# Patient Record
Sex: MALE | Race: BLACK OR AFRICAN AMERICAN | Employment: OTHER | URBAN - METROPOLITAN AREA
[De-identification: names, ages, dates, MRNs, and addresses within clinical notes are randomized per-mention and may not be internally consistent; named-entity substitution may affect disease eponyms.]

---

## 2017-05-25 PROBLEM — N40.0 BENIGN NODULAR PROSTATIC HYPERPLASIA WITHOUT LOWER URINARY TRACT SYMPTOMS: Status: ACTIVE | Noted: 2017-05-25

## 2017-07-12 PROBLEM — E11.42 TYPE 2 DIABETES MELLITUS WITH DIABETIC POLYNEUROPATHY, WITH LONG-TERM CURRENT USE OF INSULIN (HCC): Status: ACTIVE | Noted: 2017-07-12

## 2017-07-12 PROBLEM — Z79.4 TYPE 2 DIABETES MELLITUS WITH DIABETIC POLYNEUROPATHY, WITH LONG-TERM CURRENT USE OF INSULIN (HCC): Status: ACTIVE | Noted: 2017-07-12

## 2017-11-08 ENCOUNTER — HOSPITAL ENCOUNTER (EMERGENCY)
Age: 67
Discharge: HOME | End: 2017-11-08
Attending: EMERGENCY MEDICINE
Payer: MEDICARE

## 2017-11-08 ENCOUNTER — APPOINTMENT (OUTPATIENT)
Dept: GENERAL RADIOLOGY | Age: 67
End: 2017-11-08
Attending: EMERGENCY MEDICINE
Payer: MEDICARE

## 2017-11-08 ENCOUNTER — APPOINTMENT (OUTPATIENT)
Dept: CT IMAGING | Age: 67
End: 2017-11-08
Attending: EMERGENCY MEDICINE
Payer: MEDICARE

## 2017-11-08 VITALS
HEART RATE: 48 BPM | DIASTOLIC BLOOD PRESSURE: 86 MMHG | BODY MASS INDEX: 32.37 KG/M2 | OXYGEN SATURATION: 93 % | RESPIRATION RATE: 18 BRPM | SYSTOLIC BLOOD PRESSURE: 196 MMHG | TEMPERATURE: 99 F | WEIGHT: 239 LBS | HEIGHT: 72 IN

## 2017-11-08 DIAGNOSIS — R53.1 WEAKNESS: Primary | ICD-10-CM

## 2017-11-08 LAB
ALBUMIN SERPL-MCNC: 2.9 G/DL (ref 3.2–4.6)
ALBUMIN/GLOB SERPL: 0.8 {RATIO} (ref 1.2–3.5)
ALP SERPL-CCNC: 68 U/L (ref 50–136)
ALT SERPL-CCNC: 22 U/L (ref 12–65)
ANION GAP SERPL CALC-SCNC: 9 MMOL/L (ref 7–16)
APTT PPP: 24.5 SEC (ref 23.5–31.7)
AST SERPL-CCNC: 30 U/L (ref 15–37)
BASOPHILS # BLD: 0 K/UL (ref 0–0.2)
BASOPHILS NFR BLD: 0 % (ref 0–2)
BILIRUB SERPL-MCNC: 0.2 MG/DL (ref 0.2–1.1)
BUN SERPL-MCNC: 15 MG/DL (ref 8–23)
CALCIUM SERPL-MCNC: 8.7 MG/DL (ref 8.3–10.4)
CHLORIDE SERPL-SCNC: 105 MMOL/L (ref 98–107)
CO2 SERPL-SCNC: 27 MMOL/L (ref 21–32)
CREAT SERPL-MCNC: 1.29 MG/DL (ref 0.8–1.5)
DIFFERENTIAL METHOD BLD: ABNORMAL
EOSINOPHIL # BLD: 0.1 K/UL (ref 0–0.8)
EOSINOPHIL NFR BLD: 1 % (ref 0.5–7.8)
ERYTHROCYTE [DISTWIDTH] IN BLOOD BY AUTOMATED COUNT: 15.6 % (ref 11.9–14.6)
GLOBULIN SER CALC-MCNC: 3.5 G/DL (ref 2.3–3.5)
GLUCOSE SERPL-MCNC: 87 MG/DL (ref 65–100)
HCT VFR BLD AUTO: 35.2 % (ref 41.1–50.3)
HGB BLD-MCNC: 12.3 G/DL (ref 13.6–17.2)
IMM GRANULOCYTES # BLD: 0 K/UL (ref 0–0.5)
IMM GRANULOCYTES NFR BLD: 0 % (ref 0–5)
INR PPP: 1 (ref 0.9–1.2)
LYMPHOCYTES # BLD: 1.7 K/UL (ref 0.5–4.6)
LYMPHOCYTES NFR BLD: 27 % (ref 13–44)
MCH RBC QN AUTO: 31.2 PG (ref 26.1–32.9)
MCHC RBC AUTO-ENTMCNC: 34.9 G/DL (ref 31.4–35)
MCV RBC AUTO: 89.3 FL (ref 79.6–97.8)
MONOCYTES # BLD: 0.3 K/UL (ref 0.1–1.3)
MONOCYTES NFR BLD: 5 % (ref 4–12)
NEUTS SEG # BLD: 4.4 K/UL (ref 1.7–8.2)
NEUTS SEG NFR BLD: 67 % (ref 43–78)
PLATELET # BLD AUTO: 168 K/UL (ref 150–450)
PMV BLD AUTO: 11.2 FL (ref 10.8–14.1)
POTASSIUM SERPL-SCNC: 3.9 MMOL/L (ref 3.5–5.1)
PROT SERPL-MCNC: 6.4 G/DL (ref 6.3–8.2)
PROTHROMBIN TIME: 11.2 SEC (ref 9.6–12)
RBC # BLD AUTO: 3.94 M/UL (ref 4.23–5.67)
SODIUM SERPL-SCNC: 141 MMOL/L (ref 136–145)
TROPONIN I SERPL-MCNC: <0.02 NG/ML (ref 0.02–0.05)
WBC # BLD AUTO: 6.6 K/UL (ref 4.3–11.1)

## 2017-11-08 PROCEDURE — 71020 XR CHEST PA LAT: CPT

## 2017-11-08 PROCEDURE — 70450 CT HEAD/BRAIN W/O DYE: CPT

## 2017-11-08 PROCEDURE — 93005 ELECTROCARDIOGRAM TRACING: CPT | Performed by: EMERGENCY MEDICINE

## 2017-11-08 PROCEDURE — 85730 THROMBOPLASTIN TIME PARTIAL: CPT | Performed by: EMERGENCY MEDICINE

## 2017-11-08 PROCEDURE — 99285 EMERGENCY DEPT VISIT HI MDM: CPT | Performed by: EMERGENCY MEDICINE

## 2017-11-08 PROCEDURE — 80053 COMPREHEN METABOLIC PANEL: CPT | Performed by: EMERGENCY MEDICINE

## 2017-11-08 PROCEDURE — 84484 ASSAY OF TROPONIN QUANT: CPT | Performed by: EMERGENCY MEDICINE

## 2017-11-08 PROCEDURE — 85025 COMPLETE CBC W/AUTO DIFF WBC: CPT | Performed by: EMERGENCY MEDICINE

## 2017-11-08 PROCEDURE — 85610 PROTHROMBIN TIME: CPT | Performed by: EMERGENCY MEDICINE

## 2017-11-09 LAB
ATRIAL RATE: 46 BPM
CALCULATED P AXIS, ECG09: 59 DEGREES
CALCULATED R AXIS, ECG10: 40 DEGREES
CALCULATED T AXIS, ECG11: 80 DEGREES
DIAGNOSIS, 93000: NORMAL
P-R INTERVAL, ECG05: 176 MS
Q-T INTERVAL, ECG07: 500 MS
QRS DURATION, ECG06: 90 MS
QTC CALCULATION (BEZET), ECG08: 437 MS
VENTRICULAR RATE, ECG03: 46 BPM

## 2017-11-09 NOTE — ED TRIAGE NOTES
EMS states \"Patient had a headache on Sunday and Monday that is gone and states he has had weakness since Monday that went away about 2 hours ago\"

## 2017-11-09 NOTE — ED NOTES
I have reviewed discharge instructions with the patient. The patient verbalized understanding. Patient left ED via Discharge Method: wheelchair to Home with his wife and daughter    Opportunity for questions and clarification provided. Patient given 0 scripts.

## 2017-11-09 NOTE — ED TRIAGE NOTES
\"Its kind of like she said. .. I think its better. I was leaning to the left, I was stumbling, my legs were weak.   I feel like Im not stumbling anymore\"
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
## 2017-11-09 NOTE — DISCHARGE INSTRUCTIONS
Weakness: Care Instructions  Your Care Instructions    Weakness is a lack of physical or muscle strength. You may feel that you need to make extra effort to move your arms, legs, or other muscles. Generalized weakness means that you feel weak in most areas of your body. Another type of weakness may affect just one muscle or group of muscles. You may feel weak and tired after you have done too much activity, such as taking an extra-long hike. This is not a serious problem. It often goes away on its own. Feeling weak can also be caused by medical conditions like thyroid problems, depression, or a virus. Sometimes the cause can be serious. Your doctor may want to do more tests to try to find the cause of the weakness. The doctor has checked you carefully, but problems can develop later. If you notice any problems or new symptoms, get medical treatment right away. Follow-up care is a key part of your treatment and safety. Be sure to make and go to all appointments, and call your doctor if you are having problems. It's also a good idea to know your test results and keep a list of the medicines you take. How can you care for yourself at home? · Rest when you feel tired. · Be safe with medicines. If your doctor prescribed medicine, take it exactly as prescribed. Call your doctor if you think you are having a problem with your medicine. You will get more details on the specific medicines your doctor prescribes. · Do not skip meals. Eating a balanced diet may increase your energy level. · Get some physical activity every day, but do not get too tired. When should you call for help? Call your doctor now or seek immediate medical care if:  ? · You have new or worse weakness. ? · You are dizzy or lightheaded, or you feel like you may faint. ? Watch closely for changes in your health, and be sure to contact your doctor if:  ? · You do not get better as expected. Where can you learn more?   Go to http://norris.info/. Enter [id number] in the search box to learn more about \"Weakness: Care Instructions. \"  Current as of: March 20, 2017  Content Version: 11.4  © 2100-9182 Healthwise, Incorporated. Care instructions adapted under license by [employer] (which disclaims liability or warranty for this information). If you have questions about a medical condition or this instruction, always ask your healthcare professional. [address] any warranty or liability for your use of this information.

## 2017-11-12 PROBLEM — Z79.4 TYPE 2 DIABETES MELLITUS WITH DIABETIC POLYNEUROPATHY, WITH LONG-TERM CURRENT USE OF INSULIN (HCC): Status: RESOLVED | Noted: 2017-07-12 | Resolved: 2017-11-12

## 2017-11-12 PROBLEM — E11.42 TYPE 2 DIABETES MELLITUS WITH DIABETIC POLYNEUROPATHY, WITH LONG-TERM CURRENT USE OF INSULIN (HCC): Status: RESOLVED | Noted: 2017-07-12 | Resolved: 2017-11-12

## 2017-11-27 ENCOUNTER — APPOINTMENT (OUTPATIENT)
Dept: GENERAL RADIOLOGY | Age: 67
End: 2017-11-27
Attending: INTERNAL MEDICINE
Payer: MEDICARE

## 2017-11-27 ENCOUNTER — HOSPITAL ENCOUNTER (OUTPATIENT)
Age: 67
Setting detail: OBSERVATION
Discharge: HOME HEALTH | End: 2017-11-29
Attending: EMERGENCY MEDICINE | Admitting: INTERNAL MEDICINE
Payer: MEDICARE

## 2017-11-27 ENCOUNTER — APPOINTMENT (OUTPATIENT)
Dept: GENERAL RADIOLOGY | Age: 67
End: 2017-11-27
Attending: EMERGENCY MEDICINE
Payer: MEDICARE

## 2017-11-27 DIAGNOSIS — L03.116 CELLULITIS OF LEFT LOWER LEG: Primary | ICD-10-CM

## 2017-11-27 DIAGNOSIS — L97.522 DIABETIC ULCER OF LEFT FOOT ASSOCIATED WITH TYPE 1 DIABETES MELLITUS, WITH FAT LAYER EXPOSED, UNSPECIFIED PART OF FOOT (HCC): ICD-10-CM

## 2017-11-27 DIAGNOSIS — E10.621 DIABETIC ULCER OF LEFT FOOT ASSOCIATED WITH TYPE 1 DIABETES MELLITUS, WITH FAT LAYER EXPOSED, UNSPECIFIED PART OF FOOT (HCC): ICD-10-CM

## 2017-11-27 PROBLEM — S81.802A LEG WOUND, LEFT: Status: ACTIVE | Noted: 2017-11-27

## 2017-11-27 LAB
ALBUMIN SERPL-MCNC: 2.9 G/DL (ref 3.2–4.6)
ALBUMIN/GLOB SERPL: 0.6 {RATIO} (ref 1.2–3.5)
ALP SERPL-CCNC: 90 U/L (ref 50–136)
ALT SERPL-CCNC: 18 U/L (ref 12–65)
ANION GAP SERPL CALC-SCNC: 8 MMOL/L (ref 7–16)
AST SERPL-CCNC: 22 U/L (ref 15–37)
BASOPHILS # BLD: 0 K/UL (ref 0–0.2)
BASOPHILS NFR BLD: 0 % (ref 0–2)
BILIRUB SERPL-MCNC: 0.4 MG/DL (ref 0.2–1.1)
BUN SERPL-MCNC: 14 MG/DL (ref 8–23)
CALCIUM SERPL-MCNC: 8.8 MG/DL (ref 8.3–10.4)
CHLORIDE SERPL-SCNC: 104 MMOL/L (ref 98–107)
CO2 SERPL-SCNC: 29 MMOL/L (ref 21–32)
CREAT SERPL-MCNC: 1.08 MG/DL (ref 0.8–1.5)
CRP SERPL-MCNC: 1.3 MG/DL (ref 0–0.9)
DIFFERENTIAL METHOD BLD: ABNORMAL
EOSINOPHIL # BLD: 0.1 K/UL (ref 0–0.8)
EOSINOPHIL NFR BLD: 1 % (ref 0.5–7.8)
ERYTHROCYTE [DISTWIDTH] IN BLOOD BY AUTOMATED COUNT: 15.9 % (ref 11.9–14.6)
GLOBULIN SER CALC-MCNC: 4.7 G/DL (ref 2.3–3.5)
GLUCOSE SERPL-MCNC: 113 MG/DL (ref 65–100)
HCT VFR BLD AUTO: 37.7 % (ref 41.1–50.3)
HGB BLD-MCNC: 12.7 G/DL (ref 13.6–17.2)
IMM GRANULOCYTES # BLD: 0 K/UL (ref 0–0.5)
IMM GRANULOCYTES NFR BLD AUTO: 0 % (ref 0–5)
LACTATE BLD-SCNC: 1.1 MMOL/L (ref 0.5–1.9)
LYMPHOCYTES # BLD: 2 K/UL (ref 0.5–4.6)
LYMPHOCYTES NFR BLD: 29 % (ref 13–44)
MCH RBC QN AUTO: 30.6 PG (ref 26.1–32.9)
MCHC RBC AUTO-ENTMCNC: 33.7 G/DL (ref 31.4–35)
MCV RBC AUTO: 90.8 FL (ref 79.6–97.8)
MONOCYTES # BLD: 0.6 K/UL (ref 0.1–1.3)
MONOCYTES NFR BLD: 9 % (ref 4–12)
NEUTS SEG # BLD: 4.2 K/UL (ref 1.7–8.2)
NEUTS SEG NFR BLD: 61 % (ref 43–78)
PLATELET # BLD AUTO: 206 K/UL (ref 150–450)
PMV BLD AUTO: 11.6 FL (ref 10.8–14.1)
POTASSIUM SERPL-SCNC: 3.7 MMOL/L (ref 3.5–5.1)
PROCALCITONIN SERPL-MCNC: <0.1 NG/ML
PROT SERPL-MCNC: 7.6 G/DL (ref 6.3–8.2)
RBC # BLD AUTO: 4.15 M/UL (ref 4.23–5.67)
SODIUM SERPL-SCNC: 141 MMOL/L (ref 136–145)
WBC # BLD AUTO: 6.9 K/UL (ref 4.3–11.1)

## 2017-11-27 PROCEDURE — 99283 EMERGENCY DEPT VISIT LOW MDM: CPT | Performed by: EMERGENCY MEDICINE

## 2017-11-27 PROCEDURE — 96375 TX/PRO/DX INJ NEW DRUG ADDON: CPT | Performed by: EMERGENCY MEDICINE

## 2017-11-27 PROCEDURE — 96365 THER/PROPH/DIAG IV INF INIT: CPT | Performed by: EMERGENCY MEDICINE

## 2017-11-27 PROCEDURE — 86140 C-REACTIVE PROTEIN: CPT | Performed by: EMERGENCY MEDICINE

## 2017-11-27 PROCEDURE — 87070 CULTURE OTHR SPECIMN AEROBIC: CPT | Performed by: EMERGENCY MEDICINE

## 2017-11-27 PROCEDURE — 85025 COMPLETE CBC W/AUTO DIFF WBC: CPT | Performed by: EMERGENCY MEDICINE

## 2017-11-27 PROCEDURE — 73590 X-RAY EXAM OF LOWER LEG: CPT

## 2017-11-27 PROCEDURE — 96367 TX/PROPH/DG ADDL SEQ IV INF: CPT | Performed by: EMERGENCY MEDICINE

## 2017-11-27 PROCEDURE — 74011250636 HC RX REV CODE- 250/636: Performed by: EMERGENCY MEDICINE

## 2017-11-27 PROCEDURE — 74011000258 HC RX REV CODE- 258: Performed by: EMERGENCY MEDICINE

## 2017-11-27 PROCEDURE — 80053 COMPREHEN METABOLIC PANEL: CPT | Performed by: EMERGENCY MEDICINE

## 2017-11-27 PROCEDURE — 87077 CULTURE AEROBIC IDENTIFY: CPT | Performed by: EMERGENCY MEDICINE

## 2017-11-27 PROCEDURE — 84145 PROCALCITONIN (PCT): CPT | Performed by: EMERGENCY MEDICINE

## 2017-11-27 PROCEDURE — 73610 X-RAY EXAM OF ANKLE: CPT

## 2017-11-27 PROCEDURE — 87040 BLOOD CULTURE FOR BACTERIA: CPT | Performed by: INTERNAL MEDICINE

## 2017-11-27 PROCEDURE — 83605 ASSAY OF LACTIC ACID: CPT

## 2017-11-27 PROCEDURE — 87186 SC STD MICRODIL/AGAR DIL: CPT | Performed by: EMERGENCY MEDICINE

## 2017-11-27 PROCEDURE — 93005 ELECTROCARDIOGRAM TRACING: CPT | Performed by: INTERNAL MEDICINE

## 2017-11-27 RX ORDER — VANCOMYCIN 2 GRAM/500 ML IN 0.9 % SODIUM CHLORIDE INTRAVENOUS
2000 ONCE
Status: COMPLETED | OUTPATIENT
Start: 2017-11-27 | End: 2017-11-28

## 2017-11-27 RX ORDER — VANCOMYCIN HYDROCHLORIDE 1 G/20ML
INJECTION, POWDER, LYOPHILIZED, FOR SOLUTION INTRAVENOUS ONCE
Status: DISCONTINUED | OUTPATIENT
Start: 2017-11-27 | End: 2017-11-27 | Stop reason: DRUGHIGH

## 2017-11-27 RX ADMIN — VANCOMYCIN HYDROCHLORIDE 2000 MG: 10 INJECTION, POWDER, LYOPHILIZED, FOR SOLUTION INTRAVENOUS at 23:27

## 2017-11-27 RX ADMIN — PIPERACILLIN SODIUM,TAZOBACTAM SODIUM 4.5 G: 4; .5 INJECTION, POWDER, FOR SOLUTION INTRAVENOUS at 21:50

## 2017-11-27 NOTE — IP AVS SNAPSHOT
Kamini Galvan 
 
 
 [address] [facility] [address] [facility] 
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  
Where:  [facility] with [facility] at  1:45 PM  
Where:  [facility] ([address] [place] [address]) Go to Ellis Granda MD  
at 1 :45 wound care clinic Phone:  [phone] Where:  [address], [place] [address] [address] Tuesday Jan 16, 2018 Follow Up with Colton Raygoza MD at  1:15 PM  
Where:  [facility] ([address] [facility]) Discharge Orders None A check skylar indicates which time of day the medication should be taken. My Medications TAKE these medications as instructed Instructions Each Dose to Equal  
 Morning Noon Evening Bedtime  
 aspirin delayed-release 81 mg tablet Your next dose is:  11-30 Take 1 Tab by mouth daily. 81 mg  
    
   
   
   
  
 atorvastatin 80 mg tablet Commonly known as:  LIPITOR Your next dose is:  11-30 Take 1 Tab by mouth daily. 80 mg  
    
   
   
   
  
 cloNIDine HCl 0.2 mg tablet Commonly known as:  CATAPRES Your next dose is: This evening Take 0.2 mg by mouth two (2) times a day. 0.2 mg  
    
   
   
  
   
  
 clopidogrel 75 mg Tab Commonly known as:  PLAVIX Your next dose is:  11-30 Take 1 Tab by mouth daily (after breakfast). 75 mg  
    
   
   
   
  
 furosemide 20 mg tablet Commonly known as:  LASIX Your next dose is:  11-30 Take 1 Tab by mouth daily. Take  by mouth daily. 20 mg  
    
   
   
   
  
 gabapentin 600 mg tablet Commonly known as:  NEURONTIN Your next dose is: This evening Take 1 Tab by mouth three (3) times daily. 600 mg  
    
   
   
  
   
  
 hydrALAZINE 50 mg tablet Commonly known as:  APRESOLINE Your next dose is: This evening Take 0.5 Tabs by mouth three (3) times daily. 25 mg  
    
   
   
  
   
  
 isosorbide mononitrate ER 30 mg tablet Commonly known as:  IMDUR Your next dose is:  11-30 Take  by mouth daily. levothyroxine 25 mcg tablet Commonly known as:  SYNTHROID Your next dose is:  11-30 Take  by mouth Daily (before breakfast). metoprolol tartrate 50 mg tablet Commonly known as:  LOPRESSOR Your next dose is: This evening Take 25 mg by mouth two (2) times a day. 25 mg  
    
   
   
  
   
  
 morphine IR 15 mg tablet Commonly known as:  MS IR Your next dose is:  As needed Take  by mouth every four (4) hours as needed. NITROSTAT 0.4 mg SL tablet Generic drug:  nitroglycerin Your next dose is:  As needed  
   
 by SubLINGual route every five (5) minutes as needed. NORVASC 10 mg tablet Generic drug:  amLODIPine Your next dose is:  11-30 Take  by mouth daily. NovoLIN N 100 unit/mL injection Generic drug:  insulin NPH Your next dose is:  bedtime 40 Units by SubCUTAneous route once. 40 UNITS QAM, 32 UNITS QHS  Indications: type 2 diabetes mellitus 40 Units  
    
   
   
   
  
  
 oxybutynin 5 mg tablet Commonly known as:  KEODWVIP Your next dose is:  bedtime Take 5 mg by mouth nightly. 5 mg  
    
   
   
   
  
  
 pioglitazone 30 mg tablet Commonly known as:  ACTOS Your next dose is:  bedtime Take 1 Tab by mouth nightly. 30 mg  
    
   
   
   
  
  
 potassium chloride 20 mEq tablet Commonly known as:  K-DUR, KLOR-CON Your next dose is:  11-30 Take 1 Tab by mouth daily. 20 mEq  
    
   
   
   
  
 prazosin 1 mg capsule Commonly known as:  MINIPRESS Your next dose is:  bedtime Take 1 Cap by mouth nightly. 1 mg RAZADYNE 8 mg tablet Generic drug:  galantamine Your next dose is: This evening Take 8 mg by mouth two (2) times a day. 8 mg  
    
   
   
  
   
  
 risperiDONE 2 mg tablet Commonly known as:  RisperDAL Your next dose is:  11-30 Take 1 mg by mouth daily. 1 mg  
    
   
   
   
  
 valsartan 160 mg tablet Commonly known as:  DIOVAN Your next dose is:  11-30 Take 1 Tab by mouth daily. 160 mg  
    
   
   
   
  
 VITAMIN B-12 1,000 mcg tablet Generic drug:  cyanocobalamin Your next dose is:  11-30 Take 1,000 mcg by mouth daily. 1000 mcg Discharge Instructions DISCHARGE SUMMARY from Nurse PATIENT INSTRUCTIONS: 
 
 
F-face looks uneven A-arms unable to move or move unevenly S-speech slurred or non-existent T-time-call 911 as soon as signs and symptoms begin-DO NOT go Back to bed or wait to see if you get better-TIME IS BRAIN. Warning Signs of HEART ATTACK Call 911 if you have these symptoms: 
? Chest discomfort. Most heart attacks involve discomfort in the center of the chest that lasts more than a few minutes, or that goes away and comes back. It can feel like uncomfortable pressure, squeezing, fullness, or pain. ? Discomfort in other areas of the upper body. Symptoms can include pain or discomfort in one or both arms, the back, neck, jaw, or stomach. ? Shortness of breath with or without chest discomfort. ? Other signs may include breaking out in a cold sweat, nausea, or lightheadedness. Don't wait more than five minutes to call [address]! Fast action can save your life. Calling 911 is almost always the fastest way to get lifesaving treatment. Emergency Medical Services staff can begin treatment when they arrive  up to an hour sooner than if someone gets to the hospital by car. The discharge information has been reviewed with the patient. The patient verbalized understanding. Discharge medications reviewed with the patient and appropriate educational materials and side effects teaching were provided. ___________________________________________________________________________________________________________________________________ [facility] Announcement We are excited to announce that we are making your provider's discharge notes available to you in Relatientt. You will see these notes when they are completed and signed by the physician that discharged you from your recent hospital stay.   If you have any questions or concerns about any information you see in Relatientt, please call the Health Information Department where you were seen or reach out to your Primary Care Provider for more information about your plan of care. Introducing [facility]! Tamy Butts introduces WhistleTalk patient portal. Now you can access parts of your medical record, email your doctor's office, and request medication refills online. 1. In your internet browser, go to [email or web address] 2. Click on the First Time User? Click Here link in the Sign In box. You will see the New Member Sign Up page. 3. Enter your WhistleTalk Access Code exactly as it appears below. You will not need to use this code after youve completed the sign-up process. If you do not sign up before the expiration date, you must request a new code. · WhistleTalk Access Code: [email or web address] Expires: 12/25/2017  7:17 AM 
 
4. Enter the last four digits of your Social Security Number (xxxx) and Date of Birth (mm/dd/yyyy) as indicated and click Submit. You will be taken to the next sign-up page. 5. Create a WhistleTalk ID. This will be your WhistleTalk login ID and cannot be changed, so think of one that is secure and easy to remember. 6. Create a WhistleTalk password. You can change your password at any time. 7. Enter your Password Reset Question and Answer. This can be used at a later time if you forget your password. 8. Enter your e-mail address. You will receive e-mail notification when new information is available in [address] 9. Click Sign Up. You can now view and download portions of your medical record. 10. Click the Download Summary menu link to download a portable copy of your medical information. If you have questions, please visit the Frequently Asked Questions section of the WhistleTalk website. Remember, WhistleTalk is NOT to be used for urgent needs. For medical emergencies, dial 911. Now available from your iPhone and Android! Unresulted Labs-Please follow up with your PCP about these lab tests Order Current Status CULTURE, BLOOD Preliminary result CULTURE, BLOOD Preliminary result CULTURE, WOUND W GRAM STAIN Preliminary result Providers Seen During Your Hospitalization Provider Specialty Primary office phone Kelechi Erazo MD Emergency Medicine [phone] Claudette Promise, MD Internal Medicine [phone] Immunizations Administered for This Admission Name Date  
 TB Skin Test (PPD) Intradermal 11/28/2017 Your Primary Care Physician (PCP) Primary Care Physician Office Phone Office Fax [address] [phone] You are allergic to the following Allergen Reactions Fosinopril Hives Lisinopril Unknown (comments) Recent Documentation Height Weight BMI Smoking Status 1.854 m 117.3 kg 34.12 kg/m2 Current Some Day Smoker Emergency Contacts Name Discharge Info Relation Home Work Mobile Maryse Al  Spouse [3] [phone] Patient Belongings The following personal items are in your possession at time of discharge: 
  Dental Appliances: Uppers, Lowers, At bedside, With patient  Visual Aid: Glasses      Home Medications: None   Jewelry: Ring, With patient  Clothing: At bedside, Pants, Shirt, Slippers, Sweater, With patient    Other Valuables: Cane, With patient, At bedside Please provide this summary of care documentation to your next provider. Signatures-by signing, you are acknowledging that this After Visit Summary has been reviewed with you and you have received a copy. Patient Signature:  ____________________________________________________________ Date:  ____________________________________________________________  
  
[employer] Provider Signature:  ____________________________________________________________ Date:  ____________________________________________________________

## 2017-11-28 ENCOUNTER — APPOINTMENT (OUTPATIENT)
Dept: ULTRASOUND IMAGING | Age: 67
End: 2017-11-28
Attending: INTERNAL MEDICINE
Payer: MEDICARE

## 2017-11-28 ENCOUNTER — APPOINTMENT (OUTPATIENT)
Dept: GENERAL RADIOLOGY | Age: 67
End: 2017-11-28
Attending: INTERNAL MEDICINE
Payer: MEDICARE

## 2017-11-28 PROBLEM — N40.0 BENIGN NODULAR PROSTATIC HYPERPLASIA WITHOUT LOWER URINARY TRACT SYMPTOMS: Chronic | Status: ACTIVE | Noted: 2017-05-25

## 2017-11-28 LAB
ALBUMIN SERPL-MCNC: 2.7 G/DL (ref 3.2–4.6)
ALBUMIN/GLOB SERPL: 0.6 {RATIO} (ref 1.2–3.5)
ALP SERPL-CCNC: 86 U/L (ref 50–136)
ALT SERPL-CCNC: 16 U/L (ref 12–65)
ANION GAP SERPL CALC-SCNC: 10 MMOL/L (ref 7–16)
AST SERPL-CCNC: 17 U/L (ref 15–37)
ATRIAL RATE: 82 BPM
BILIRUB SERPL-MCNC: 0.6 MG/DL (ref 0.2–1.1)
BUN SERPL-MCNC: 11 MG/DL (ref 8–23)
CALCIUM SERPL-MCNC: 8.6 MG/DL (ref 8.3–10.4)
CALCULATED P AXIS, ECG09: 73 DEGREES
CALCULATED R AXIS, ECG10: 16 DEGREES
CALCULATED T AXIS, ECG11: 69 DEGREES
CHLORIDE SERPL-SCNC: 105 MMOL/L (ref 98–107)
CHOLEST SERPL-MCNC: 114 MG/DL
CO2 SERPL-SCNC: 26 MMOL/L (ref 21–32)
CREAT SERPL-MCNC: 0.86 MG/DL (ref 0.8–1.5)
DIAGNOSIS, 93000: NORMAL
ERYTHROCYTE [DISTWIDTH] IN BLOOD BY AUTOMATED COUNT: 15.9 % (ref 11.9–14.6)
EST. AVERAGE GLUCOSE BLD GHB EST-MCNC: 148 MG/DL
GLOBULIN SER CALC-MCNC: 4.3 G/DL (ref 2.3–3.5)
GLUCOSE BLD STRIP.AUTO-MCNC: 112 MG/DL (ref 65–100)
GLUCOSE BLD STRIP.AUTO-MCNC: 151 MG/DL (ref 65–100)
GLUCOSE BLD STRIP.AUTO-MCNC: 174 MG/DL (ref 65–100)
GLUCOSE SERPL-MCNC: 155 MG/DL (ref 65–100)
HBA1C MFR BLD: 6.8 % (ref 4.8–6)
HCT VFR BLD AUTO: 36 % (ref 41.1–50.3)
HDLC SERPL-MCNC: 52 MG/DL (ref 40–60)
HDLC SERPL: 2.2 {RATIO}
HGB BLD-MCNC: 12.2 G/DL (ref 13.6–17.2)
LACTATE SERPL-SCNC: 1.3 MMOL/L (ref 0.4–2)
LDLC SERPL CALC-MCNC: 50 MG/DL
LIPID PROFILE,FLP: NORMAL
MAGNESIUM SERPL-MCNC: 1.7 MG/DL (ref 1.8–2.4)
MCH RBC QN AUTO: 30.7 PG (ref 26.1–32.9)
MCHC RBC AUTO-ENTMCNC: 33.9 G/DL (ref 31.4–35)
MCV RBC AUTO: 90.5 FL (ref 79.6–97.8)
P-R INTERVAL, ECG05: 148 MS
PLATELET # BLD AUTO: 206 K/UL (ref 150–450)
PMV BLD AUTO: 12.1 FL (ref 10.8–14.1)
POTASSIUM SERPL-SCNC: 3.5 MMOL/L (ref 3.5–5.1)
PROT SERPL-MCNC: 7 G/DL (ref 6.3–8.2)
Q-T INTERVAL, ECG07: 392 MS
QRS DURATION, ECG06: 88 MS
QTC CALCULATION (BEZET), ECG08: 457 MS
RBC # BLD AUTO: 3.98 M/UL (ref 4.23–5.67)
SODIUM SERPL-SCNC: 141 MMOL/L (ref 136–145)
TRIGL SERPL-MCNC: 60 MG/DL (ref 35–150)
TSH SERPL DL<=0.005 MIU/L-ACNC: 2.28 UIU/ML (ref 0.36–3.74)
TSH SERPL DL<=0.005 MIU/L-ACNC: 2.32 UIU/ML (ref 0.36–3.74)
VENTRICULAR RATE, ECG03: 82 BPM
VLDLC SERPL CALC-MCNC: 12 MG/DL (ref 6–23)
WBC # BLD AUTO: 6 K/UL (ref 4.3–11.1)

## 2017-11-28 PROCEDURE — 84443 ASSAY THYROID STIM HORMONE: CPT | Performed by: INTERNAL MEDICINE

## 2017-11-28 PROCEDURE — 71010 XR CHEST SNGL V: CPT

## 2017-11-28 PROCEDURE — 96366 THER/PROPH/DIAG IV INF ADDON: CPT

## 2017-11-28 PROCEDURE — 99218 HC RM OBSERVATION: CPT

## 2017-11-28 PROCEDURE — 74011000302 HC RX REV CODE- 302: Performed by: INTERNAL MEDICINE

## 2017-11-28 PROCEDURE — 80053 COMPREHEN METABOLIC PANEL: CPT | Performed by: INTERNAL MEDICINE

## 2017-11-28 PROCEDURE — 82962 GLUCOSE BLOOD TEST: CPT

## 2017-11-28 PROCEDURE — 83605 ASSAY OF LACTIC ACID: CPT | Performed by: INTERNAL MEDICINE

## 2017-11-28 PROCEDURE — 85027 COMPLETE CBC AUTOMATED: CPT | Performed by: INTERNAL MEDICINE

## 2017-11-28 PROCEDURE — 80061 LIPID PANEL: CPT | Performed by: INTERNAL MEDICINE

## 2017-11-28 PROCEDURE — 86580 TB INTRADERMAL TEST: CPT | Performed by: INTERNAL MEDICINE

## 2017-11-28 PROCEDURE — 74011250637 HC RX REV CODE- 250/637: Performed by: INTERNAL MEDICINE

## 2017-11-28 PROCEDURE — 74011250636 HC RX REV CODE- 250/636: Performed by: INTERNAL MEDICINE

## 2017-11-28 PROCEDURE — 36415 COLL VENOUS BLD VENIPUNCTURE: CPT | Performed by: INTERNAL MEDICINE

## 2017-11-28 PROCEDURE — G8980 MOBILITY D/C STATUS: HCPCS

## 2017-11-28 PROCEDURE — 96366 THER/PROPH/DIAG IV INF ADDON: CPT | Performed by: EMERGENCY MEDICINE

## 2017-11-28 PROCEDURE — 97162 PT EVAL MOD COMPLEX 30 MIN: CPT

## 2017-11-28 PROCEDURE — 96372 THER/PROPH/DIAG INJ SC/IM: CPT

## 2017-11-28 PROCEDURE — 83735 ASSAY OF MAGNESIUM: CPT | Performed by: INTERNAL MEDICINE

## 2017-11-28 PROCEDURE — 93922 UPR/L XTREMITY ART 2 LEVELS: CPT

## 2017-11-28 PROCEDURE — 83036 HEMOGLOBIN GLYCOSYLATED A1C: CPT | Performed by: INTERNAL MEDICINE

## 2017-11-28 PROCEDURE — 74011000258 HC RX REV CODE- 258: Performed by: INTERNAL MEDICINE

## 2017-11-28 PROCEDURE — 93971 EXTREMITY STUDY: CPT

## 2017-11-28 PROCEDURE — 74011636637 HC RX REV CODE- 636/637: Performed by: INTERNAL MEDICINE

## 2017-11-28 PROCEDURE — G8979 MOBILITY GOAL STATUS: HCPCS

## 2017-11-28 PROCEDURE — G8978 MOBILITY CURRENT STATUS: HCPCS

## 2017-11-28 RX ORDER — ISOSORBIDE MONONITRATE 30 MG/1
30 TABLET, EXTENDED RELEASE ORAL DAILY
Status: DISCONTINUED | OUTPATIENT
Start: 2017-11-28 | End: 2017-11-29 | Stop reason: HOSPADM

## 2017-11-28 RX ORDER — HEPARIN SODIUM 5000 [USP'U]/ML
5000 INJECTION, SOLUTION INTRAVENOUS; SUBCUTANEOUS EVERY 12 HOURS
Status: DISCONTINUED | OUTPATIENT
Start: 2017-11-28 | End: 2017-11-28 | Stop reason: SDUPTHER

## 2017-11-28 RX ORDER — LEVOTHYROXINE SODIUM 50 UG/1
25 TABLET ORAL
Status: DISCONTINUED | OUTPATIENT
Start: 2017-11-28 | End: 2017-11-29 | Stop reason: HOSPADM

## 2017-11-28 RX ORDER — ATORVASTATIN CALCIUM 40 MG/1
80 TABLET, FILM COATED ORAL
Status: DISCONTINUED | OUTPATIENT
Start: 2017-11-28 | End: 2017-11-29 | Stop reason: HOSPADM

## 2017-11-28 RX ORDER — AMLODIPINE BESYLATE 10 MG/1
10 TABLET ORAL DAILY
Status: DISCONTINUED | OUTPATIENT
Start: 2017-11-28 | End: 2017-11-29 | Stop reason: HOSPADM

## 2017-11-28 RX ORDER — HEPARIN SODIUM 5000 [USP'U]/ML
5000 INJECTION, SOLUTION INTRAVENOUS; SUBCUTANEOUS EVERY 12 HOURS
Status: DISCONTINUED | OUTPATIENT
Start: 2017-11-28 | End: 2017-11-28

## 2017-11-28 RX ORDER — HEPARIN SODIUM 5000 [USP'U]/ML
5000 INJECTION, SOLUTION INTRAVENOUS; SUBCUTANEOUS EVERY 8 HOURS
Status: DISCONTINUED | OUTPATIENT
Start: 2017-11-28 | End: 2017-11-29 | Stop reason: HOSPADM

## 2017-11-28 RX ORDER — HYDRALAZINE HYDROCHLORIDE 25 MG/1
25 TABLET, FILM COATED ORAL 3 TIMES DAILY
Status: DISCONTINUED | OUTPATIENT
Start: 2017-11-28 | End: 2017-11-29 | Stop reason: HOSPADM

## 2017-11-28 RX ORDER — IPRATROPIUM BROMIDE AND ALBUTEROL SULFATE 2.5; .5 MG/3ML; MG/3ML
3 SOLUTION RESPIRATORY (INHALATION)
Status: DISCONTINUED | OUTPATIENT
Start: 2017-11-28 | End: 2017-11-29 | Stop reason: HOSPADM

## 2017-11-28 RX ORDER — PRAZOSIN HYDROCHLORIDE 1 MG/1
1 CAPSULE ORAL
Status: DISCONTINUED | OUTPATIENT
Start: 2017-11-28 | End: 2017-11-29 | Stop reason: HOSPADM

## 2017-11-28 RX ORDER — VANCOMYCIN HYDROCHLORIDE
1250 EVERY 12 HOURS
Status: DISCONTINUED | OUTPATIENT
Start: 2017-11-28 | End: 2017-11-28

## 2017-11-28 RX ORDER — INSULIN LISPRO 100 [IU]/ML
INJECTION, SOLUTION INTRAVENOUS; SUBCUTANEOUS
Status: DISCONTINUED | OUTPATIENT
Start: 2017-11-28 | End: 2017-11-29 | Stop reason: HOSPADM

## 2017-11-28 RX ORDER — NITROGLYCERIN 0.4 MG/1
0.4 TABLET SUBLINGUAL
Status: DISCONTINUED | OUTPATIENT
Start: 2017-11-28 | End: 2017-11-29 | Stop reason: HOSPADM

## 2017-11-28 RX ORDER — CLONIDINE HYDROCHLORIDE 0.2 MG/1
0.2 TABLET ORAL 2 TIMES DAILY
Status: DISCONTINUED | OUTPATIENT
Start: 2017-11-28 | End: 2017-11-29 | Stop reason: HOSPADM

## 2017-11-28 RX ORDER — RISPERIDONE 1 MG/1
1 TABLET, FILM COATED ORAL
Status: DISCONTINUED | OUTPATIENT
Start: 2017-11-28 | End: 2017-11-29 | Stop reason: HOSPADM

## 2017-11-28 RX ORDER — PIOGLITAZONEHYDROCHLORIDE 30 MG/1
30 TABLET ORAL
Status: DISCONTINUED | OUTPATIENT
Start: 2017-11-28 | End: 2017-11-29 | Stop reason: HOSPADM

## 2017-11-28 RX ORDER — FUROSEMIDE 20 MG/1
20 TABLET ORAL DAILY
Status: DISCONTINUED | OUTPATIENT
Start: 2017-11-28 | End: 2017-11-29 | Stop reason: HOSPADM

## 2017-11-28 RX ORDER — MORPHINE SULFATE 15 MG/1
15 TABLET ORAL
Status: DISCONTINUED | OUTPATIENT
Start: 2017-11-28 | End: 2017-11-29 | Stop reason: HOSPADM

## 2017-11-28 RX ORDER — GABAPENTIN 300 MG/1
300 CAPSULE ORAL 3 TIMES DAILY
Status: DISCONTINUED | OUTPATIENT
Start: 2017-11-28 | End: 2017-11-29 | Stop reason: HOSPADM

## 2017-11-28 RX ORDER — CLOPIDOGREL BISULFATE 75 MG/1
75 TABLET ORAL
Status: DISCONTINUED | OUTPATIENT
Start: 2017-11-28 | End: 2017-11-29 | Stop reason: HOSPADM

## 2017-11-28 RX ORDER — HYDRALAZINE HYDROCHLORIDE 20 MG/ML
20 INJECTION INTRAMUSCULAR; INTRAVENOUS
Status: DISCONTINUED | OUTPATIENT
Start: 2017-11-28 | End: 2017-11-29 | Stop reason: HOSPADM

## 2017-11-28 RX ORDER — VALSARTAN 160 MG/1
160 TABLET ORAL DAILY
Status: DISCONTINUED | OUTPATIENT
Start: 2017-11-28 | End: 2017-11-29 | Stop reason: HOSPADM

## 2017-11-28 RX ORDER — METOPROLOL TARTRATE 25 MG/1
25 TABLET, FILM COATED ORAL 2 TIMES DAILY
Status: DISCONTINUED | OUTPATIENT
Start: 2017-11-28 | End: 2017-11-29 | Stop reason: HOSPADM

## 2017-11-28 RX ORDER — HYDRALAZINE HYDROCHLORIDE 20 MG/ML
10 INJECTION INTRAMUSCULAR; INTRAVENOUS
Status: DISCONTINUED | OUTPATIENT
Start: 2017-11-28 | End: 2017-11-28

## 2017-11-28 RX ORDER — OXYBUTYNIN CHLORIDE 5 MG/1
5 TABLET ORAL
Status: DISCONTINUED | OUTPATIENT
Start: 2017-11-28 | End: 2017-11-29 | Stop reason: HOSPADM

## 2017-11-28 RX ORDER — GALANTAMINE 4 MG/1
8 TABLET, FILM COATED ORAL 2 TIMES DAILY
Status: DISCONTINUED | OUTPATIENT
Start: 2017-11-28 | End: 2017-11-29 | Stop reason: HOSPADM

## 2017-11-28 RX ORDER — ASPIRIN 81 MG/1
81 TABLET ORAL DAILY
Status: DISCONTINUED | OUTPATIENT
Start: 2017-11-28 | End: 2017-11-29 | Stop reason: HOSPADM

## 2017-11-28 RX ADMIN — OXYBUTYNIN CHLORIDE 5 MG: 5 TABLET ORAL at 03:44

## 2017-11-28 RX ADMIN — ATORVASTATIN CALCIUM 80 MG: 40 TABLET, FILM COATED ORAL at 21:14

## 2017-11-28 RX ADMIN — OXYBUTYNIN CHLORIDE 5 MG: 5 TABLET ORAL at 21:15

## 2017-11-28 RX ADMIN — CLONIDINE HYDROCHLORIDE 0.2 MG: 0.2 TABLET ORAL at 17:36

## 2017-11-28 RX ADMIN — INSULIN LISPRO 2 UNITS: 100 INJECTION, SOLUTION INTRAVENOUS; SUBCUTANEOUS at 11:30

## 2017-11-28 RX ADMIN — HYDRALAZINE HYDROCHLORIDE 25 MG: 25 TABLET, FILM COATED ORAL at 17:36

## 2017-11-28 RX ADMIN — RISPERIDONE 1 MG: 1 TABLET ORAL at 03:44

## 2017-11-28 RX ADMIN — GALANTAMINE 8 MG: 4 TABLET, FILM COATED ORAL at 17:36

## 2017-11-28 RX ADMIN — INSULIN LISPRO 2 UNITS: 100 INJECTION, SOLUTION INTRAVENOUS; SUBCUTANEOUS at 08:07

## 2017-11-28 RX ADMIN — CLOPIDOGREL BISULFATE 75 MG: 75 TABLET ORAL at 08:54

## 2017-11-28 RX ADMIN — PIOGLITAZONE HYDROCHLORIDE 30 MG: 15 TABLET ORAL at 03:45

## 2017-11-28 RX ADMIN — HYDRALAZINE HYDROCHLORIDE 25 MG: 25 TABLET, FILM COATED ORAL at 21:15

## 2017-11-28 RX ADMIN — MORPHINE SULFATE 15 MG: 15 TABLET ORAL at 09:57

## 2017-11-28 RX ADMIN — HEPARIN SODIUM 5000 UNITS: 5000 INJECTION, SOLUTION INTRAVENOUS; SUBCUTANEOUS at 09:49

## 2017-11-28 RX ADMIN — FUROSEMIDE 20 MG: 20 TABLET ORAL at 08:53

## 2017-11-28 RX ADMIN — PIPERACILLIN SODIUM,TAZOBACTAM SODIUM 3.38 G: 3; .375 INJECTION, POWDER, FOR SOLUTION INTRAVENOUS at 04:37

## 2017-11-28 RX ADMIN — MORPHINE SULFATE 15 MG: 15 TABLET ORAL at 23:30

## 2017-11-28 RX ADMIN — GABAPENTIN 300 MG: 300 CAPSULE ORAL at 08:53

## 2017-11-28 RX ADMIN — INSULIN HUMAN 20 UNITS: 100 INJECTION, SUSPENSION SUBCUTANEOUS at 08:07

## 2017-11-28 RX ADMIN — LEVOTHYROXINE SODIUM 25 MCG: 50 TABLET ORAL at 08:06

## 2017-11-28 RX ADMIN — METOPROLOL TARTRATE 25 MG: 25 TABLET ORAL at 17:36

## 2017-11-28 RX ADMIN — INSULIN LISPRO 2 UNITS: 100 INJECTION, SOLUTION INTRAVENOUS; SUBCUTANEOUS at 22:00

## 2017-11-28 RX ADMIN — RISPERIDONE 1 MG: 1 TABLET ORAL at 21:15

## 2017-11-28 RX ADMIN — GABAPENTIN 300 MG: 300 CAPSULE ORAL at 17:36

## 2017-11-28 RX ADMIN — METOPROLOL TARTRATE 25 MG: 25 TABLET ORAL at 09:56

## 2017-11-28 RX ADMIN — ATORVASTATIN CALCIUM 80 MG: 40 TABLET, FILM COATED ORAL at 03:44

## 2017-11-28 RX ADMIN — PRAZOSIN HYDROCHLORIDE 1 MG: 1 CAPSULE ORAL at 21:15

## 2017-11-28 RX ADMIN — MORPHINE SULFATE 15 MG: 15 TABLET ORAL at 17:36

## 2017-11-28 RX ADMIN — HEPARIN SODIUM 5000 UNITS: 5000 INJECTION, SOLUTION INTRAVENOUS; SUBCUTANEOUS at 17:37

## 2017-11-28 RX ADMIN — GABAPENTIN 300 MG: 300 CAPSULE ORAL at 21:15

## 2017-11-28 RX ADMIN — ISOSORBIDE MONONITRATE 30 MG: 30 TABLET, EXTENDED RELEASE ORAL at 08:53

## 2017-11-28 RX ADMIN — VALSARTAN 160 MG: 160 TABLET, FILM COATED ORAL at 08:52

## 2017-11-28 RX ADMIN — PRAZOSIN HYDROCHLORIDE 1 MG: 1 CAPSULE ORAL at 03:44

## 2017-11-28 RX ADMIN — PIOGLITAZONE HYDROCHLORIDE 30 MG: 15 TABLET ORAL at 21:14

## 2017-11-28 RX ADMIN — TUBERCULIN PURIFIED PROTEIN DERIVATIVE 5 UNITS: 5 INJECTION, SOLUTION INTRADERMAL at 03:46

## 2017-11-28 RX ADMIN — ASPIRIN 81 MG: 81 TABLET, COATED ORAL at 08:53

## 2017-11-28 RX ADMIN — INSULIN HUMAN 10 UNITS: 100 INJECTION, SUSPENSION SUBCUTANEOUS at 17:38

## 2017-11-28 RX ADMIN — CLONIDINE HYDROCHLORIDE 0.2 MG: 0.2 TABLET ORAL at 05:17

## 2017-11-28 RX ADMIN — GALANTAMINE 8 MG: 4 TABLET, FILM COATED ORAL at 09:49

## 2017-11-28 RX ADMIN — HYDRALAZINE HYDROCHLORIDE 25 MG: 25 TABLET, FILM COATED ORAL at 08:53

## 2017-11-28 RX ADMIN — AMLODIPINE BESYLATE 10 MG: 10 TABLET ORAL at 08:52

## 2017-11-28 NOTE — PROGRESS NOTES
Care Management Interventions  PCP Verified by CM: Yes (11/27/17)  Transition of Care Consult (CM Consult): Discharge Planning  Current Support Network: Lives with Spouse  Confirm Follow Up Transport: Family  Plan discussed with Pt/Family/Caregiver: Yes  Freedom of Choice Offered: Yes  Met with patient and wife for d/c planning. Patient alert and oriented x 3, independent of ADL's and lives with wife. He has a cane that he uses as needed and can still drive but wife reports drives very little. He is admitted with leg wound and wife states she has been told will need follow up at the wound clinic at discharge. Per notes by Moo Barker NP F/U with wound center at discharge for wound care appointment is made for Tuesday 12/5/17 at 1:45 with Dr. Edith moore daily until seen by wound center. Wife thinks patient may be d/c today. Discussed home health but wife states does not need dressing changes as she was told leave wound open to air unless wears pants then wrap with kerlix. Current d/c plan is home with wife to follow up with wound clinic.
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
## 2017-11-28 NOTE — ROUTINE PROCESS
TRANSFER - OUT REPORT:    Verbal report given to [facility] RN (name) on Carmen Fischer  being transferred to 330 (unit) for progression of care. Report consisted of patients Situation, Background, Assessment and   Recommendations(SBAR). Information from the following report(s) ED summary was reviewed with the receiving nurse. Opportunity for questions and clarification was provided. Patient transported with:   Antoni Moore and ER staff.

## 2017-11-28 NOTE — PROGRESS NOTES
Bedside and Verbal shift change report given to José Miguel Escalona RN by Mihai Camargo RN. Report included the following information SBAR, Kardex, Procedure Summary and Recent Results.

## 2017-11-28 NOTE — PROGRESS NOTES
TRANSFER - OUT REPORT:    Verbal report given to ALON Ugalde on Sheila Floss being transferred to  for routine progression of care       Report consisted of patients Situation, Background, Assessment and Recommendations (SBAR). Information from the following report(s) SBAR, Kardex, [facility] and Recent Results was reviewed with the receiving nurse. Opportunity for questions and clarification was provided. Receiving RN assumed care of patient.

## 2017-11-28 NOTE — PROGRESS NOTES
Bedside and Verbal shift change report given to self (oncoming nurse) by Aidan Carranza (offgoing nurse). Report included the following information SBAR, Kardex, MAR and Recent Results.

## 2017-11-28 NOTE — WOUND CARE
Patient seen for two large dry wounds to left lower leg close to ankle. Foot cool, edema present. Will get Rooke boot to help keep lower extremity warm and protected. Patient in agreement. he is diabetic. Discussed treatment plan. Will keep wound dry and paint with betadine to help decrease bacteria load. Dressing completed. Painted with betadine at this time. Updated nurse. Noted surgical consult. Wound team will monitor. Recommend outpatient wound clinic follow up.

## 2017-11-28 NOTE — PROGRESS NOTES
TRANSFER - IN REPORT:    Verbal report received from ALON Dewey on vIan Jefferson  being received from [address] [place] [address] ED for routine progression of care      Report consisted of patients Situation, Background, Assessment and   Recommendations(SBAR). Information from the following report(s) SBAR and ED Summary was reviewed with the receiving nurse. Opportunity for questions and clarification was provided. Assessment completed upon patients arrival to unit and care assumed.

## 2017-11-28 NOTE — PROGRESS NOTES
CM Specialist Afua Mae met with patient and discussed admission status. Medicare Outpatient Observation Notice provided to the patient. Oral explanation was provided and all questions answered. Signed document placed in the medical chart. Copy provided to patient.

## 2017-11-28 NOTE — PROGRESS NOTES
TRANSFER - IN REPORT:    Verbal report received from Kt patton on Abner December  being received from tele(unit) for routine progression of care      Report consisted of patients Situation, Background, Assessment and   Recommendations(SBAR). Information from the following report(s) SBAR, Kardex, Intake/Output and Recent Results was reviewed with the receiving nurse. Opportunity for questions and clarification was provided. Assessment completed upon patients arrival to unit and care assumed. Primary Nurse Sallie Gallegos RN and Audrey Vazquez RN performed a dual skin assessment on this patient Impairment noted- see wound doc flow sheet  Hugh score is 17   Left leg with large diabetic ulcer documented by wound care.

## 2017-11-28 NOTE — PROGRESS NOTES
Chart opened to assist primary RN. Spoke with Dr. Bob Deng for clarification regarding duplex ultrasounds that were ordered STAT. Received telephone orders with read-back that none of the ultrasounds or x-rays must be done STAT tonight, but may be done routine in the morning. Also discussed patient's BP of 196/91 with Dr. Bob Degn and received telephone orders with read-back to give the Catapress 0.2mg that was due at 0900 now with the Minipress 1mg that is due at 0300. In one hour after medication administration, recheck the blood pressure and may then give PRN IV Hydralazine if needed. Primary nurse, Saurabh Hernandez, ALON updated on orders and plan of care.

## 2017-11-28 NOTE — PROGRESS NOTES
Hospitalist Progress Note     Admit Date:  2017  6:06 PM   Name:  Laura Caldera   Age:  75 y.o.  :  1950   MRN:  [id number]   PCP:  Adeel Ortiz MD  Treatment Team: Attending Provider: Juan Valle MD; Consulting Provider: Wilmer Ruby MD; Utilization Review: Chris Cantu RN    Subjective:   Patient is a 76 y/o M with DM, HTN, CAD, who presented to ER from PCP office with ulcer of left leg. Placed in observation. No signs of infection. No fevers, WBC wnl, no purulence on admission. Already had one course of abx as outpatient.  - pt feels good. No complaints. Getting ready to work with PT. Fresh bandage on LLE. No CP, SOB    Objective:     Patient Vitals for the past 24 hrs:   Temp Pulse Resp BP SpO2   17 0850 98.9 °F (37.2 °C) 78 16 173/85 95 %   17 0318 98.9 °F (37.2 °C) 75 18 (!) 196/91 98 %   17 2135 - 85 - (!) 150/91 94 %   17 1611 97.8 °F (36.6 °C) 60 17 152/87 100 %     Oxygen Therapy  O2 Sat (%): 95 % (17 0850)  Pulse via Oximetry: 59 beats per minute (17 2135)  O2 Device: Room air (17 0850)    Intake/Output Summary (Last 24 hours) at 17 1042  Last data filed at 17 0851   Gross per 24 hour   Intake                0 ml   Output              750 ml   Net             -750 ml         General:    Well nourished. Alert. CV:   RRR. No murmur, rub, or gallop. Lungs:   CTAB. No wheezing, rhonchi, or rales. Abdomen:   Soft, nontender, nondistended. Extremities: Warm and dry. No cyanosis or edema. Bandage LLE c/d/i  Skin:     No rashes or jaundice. Data Review:  I have reviewed all labs, meds, telemetry events, and studies from the last 24 hours.     Recent Results (from the past 24 hour(s))   CBC WITH AUTOMATED DIFF    Collection Time: 17  4:17 PM   Result Value Ref Range    WBC 6.9 4.3 - 11.1 K/uL    RBC 4.15 (L) 4.23 - 5.67 M/uL    HGB 12.7 (L) 13.6 - 17.2 g/dL    HCT 37.7 (L) 41.1 - 50.3 %    MCV 90.8 79.6 - 97.8 FL    MCH 30.6 26.1 - 32.9 PG    MCHC 33.7 31.4 - 35.0 g/dL    RDW 15.9 (H) 11.9 - 14.6 %    PLATELET 604 953 - 195 K/uL    MPV 11.6 10.8 - 14.1 FL    DF AUTOMATED      NEUTROPHILS 61 43 - 78 %    LYMPHOCYTES 29 13 - 44 %    MONOCYTES 9 4.0 - 12.0 %    EOSINOPHILS 1 0.5 - 7.8 %    BASOPHILS 0 0.0 - 2.0 %    IMMATURE GRANULOCYTES 0 0.0 - 5.0 %    ABS. NEUTROPHILS 4.2 1.7 - 8.2 K/UL    ABS. LYMPHOCYTES 2.0 0.5 - 4.6 K/UL    ABS. MONOCYTES 0.6 0.1 - 1.3 K/UL    ABS. EOSINOPHILS 0.1 0.0 - 0.8 K/UL    ABS. BASOPHILS 0.0 0.0 - 0.2 K/UL    ABS. IMM. GRANS. 0.0 0.0 - 0.5 K/UL   METABOLIC PANEL, COMPREHENSIVE    Collection Time: 11/27/17  4:17 PM   Result Value Ref Range    Sodium 141 136 - 145 mmol/L    Potassium 3.7 3.5 - 5.1 mmol/L    Chloride 104 98 - 107 mmol/L    CO2 29 21 - 32 mmol/L    Anion gap 8 7 - 16 mmol/L    Glucose 113 (H) 65 - 100 mg/dL    BUN 14 8 - 23 MG/DL    Creatinine 1.08 0.8 - 1.5 MG/DL    GFR est AA >60 >60 ml/min/1.73m2    GFR est non-AA >60 >60 ml/min/1.73m2    Calcium 8.8 8.3 - 10.4 MG/DL    Bilirubin, total 0.4 0.2 - 1.1 MG/DL    ALT (SGPT) 18 12 - 65 U/L    AST (SGOT) 22 15 - 37 U/L    Alk.  phosphatase 90 50 - 136 U/L    Protein, total 7.6 6.3 - 8.2 g/dL    Albumin 2.9 (L) 3.2 - 4.6 g/dL    Globulin 4.7 (H) 2.3 - 3.5 g/dL    A-G Ratio 0.6 (L) 1.2 - 3.5     PROCALCITONIN    Collection Time: 11/27/17  4:17 PM   Result Value Ref Range    Procalcitonin <0.1 ng/mL   C REACTIVE PROTEIN, QT    Collection Time: 11/27/17  4:17 PM   Result Value Ref Range    C-Reactive protein 1.3 (H) 0.0 - 0.9 mg/dL   CULTURE, BLOOD    Collection Time: 11/27/17  9:05 PM   Result Value Ref Range    Special Requests: RIGHT  FOREARM        Culture result: NO GROWTH AFTER 8 HOURS     POC LACTIC ACID    Collection Time: 11/27/17  9:10 PM   Result Value Ref Range    Lactic Acid (POC) 1.1 0.5 - 1.9 mmol/L   EKG, 12 LEAD, INITIAL    Collection Time: 11/27/17  9:16 PM   Result Value Ref Range Ventricular Rate 82 BPM    Atrial Rate 82 BPM    P-R Interval 148 ms    QRS Duration 88 ms    Q-T Interval 392 ms    QTC Calculation (Bezet) 457 ms    Calculated P Axis 73 degrees    Calculated R Axis 16 degrees    Calculated T Axis 69 degrees    Diagnosis       !! AGE AND GENDER SPECIFIC ECG ANALYSIS !! Normal sinus rhythm  Septal infarct (cited on or before 23-APR-2015)  Abnormal ECG  When compared with ECG of 08-NOV-2017 19:33,  Vent. rate has increased BY  36 BPM  ST now depressed in Inferior leads  Confirmed by Sonal Nobles MD (), SKYLAR CALDERA ([id number]) on 11/28/2017 6:47:00 AM     CULTURE, BLOOD    Collection Time: 11/27/17  9:44 PM   Result Value Ref Range    Special Requests: RIGHT  FOREARM        Culture result: NO GROWTH AFTER 8 HOURS     CULTURE, WOUND W GRAM STAIN    Collection Time: 11/27/17 10:53 PM   Result Value Ref Range    Special Requests: LOWER LEFT     GRAM STAIN PENDING     Culture result:        NO GROWTH AFTER SHORT PERIOD OF INCUBATION. FURTHER RESULTS TO FOLLOW AFTER OVERNIGHT INCUBATION. METABOLIC PANEL, COMPREHENSIVE    Collection Time: 11/28/17  4:28 AM   Result Value Ref Range    Sodium 141 136 - 145 mmol/L    Potassium 3.5 3.5 - 5.1 mmol/L    Chloride 105 98 - 107 mmol/L    CO2 26 21 - 32 mmol/L    Anion gap 10 7 - 16 mmol/L    Glucose 155 (H) 65 - 100 mg/dL    BUN 11 8 - 23 MG/DL    Creatinine 0.86 0.8 - 1.5 MG/DL    GFR est AA >60 >60 ml/min/1.73m2    GFR est non-AA >60 >60 ml/min/1.73m2    Calcium 8.6 8.3 - 10.4 MG/DL    Bilirubin, total 0.6 0.2 - 1.1 MG/DL    ALT (SGPT) 16 12 - 65 U/L    AST (SGOT) 17 15 - 37 U/L    Alk.  phosphatase 86 50 - 136 U/L    Protein, total 7.0 6.3 - 8.2 g/dL    Albumin 2.7 (L) 3.2 - 4.6 g/dL    Globulin 4.3 (H) 2.3 - 3.5 g/dL    A-G Ratio 0.6 (L) 1.2 - 3.5     MAGNESIUM    Collection Time: 11/28/17  4:28 AM   Result Value Ref Range    Magnesium 1.7 (L) 1.8 - 2.4 mg/dL   HEMOGLOBIN A1C WITH EAG    Collection Time: 11/28/17  4:28 AM   Result Value Ref Range Hemoglobin A1c 6.8 (H) 4.8 - 6.0 %    Est. average glucose 148 mg/dL   TSH 3RD GENERATION    Collection Time: 11/28/17  4:28 AM   Result Value Ref Range    TSH 2.280 0.358 - 3.740 uIU/mL   TSH 3RD GENERATION    Collection Time: 11/28/17  4:28 AM   Result Value Ref Range    TSH 2.320 0.358 - 3.740 uIU/mL   LIPID PANEL    Collection Time: 11/28/17  4:28 AM   Result Value Ref Range    LIPID PROFILE          Cholesterol, total 114 <200 MG/DL    Triglyceride 60 35 - 150 MG/DL    HDL Cholesterol 52 40 - 60 MG/DL    LDL, calculated 50 <100 MG/DL    VLDL, calculated 12 6.0 - 23.0 MG/DL    CHOL/HDL Ratio 2.2     LACTIC ACID    Collection Time: 11/28/17  4:28 AM   Result Value Ref Range    Lactic acid 1.3 0.4 - 2.0 MMOL/L        All Micro Results     Procedure Component Value Units Date/Time    CULTURE, Yasmanya Apodaca STAIN [[id number]] Collected:  11/27/17 2253    Order Status:  Completed Specimen:  Leg Updated:  11/28/17 0808     Special Requests: LOWER LEFT     GRAM STAIN PENDING     Culture result:         NO GROWTH AFTER SHORT PERIOD OF INCUBATION. FURTHER RESULTS TO FOLLOW AFTER OVERNIGHT INCUBATION.     CULTURE, BLOOD [[id number]] Collected:  11/27/17 2144    Order Status:  Completed Specimen:  Blood from Blood Updated:  11/28/17 0621     Special Requests: --        RIGHT  FOREARM       Culture result: NO GROWTH AFTER 8 HOURS       CULTURE, BLOOD [[id number]] Collected:  11/27/17 2105    Order Status:  Completed Specimen:  Blood from Blood Updated:  11/28/17 0621     Special Requests: --        RIGHT  FOREARM       Culture result: NO GROWTH AFTER 8 HOURS       CULTURE, Laverta Apodaca STAIN [[id number]] Collected:  11/27/17 2223    Order Status:  Canceled Specimen:  Wound from Wound Drainage           Current Meds:  Current Facility-Administered Medications   Medication Dose Route Frequency    amLODIPine (NORVASC) tablet 10 mg  10 mg Oral DAILY    aspirin delayed-release tablet 81 mg  81 mg Oral DAILY    atorvastatin (LIPITOR) tablet 80 mg  80 mg Oral QHS    cloNIDine HCl (CATAPRES) tablet 0.2 mg  0.2 mg Oral BID    clopidogrel (PLAVIX) tablet 75 mg  75 mg Oral DAILY AFTER BREAKFAST    furosemide (LASIX) tablet 20 mg  20 mg Oral DAILY    gabapentin (NEURONTIN) capsule 300 mg  300 mg Oral TID    galantamine (RAZADYNE) tablet 8 mg  8 mg Oral BID    hydrALAZINE (APRESOLINE) tablet 25 mg  25 mg Oral TID    isosorbide mononitrate ER (IMDUR) tablet 30 mg  30 mg Oral DAILY    levothyroxine (SYNTHROID) tablet 25 mcg  25 mcg Oral ACB    metoprolol tartrate (LOPRESSOR) tablet 25 mg  25 mg Oral BID    morphine IR (MS IR) tablet 15 mg  15 mg Oral Q6H PRN    nitroglycerin (NITROSTAT) tablet 0.4 mg  0.4 mg SubLINGual Q5MIN PRN    oxybutynin (DITROPAN) tablet 5 mg  5 mg Oral QHS    pioglitazone (ACTOS) tablet 30 mg  30 mg Oral QHS    prazosin (MINIPRESS) capsule 1 mg  1 mg Oral QHS    risperiDONE (RisperDAL) tablet 1 mg  1 mg Oral QHS    valsartan (DIOVAN) tablet 160 mg  160 mg Oral DAILY    insulin lispro (HUMALOG) injection   SubCUTAneous AC&HS    insulin NPH (NOVOLIN N, HUMULIN N) injection 20 Units  20 Units SubCUTAneous ACB    insulin NPH (NOVOLIN N, HUMULIN N) injection 10 Units  10 Units SubCUTAneous ACD    tuberculin injection 5 Units  5 Units IntraDERMal ONCE    albuterol-ipratropium (DUO-NEB) 2.5 MG-0.5 MG/3 ML  3 mL Nebulization Q6H PRN    [START ON 11/29/2017] VANCOMYCIN TROUGH LEVEL REMINDER   Other ONCE    hydrALAZINE (APRESOLINE) 20 mg/mL injection 20 mg  20 mg IntraVENous Q6H PRN    heparin (porcine) injection 5,000 Units  5,000 Units SubCUTAneous Q8H       Other Studies (last 24 hours):  Xr Chest Sngl V    Result Date: 11/28/2017  Clinical history: Admission. TECHNIQUE: AP portable chest. COMPARISON: November 8, 2017. FINDINGS: No focal pulmonary consolidation, pleural effusion or pneumothorax. No pulmonary edema. Cardiac mediastinal contour is within normal limits. Stimulator wire is partially seen. IMPRESSION: No pulmonary consolidation or pulmonary edema. No significant change compared to prior exam.    Xr Tib/fib Lt    Result Date: 11/27/2017  Left tibia and fibula CLINICAL INDICATION: Ulcers along the distal aspect of the left leg, evaluate for osteomyelitis. FINDINGS: Four views of the left tibia and fibula submitted show extensive soft tissue swelling and ulceration along the distal lateral lower leg. No destructive bone changes noted to suspect osteomyelitis by plain radiography. IMPRESSION: Soft tissue swelling and ulcerations. No destructive bone changes noted to suspect osteomyelitis by plain radiography. Xr Ankle Lt Min 3 V    Result Date: 11/27/2017  Left ankle CLINICAL INDICATION: Left ankle pain and swelling for two weeks after a fall FINDINGS: Three views of the left ankle show no fracture or dislocation. Ankle mortise is intact. There is moderate generalized soft tissue swelling. IMPRESSION: Generalized soft tissue swelling diffusely about the ankle. No fracture or dislocation evident.       Assessment and Plan:     Hospital Problems as of 11/28/2017  Date Reviewed: 11/13/2017          Codes Class Noted - Resolved POA    * (Principal)Leg wound, left ICD-10-CM: H19.219E  ICD-9-CM: 891.0  11/27/2017 - Present Yes        Benign nodular prostatic hyperplasia without lower urinary tract symptoms (Chronic) ICD-10-CM: N40.0  ICD-9-CM: 600.10  5/25/2017 - Present Yes        History of CVA (cerebrovascular accident) (Chronic) ICD-10-CM: Z86.73  ICD-9-CM: V12.54  9/14/2016 - Present Yes        Type 2 diabetes mellitus with diabetic polyneuropathy ([facility] ICD-10-CM: E11.42  ICD-9-CM: 250.60, 357.2  7/13/2015 - Present Yes        Dementia (Chronic) ICD-10-CM: F03.90  ICD-9-CM: 294.20  5/2/2014 - Present Yes        Hypertension (Chronic) ICD-10-CM: I10  ICD-9-CM: 401.9  1/12/2012 - Present Yes        CAD (coronary artery disease) (Chronic) ICD-10-CM: I25.10  ICD-9-CM: 414.00  1/12/2012 - Present Yes    Overview Signed 1/12/2012  4:05 PM by Sybil Peterson     Post 3 stents                   PLAN:    · Await duplex and ASIF results lower extremities. Surgery recs appreciated for wound. I doubt infection; stop abx and observe. · No BGs documented for DM yet. Will order. Cont current meds   · Cont home meds for HTN. PRN hydralazine  · PT/OT  · Other chronic conditions stable, cont meds as shown    DC planning/Dispo:  Ppd ordered.   SW consulted  DVT ppx:  heparin    Signed:  Ted Wadsworth MD

## 2017-11-28 NOTE — H&P
[facility]   HISTORY AND PHYSICAL       Name:  Sylvie Bowens   MR#:  [id number]   :  1950   Account #:  [de-identified]   Date of Adm:  2017       TIME OF ADMISSION:  9 p.m. CHIEF COMPLAINT:  Ulcer in the left leg. HISTORY OF PRESENT ILLNESS:  This is a 74-year-old male patient   who has a past medical history of hypertension, diabetes type 2,   diabetic neuropathy, previous TIA, who came into the emergency   room, sent from his PCP's office after he was seen for an ulcer   in the left leg. This patient was seen in the emergency room of   [facility] on 2017. At that time, he was brought   to the emergency room because he was having left-sided weakness. The ER evaluation concluded that he did not have focal motor   weakness, but generalized weakness, and a brain CT scan which   was done at that time did not show evidence of any acute   intracranial abnormality. He was sent home with plan to follow   up with his primary care doctor. That very same night, the   patient fell off his bed and stayed on the floor the entire   night. According to his wife, she found him lying on the floor   around 3 or 4 a.m. During the next 20-48 hours, they noticed that   he developed large blister in the lateral aspect of the left   lower extremity. He was seen by his primary care doctor on   11/10/2017 and at that time he was started on p.o. Keflex. He   was reevaluated on 2017 by his PCP and he was doing fine and   for that reason, he was told to complete a 7-day course of   Keflex. Unfortunately, eventually his blister ruptured and he   developed 2 large ulcers in that area. He has developed a dry,   black tissue covering the ulcers, and he also has suffered   worsening edema of the left lower extremity. The patient also   complains of severe pain in this area and according to his wife,   he has become weaker every day.  Today he was seen again by his   primary care doctor and he suspected a necrotizing ulcer and for   that reason, he sent him to the emergency room. When he arrived here, his vital signs were blood pressure of   150/80, heart rate of 60, respiratory rate of 17, O2 saturation   of 95%. He was awake and alert, with no evidence of focal motor   weakness. His blood workup revealed normal white blood cell count,   stable hemoglobin level, normal kidney function, normal blood   glucose level, procalcitonin of less than 0.1. C-reactive   protein was 1.3. An x-ray of the left ankle was done and   reported generalized soft tissue swelling diffusely around the   ankle; no fracture or dislocation was reported. The area of the   ulcers apparently was not included in the x-ray. The patient received IV vancomycin and Zosyn in the emergency   room, and he was presented to the hospitalist team for   admission. REVIEW OF SYSTEMS:  A review of 14 systems was performed and it   was negative except for the findings that are reported in the   HPI. PAST MEDICAL HISTORY    1. Diabetes type 2.   2. History of asthma. 3. History of BPH. 4. Depression. 5. Diabetes type 2.   6. Hypertension. 7. Posttraumatic stress disorder. 8. History of seizure in the past.   9. History of TIA in the past.    PAST SURGICAL HISTORY    1. Several orthopedic surgeries. 2. Bone marrow aspirate and biopsy. 3. Left heart catheterization with 3 stents placed in 2008. Repeat left heart catheterization in 2015 with no intervention. 4. Insertion of spinal stimulator. SOCIAL HISTORY:  He was previously a smoker, but apparently he   has quit recently. Denies alcohol use or illicit drug use. FAMILY HISTORY:  Positive for hypertension and diabetes. ALLERGIES:  THE PATIENT IS ALLERGIC TO ACE INHIBITORS. PHYSICAL EXAMINATION   VITAL SIGNS: Blood pressure 140/85, heart rate 75, respiratory   rate of 18, O2 saturation of 95%. EYES: PERRLA.     EARS, NOSE, MOUTH AND THROAT: There is no evidence of pharyngeal   erythema, edema, or purulent exudates. RESPIRATORY: Clear breath sounds bilaterally. CARDIOVASCULAR: Regular rate and rhythm, with no murmurs. GASTROINTESTINAL: Abdomen is soft and nontender, with positive   bowel sounds. GENITOURINARY: Unremarkable. MUSCULOSKELETAL: The patient has 2 ulcers in the anterior and   lateral aspect of the left lower extremity. SKIN: The patient has 2 ulcers, in the anterior and lateral   aspect of the distal third of the left lower extremity. He has a   layer of what seems to be necrotic tissue covering his wounds. NEUROLOGIC: The patient is alert and oriented, with no evidence   of focal weakness. PSYCHIATRIC: Normal mood. HEMATOLOGIC/LYMPHATIC/IMMUNOLOGIC: No evidence of active   bleeding. No abnormal lymphadenopathy. LABORATORY AND IMAGING RESULTS:  White blood cell count 6.9,   hemoglobin 12.7, platelet count 055. Sodium 141, potassium 3.7,   chloride 104, anion gap 8, creatinine 1.08, calcium 8.8, total   bilirubin 0.4, ALT 18, AST 22. C-reactive protein 1.3. TSH 3.3. X-ray of the left ankle as previously described. Chest x-ray pending to be done. EKG pending to be done. ASSESSMENT:  This is a 68-year-old male patient who came into the   emergency room with 2 ulcers, in the anterior and lateral aspect   of the left lower extremity. There is no evidence of sepsis at   this time. The patient is going to be placed under observation. His initial length of stay is calculated to be less than 2   midnights. PLAN    1. Ulcers with some necrotic tissue on the anterior and lateral   aspect of the left lower extremity. There is no clinical   evidence of sepsis, and I do not see evidence of purulent   secretion coming out of his wound. For now, the patient will be   covered with antibiotics, and cultures have been ordered.  The   wound care team has been consulted, and General Surgery will be   requested to see the patient tomorrow for further   recommendations. I have ordered arterial duplex complex scan to   rule out the possibility of PVD, as the patient has weak pulses,   mainly in the left lower extremity. A venous ultrasound of the   left lower extremity has been also ordered. We will follow the   results. 2. History of hypertension. I will continue using his regular home   medications at this time. His vital signs will be monitored. 3. History of diabetes type 2 with complications. The patient is   using Novolin 70/30, 40 units in the morning and 32 units in the   evening. For now, I would use Novolin N 20 units in the morning   and 10 units in the evening, and Humalog sliding scales. Hemoglobin A1c has been ordered. 4. History of BPH. His regular dose of Ditropan and Minipress have   been ordered. 5. History of hypothyroidism. His regular dose of Synthroid has   been ordered. A TSH level will be checked. 6. History of posttraumatic stress disorder. Will continue using   his regular home medications. 7. Deep vein thrombosis prophylaxis. Subcutaneous heparin. 8. An EKG and a chest x-ray have been ordered. Results will need   to be followed.          MD Mounika Mcqueen / Felecia Duvall   D:  11/27/2017   21:41   T:  11/27/2017   22:31   Job #:  [id number]
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
## 2017-11-28 NOTE — PROGRESS NOTES
Problem: Mobility Impaired (Adult and Pediatric)  Goal: *Acute Goals and Plan of Care (Insert Text)  LTG:  (1.)Mr. Jeanie Foss will move from supine to sit and sit to supine , scoot up and down and roll side to side in bed with MODIFIED INDEPENDENCE within 7 day(s). (2.)Mr. Jeanie Foss will transfer from bed to chair and chair to bed with MODIFIED INDEPENDENCE using the least restrictive device within 7 day(s). (3.)Mr. Jeanie Foss will ambulate with SUPERVISION for 250 feet with the least restrictive device within 7 day(s). (4.)Mr. Jeanie Foss will participate in therapeutic activity/exerices x 23 minutes for increased strength within 7 days. (5.)Mr. Jeanie Foss will ascend and descend 5 stairs using 0 hand rail(s) with SUPERVISION to improve functional mobility and safety within 7 day(s). ________________________________________________________________________________________________      PHYSICAL THERAPY: Initial Assessment, AM 11/28/2017  OBSERVATION: Hospital Day: 2  Payor: Ricco Lopez / Plan: [facility] MEDICARE CHOICE PPO/PFFS / Product Type: Managed Care Medicare /      NAME/AGE/GENDER: Kelly Abebe is a 75 y.o. male   PRIMARY DIAGNOSIS: Leg wound, left Leg wound, left Leg wound, left        ICD-10: Treatment Diagnosis:   · Generalized Muscle Weakness (M62.81)  · Other abnormalities of gait and mobility (R26.89)  · Repeated Falls (R29.6)  · History of falling (Z91.81)   Precaution/Allergies:  Fosinopril and Lisinopril      ASSESSMENT:     Mr. Jeanie Foss is a 75 y.o. female in the hospital for the above who was supine in bed upon arrival.  Pt reports that he lives with his wife in a split level home with steps to enter. Pt also mentioned that he is normally independent with ADLs but uses a cane for ambulation with several recent falls. Mr. Jeanie Foss presents to PT with some decreased attention and delayed response. He also requires additional time for most mobility. Pt noted to have weakness in B hip flexors but WFL AROM.   His LEs also appear to be swollen as well with pt reporting hypersensitivity in L foot to light touch. Mr. Sheyla Floyd came to sitting on edge of bed with supervision and good sitting balance. He was able to stand with CGA but has fair standing balance. He ambulated with cane and CGA exhibiting decreased gait speed and antalgic gait. Pt observed to use step- to gait pattern with decreased L stance and R step length. Mr. Sheyla Floyd appears to be functioning below his baseline and could benefit from skilled PT. Mr. Sheyla Floyd was discharged from our facility before further treatment could be provided in this setting. This section established at most recent assessment   PROBLEM LIST (Impairments causing functional limitations):  1. Decreased Strength  2. Decreased Transfer Abilities  3. Decreased Ambulation Ability/Technique  4. Decreased Balance  5. Increased Pain   INTERVENTIONS PLANNED: (Benefits and precautions of physical therapy have been discussed with the patient.)  1. Balance Exercise  2. Bed Mobility  3. Family Education  4. Gait Training  5. Neuromuscular Re-education/Strengthening  6. Therapeutic Activites  7. Therapeutic Exercise/Strengthening  8. Transfer Training  9. Group Therapy     TREATMENT PLAN: Frequency/Duration: 3 times a week for duration of hospital stay  Rehabilitation Potential For Stated Goals: Good     RECOMMENDED REHABILITATION/EQUIPMENT: (at time of discharge pending progress): Due to the probability of continued deficits (see above) this patient will likely need continued skilled physical therapy after discharge.   Equipment:    Walkers, Type: Rolling Walker              HISTORY:   History of Present Injury/Illness (Reason for Referral):  Per H&P: \"HISTORY OF PRESENT ILLNESS:  This is a 70-year-old male patient   who has a past medical history of hypertension, diabetes type 2,   diabetic neuropathy, previous TIA, who came into the emergency   room, sent from his PCP's office after he was seen for an ulcer   in the left leg. This patient was seen in the emergency room of   [facility] on 11/08/2017. At that time, he was brought   to the emergency room because he was having left-sided weakness. The ER evaluation concluded that he did not have focal motor   weakness, but generalized weakness, and a brain CT scan which   was done at that time did not show evidence of any acute   intracranial abnormality. He was sent home with plan to follow   up with his primary care doctor. That very same night, the   patient fell off his bed and stayed on the floor the entire   night. According to his wife, she found him lying on the floor   around 3 or 4 a.m. During the next 20-48 hours, they noticed that   he developed large blister in the lateral aspect of the left   lower extremity. He was seen by his primary care doctor on   11/10/2017 and at that time he was started on p.o. Keflex. He   was reevaluated on 11/13/2017 by his PCP and he was doing fine and   for that reason, he was told to complete a 7-day course of   Keflex. Unfortunately, eventually his blister ruptured and he   developed 2 large ulcers in that area. He has developed a dry,   black tissue covering the ulcers, and he also has suffered   worsening edema of the left lower extremity. The patient also   complains of severe pain in this area and according to his wife,   he has become weaker every day. Today he was seen again by his   primary care doctor and he suspected a necrotizing ulcer and for   that reason, he sent him to the emergency room.      When he arrived here, his vital signs were blood pressure of   150/80, heart rate of 60, respiratory rate of 17, O2 saturation   of 95%. He was awake and alert, with no evidence of focal motor   weakness. His  exam revealed normal white blood cell count,   stable hemoglobin level, normal kidney function, normal blood   glucose level, procalcitonin of less than 0.1. C-reactive   protein was 1.3.  An x-ray of the left ankle was done and   reported generalized soft tissue swelling diffusely around the   ankle; no fracture or dislocation was reported. The area of the   ulcers apparently was not included in the x-ray.      The patient received IV vancomycin and Zosyn in the emergency   room, and he was presented to the hospitalist team for   Admission. \"  Past Medical History/Comorbidities:   Mr. Jamie Henry  has a past medical history of Abdominal complaints (12/18/2013); Arthritis; Asthma; BPH (benign prostatic hyperplasia); CAD (coronary artery disease); Chronic pain; COPD; Depression (emotion) (12/18/2013); Diabetes ([facility] [address] GERD (gastroesophageal reflux disease); Hypertension; Neuropathy; Neuropathy; Other ill-defined conditions(799.89); Post traumatic stress disorder (1/12/2012); PTSD (post-traumatic stress disorder); [facility] Stroke [facility]); Thrombocytopenia, unspecified (1/12/2012); and Thyroid disease. Mr. Jamie Henry  has a past surgical history that includes heent (2010); other surgical (1967/68); orthopaedic; bone marrow aspirate &biopsy (1/19/2012); cardiac surg procedure unlist; heart catheterization (4/23/2015); and back surgery. Social History/Living Environment:   Home Environment: Private residence  # Steps to Enter: 5  Rails to Enter: No  One/Two Story Residence: \[facility]\"" level  # of Interior Steps: 3  Interior Rails: Both  Living Alone: No  Support Systems: Spouse/Significant Other/Partner  Patient Expects to be Discharged to[de-identified] Unknown  Current DME Used/Available at Home: Cane, straight, Shower chair, Grab bars  Tub or Shower Type: Tub/Shower combination  Prior Level of Function/Work/Activity:  Lives with his wife in a split level house. Independent with ADLs but uses cane for ambulation with recent history of falls.      Number of Personal Factors/Comorbidities that affect the Plan of Care:  Chronic pain  Depression  COPD  Diabetes  PTSD  Seizures 3+: HIGH COMPLEXITY   EXAMINATION:   Most Recent Physical Functioning:   Gross Assessment:  AROM: Within functional limits  Strength: Generally decreased, functional  Sensation: Impaired (Hypersensitive L foot)               Posture:     Balance:  Sitting: Intact  Standing: Impaired  Standing - Static: Fair  Standing - Dynamic : Fair Bed Mobility:  Supine to Sit: Supervision  Sit to Supine: Supervision  Scooting: Total assistance  Wheelchair Mobility:     Transfers:  Sit to Stand: Contact guard assistance  Stand to Sit: Contact guard assistance  Gait:     Speed/Precious: Slow;Shuffled  Step Length: Right shortened  Stance: Left decreased  Gait Abnormalities: Decreased step clearance; Antalgic; Step to gait  Distance (ft): 40 Feet (ft)  Assistive Device: Cane, straight  Ambulation - Level of Assistance: Contact guard assistance      Body Structures Involved:  1. Nerves  2. Muscles Body Functions Affected:  1. Sensory/Pain  2. Neuromusculoskeletal  3. Movement Related  4. Skin Related Activities and Participation Affected:  1. General Tasks and Demands  2. Mobility  3. Domestic Life  4. Community, Social and [place]   Number of elements that affect the Plan of Care: 4+: HIGH COMPLEXITY   CLINICAL PRESENTATION:   Presentation: Evolving clinical presentation with changing clinical characteristics: MODERATE COMPLEXITY   CLINICAL DECISION MAKIN [facility] Inpatient Short Form  How much difficulty does the patient currently have. .. Unable A Lot A Little None   1. Turning over in bed (including adjusting bedclothes, sheets and blankets)? [] 1   [] 2   [] 3   [x] 4   2. Sitting down on and standing up from a chair with arms ( e.g., wheelchair, bedside commode, etc.)   [] 1   [] 2   [] 3   [x] 4   3. Moving from lying on back to sitting on the side of the bed? [] 1   [] 2   [] 3   [x] 4   How much help from another person does the patient currently need. .. Total A Lot A Little None   4.   Moving to and from a bed to a chair (including a wheelchair)? [] 1   [] 2   [x] 3   [] 4   5. Need to walk in hospital room? [] 1   [] 2   [x] 3   [] 4   6. Climbing 3-5 steps with a railing? [] 1   [x] 2   [] 3   [] 4   © 2007, Trustees of [employer], under license to [employer]. All rights reserved      Score:  Initial: 20 Most Recent: X (Date: -- )    Interpretation of Tool:  Represents activities that are increasingly more difficult (i.e. Bed mobility, Transfers, Gait). Score 24 23 22-20 19-15 14-10 9-7 6     Modifier CH CI CJ CK CL CM CN      ? Mobility - Walking and Moving Around:     - CURRENT STATUS: CJ - 20%-39% impaired, limited or restricted    - GOAL STATUS: CI - 1%-19% impaired, limited or restricted    - D/C STATUS:  CJ - 20%-39% impaired, limited or restricted  Payor: [employer] MEDICARE / Plan: [facility] PPO/PFFS / Product Type: Managed Care Medicare /      Medical Necessity:     · Patient demonstrates good rehab potential due to higher previous functional level. Reason for Services/Other Comments:  · Patient continues to require skilled intervention due to decreased functional mobility and balance. Use of outcome tool(s) and clinical judgement create a POC that gives a: Questionable prediction of patient's progress: MODERATE COMPLEXITY            TREATMENT:   (In addition to Assessment/Re-Assessment sessions the following treatments were rendered)   Pre-treatment Symptoms/Complaints:  L leg pain  Pain: Initial:   Pain Intensity 1: 7  Pain Location 1: Leg  Pain Orientation 1: Left  Post Session:  7/10     Assessment/Reassessment only, no treatment provided today    Braces/Orthotics/Lines/Etc:   · O2 Device: Room air  Treatment/Session Assessment:    · Response to Treatment: Pt tolerated fairly given required additional time with slow response.   · Interdisciplinary Collaboration:   o Physical Therapist  o Registered Nurse  o Physician  · After treatment position/precautions:   o Supine in bed  o Bed/Chair-wheels locked  o Bed in low position  o Call light within reach  o Side rails x 2  o Posey alarm activated   · Compliance with Program/Exercises: Will assess as treatment progresses. · Recommendations/Intent for next treatment session: \"Next visit will focus on advancements to more challenging activities and reduction in assistance provided\".   Total Treatment Duration:  PT Patient Time In/Time Out  Time In: 1040  Time Out: [address] Jarrett PT, DPT

## 2017-11-28 NOTE — PROGRESS NOTES
Pharmacokinetic Consult to Pharmacist    Carmen Amado is a 75 y.o. male being treated for diabetic foot ulcer and cellulitis with vancomycin and zosyn. Height: 6' 1\" (185.4 cm)  Weight: 108.9 kg (240 lb)  Lab Results   Component Value Date/Time    BUN 14 11/27/2017 04:17 PM    Creatinine 1.08 11/27/2017 04:17 PM    WBC 6.9 11/27/2017 04:17 PM    Procalcitonin <0.1 11/27/2017 04:17 PM    Lactic Acid (POC) 1.1 11/27/2017 09:10 PM      Estimated Creatinine Clearance: 84.7 mL/min (based on Cr of 1.08). CULTURES:  11/27 BCx: pending   Wound: pending      Day 1 of vancomycin. Goal trough is 12- 18. Vancomycin dose initiated at 2g load x1, then 1250 mg q 12h. Will continue to follow patient.       Thank you,  Bulmaro Esteves, PharmD  Clinical Pharmacist  [phone]

## 2017-11-29 VITALS
TEMPERATURE: 98.6 F | BODY MASS INDEX: 34.27 KG/M2 | HEIGHT: 73 IN | HEART RATE: 54 BPM | SYSTOLIC BLOOD PRESSURE: 139 MMHG | WEIGHT: 258.6 LBS | OXYGEN SATURATION: 95 % | RESPIRATION RATE: 20 BRPM | DIASTOLIC BLOOD PRESSURE: 69 MMHG

## 2017-11-29 PROBLEM — I70.25 ATHEROSCLEROSIS OF NATIVE ARTERY OF EXTREMITY WITH ULCERATION (HCC): Status: ACTIVE | Noted: 2017-11-29

## 2017-11-29 PROBLEM — I73.9 PAD (PERIPHERAL ARTERY DISEASE) (HCC): Status: ACTIVE | Noted: 2017-11-27

## 2017-11-29 PROBLEM — L97.909 ISCHEMIC LEG ULCER (HCC): Status: ACTIVE | Noted: 2017-11-27

## 2017-11-29 PROBLEM — I73.9 PAD (PERIPHERAL ARTERY DISEASE) (HCC): Status: ACTIVE | Noted: 2017-11-29

## 2017-11-29 LAB
GLUCOSE BLD STRIP.AUTO-MCNC: 113 MG/DL (ref 65–100)
GLUCOSE BLD STRIP.AUTO-MCNC: 85 MG/DL (ref 65–100)
MM INDURATION POC: 0 MM (ref 0–5)
PPD POC: NEGATIVE NEGATIVE

## 2017-11-29 PROCEDURE — G8980 MOBILITY D/C STATUS: HCPCS

## 2017-11-29 PROCEDURE — 99218 HC RM OBSERVATION: CPT

## 2017-11-29 PROCEDURE — 74011250637 HC RX REV CODE- 250/637: Performed by: INTERNAL MEDICINE

## 2017-11-29 PROCEDURE — 74011636637 HC RX REV CODE- 636/637: Performed by: INTERNAL MEDICINE

## 2017-11-29 PROCEDURE — 82962 GLUCOSE BLOOD TEST: CPT

## 2017-11-29 PROCEDURE — 74011250636 HC RX REV CODE- 250/636: Performed by: INTERNAL MEDICINE

## 2017-11-29 PROCEDURE — 96372 THER/PROPH/DIAG INJ SC/IM: CPT

## 2017-11-29 RX ADMIN — HYDRALAZINE HYDROCHLORIDE 25 MG: 25 TABLET, FILM COATED ORAL at 09:25

## 2017-11-29 RX ADMIN — METOPROLOL TARTRATE 25 MG: 25 TABLET ORAL at 09:25

## 2017-11-29 RX ADMIN — CLOPIDOGREL BISULFATE 75 MG: 75 TABLET ORAL at 09:25

## 2017-11-29 RX ADMIN — ISOSORBIDE MONONITRATE 30 MG: 30 TABLET, EXTENDED RELEASE ORAL at 09:25

## 2017-11-29 RX ADMIN — AMLODIPINE BESYLATE 10 MG: 10 TABLET ORAL at 09:25

## 2017-11-29 RX ADMIN — ASPIRIN 81 MG: 81 TABLET, COATED ORAL at 09:26

## 2017-11-29 RX ADMIN — LEVOTHYROXINE SODIUM 25 MCG: 50 TABLET ORAL at 05:13

## 2017-11-29 RX ADMIN — INSULIN HUMAN 20 UNITS: 100 INJECTION, SUSPENSION SUBCUTANEOUS at 08:00

## 2017-11-29 RX ADMIN — HEPARIN SODIUM 5000 UNITS: 5000 INJECTION, SOLUTION INTRAVENOUS; SUBCUTANEOUS at 09:31

## 2017-11-29 RX ADMIN — GABAPENTIN 300 MG: 300 CAPSULE ORAL at 09:26

## 2017-11-29 RX ADMIN — VALSARTAN 160 MG: 160 TABLET, FILM COATED ORAL at 09:25

## 2017-11-29 RX ADMIN — CLONIDINE HYDROCHLORIDE 0.2 MG: 0.2 TABLET ORAL at 09:25

## 2017-11-29 RX ADMIN — GALANTAMINE 8 MG: 4 TABLET, FILM COATED ORAL at 09:30

## 2017-11-29 RX ADMIN — FUROSEMIDE 20 MG: 20 TABLET ORAL at 09:25

## 2017-11-29 RX ADMIN — HEPARIN SODIUM 5000 UNITS: 5000 INJECTION, SOLUTION INTRAVENOUS; SUBCUTANEOUS at 02:49

## 2017-11-29 NOTE — DISCHARGE INSTRUCTIONS
DISCHARGE SUMMARY from Nurse    PATIENT INSTRUCTIONS:    After general anesthesia or intravenous sedation, for 24 hours or while taking prescription Narcotics:  · Limit your activities  · Do not drive and operate hazardous machinery  · Do not make important personal or business decisions  · Do  not drink alcoholic beverages  · If you have not urinated within 8 hours after discharge, please contact your surgeon on call. Report the following to your surgeon:  · Excessive pain, swelling, redness or odor of or around the surgical area  · Temperature over 100.5  · Nausea and vomiting lasting longer than 4 hours or if unable to take medications  · Any signs of decreased circulation or nerve impairment to extremity: change in color, persistent  numbness, tingling, coldness or increase pain  · Any questions    What to do at Home:  Recommended activity: Activity as tolerated. Continue daily betadine to leg    If you experience any of the following symptoms increased pain, fever greater than 101, nausea/vomiting, or worsening of wound, please follow up with your primary care physician. *  Please give a list of your current medications to your Primary Care Provider. *  Please update this list whenever your medications are discontinued, doses are      changed, or new medications (including over-the-counter products) are added. *  Please carry medication information at all times in case of emergency situations. These are general instructions for a healthy lifestyle:    No smoking/ No tobacco products/ Avoid exposure to second hand smoke  Surgeon General's Warning:  Quitting smoking now greatly reduces serious risk to your health.     Obesity, smoking, and sedentary lifestyle greatly increases your risk for illness    A healthy diet, regular physical exercise & weight monitoring are important for maintaining a healthy lifestyle    You may be retaining fluid if you have a history of heart failure or if you experience any of the following symptoms:  Weight gain of 3 pounds or more overnight or 5 pounds in a week, increased swelling in our hands or feet or shortness of breath while lying flat in bed. Please call your doctor as soon as you notice any of these symptoms; do not wait until your next office visit. Recognize signs and symptoms of STROKE:    F-face looks uneven    A-arms unable to move or move unevenly    S-speech slurred or non-existent    T-time-call 911 as soon as signs and symptoms begin-DO NOT go       Back to bed or wait to see if you get better-TIME IS BRAIN. Warning Signs of HEART ATTACK     Call 911 if you have these symptoms:   Chest discomfort. Most heart attacks involve discomfort in the center of the chest that lasts more than a few minutes, or that goes away and comes back. It can feel like uncomfortable pressure, squeezing, fullness, or pain.  Discomfort in other areas of the upper body. Symptoms can include pain or discomfort in one or both arms, the back, neck, jaw, or stomach.  Shortness of breath with or without chest discomfort.  Other signs may include breaking out in a cold sweat, nausea, or lightheadedness. Don't wait more than five minutes to call 911 - MINUTES MATTER! Fast action can save your life. Calling 911 is almost always the fastest way to get lifesaving treatment. Emergency Medical Services staff can begin treatment when they arrive -- up to an hour sooner than if someone gets to the hospital by car. The discharge information has been reviewed with the patient. The patient verbalized understanding. Discharge medications reviewed with the patient and appropriate educational materials and side effects teaching were provided.   ___________________________________________________________________________________________________________________________________

## 2017-11-29 NOTE — PROGRESS NOTES
Home with outpatient wound clinic. Family declined [facility] and there does not appear to be a need for it. [employer]  Giancarlo Clay

## 2017-11-29 NOTE — PROGRESS NOTES
Pt was calm and had a pain medication . He did not sleep all night . Hourly rounds were done and pt needs were met. Report will be given to day shift nurse and will continue to monitor.

## 2017-11-29 NOTE — DISCHARGE SUMMARY
Hospitalist Discharge Summary     Admit Date:  2017  6:06 PM   Name:  Pascual Gaspar   Age:  75 y.o.  :  1950   MRN:  [id number]   PCP:  Alma Rosa Faustin MD  Treatment Team: Attending Provider: Cleo Santana MD; Utilization Review: Jackie Pino RN    Problem List for this Hospitalization:  Hospital Problems as of 2017  Date Reviewed: 2017          Codes Class Noted - Resolved POA    PAD (peripheral artery disease) ([facility] ICD-10-CM: I73.9  ICD-9-CM: 443.9  2017 - Present Yes        * (Principal)Atherosclerosis of native artery of extremity with ulceration ([facility] ICD-10-CM: D09.04  ICD-9-CM: 440.23, 707.9  2017 - Present Yes        Benign nodular prostatic hyperplasia without lower urinary tract symptoms (Chronic) ICD-10-CM: N40.0  ICD-9-CM: 600.10  2017 - Present Yes        History of CVA (cerebrovascular accident) (Chronic) ICD-10-CM: Z86.73  ICD-9-CM: V12.54  2016 - Present Yes        Type 2 diabetes mellitus with diabetic polyneuropathy ([facility] ICD-10-CM: E11.42  ICD-9-CM: 250.60, 357.2  2015 - Present Yes        Dementia (Chronic) ICD-10-CM: F03.90  ICD-9-CM: 294.20  2014 - Present Yes        Hypertension (Chronic) ICD-10-CM: I10  ICD-9-CM: 401.9  2012 - Present Yes        CAD (coronary artery disease) (Chronic) ICD-10-CM: I25.10  ICD-9-CM: 414.00  2012 - Present Yes    Overview Signed 2012  4:05 PM by Anny Cooper 3 stents                     Admission HPI from 2017:    \" This is a 25-year-old male patient   who has a past medical history of hypertension, diabetes type 2,   diabetic neuropathy, previous TIA, who came into the emergency   room, sent from his PCP's office after he was seen for an ulcer   in the left leg. This patient was seen in the emergency room of   [facility] on 2017. At that time, he was brought   to the emergency room because he was having left-sided weakness.    The ER evaluation concluded that he did not have focal motor   weakness, but generalized weakness, and a brain CT scan which   was done at that time did not show evidence of any acute   intracranial abnormality. He was sent home with plan to follow   up with his primary care doctor. That very same night, the   patient fell off his bed and stayed on the floor the entire   night. According to his wife, she found him lying on the floor   around 3 or 4 a.m. During the next 20-48 hours, they noticed that   he developed large blister in the lateral aspect of the left   lower extremity. He was seen by his primary care doctor on   11/10/2017 and at that time he was started on p.o. Keflex. He   was reevaluated on 11/13/2017 by his PCP and he was doing fine and   for that reason, he was told to complete a 7-day course of   Keflex. Unfortunately, eventually his blister ruptured and he   developed 2 large ulcers in that area. He has developed a dry,   black tissue covering the ulcers, and he also has suffered   worsening edema of the left lower extremity. The patient also   complains of severe pain in this area and according to his wife,   he has become weaker every day. Today he was seen again by his   primary care doctor and he suspected a necrotizing ulcer and for   that reason, he sent him to the emergency room.      When he arrived here, his vital signs were blood pressure of   150/80, heart rate of 60, respiratory rate of 17, O2 saturation   of 95%. He was awake and alert, with no evidence of focal motor   weakness. His blood workup revealed normal white blood cell count,   stable hemoglobin level, normal kidney function, normal blood   glucose level, procalcitonin of less than 0.1. C-reactive   protein was 1.3. An x-ray of the left ankle was done and   reported generalized soft tissue swelling diffusely around the   ankle; no fracture or dislocation was reported.  The area of the   ulcers apparently was not included in the x-ray.      The patient received IV vancomycin and Zosyn in the emergency   room, and he was presented to the hospitalist team for   admission. \"    Hospital Course:  Patient is a 74 y/o M with DM, HTN, CAD, who presented to ER from PCP office with ulcer of left leg. Placed in observation. No signs of infection. No fevers, WBC wnl, no purulence on admission. Already had one course of abx as outpatient. Surgery saw and said no leg infection, no debridement needed, continue wound care. Wound culture was positive but superficial wound cultures are essentially worthless due to contamination. US LE arteries showed hemodynamically significant left iliac artery stenosis. Possible right iliac artery stenosis as well. He is stable and no gangrene or active ischemia currently. Is already on ASA, plavix. Will refer him to vascular surgery ASAP as outpatient for possible intervention to aid with healing. He will also have home health and wound clinic appointments. Follow up instructions below. Plan was discussed with pt. All questions answered. Patient was stable at time of discharge and was instructed to call or return if there are any concerns or recurrence of symptoms. Diagnostic Imaging/Tests:   Xr Chest Sngl V    Result Date: 11/28/2017  Clinical history: Admission. TECHNIQUE: AP portable chest. COMPARISON: November 8, 2017. FINDINGS: No focal pulmonary consolidation, pleural effusion or pneumothorax. No pulmonary edema. Cardiac mediastinal contour is within normal limits. Stimulator wire is partially seen. IMPRESSION: No pulmonary consolidation or pulmonary edema. No significant change compared to prior exam.    Xr Tib/fib Lt    Result Date: 11/27/2017  Left tibia and fibula CLINICAL INDICATION: Ulcers along the distal aspect of the left leg, evaluate for osteomyelitis.  FINDINGS: Four views of the left tibia and fibula submitted show extensive soft tissue swelling and ulceration along the distal lateral lower leg. No destructive bone changes noted to suspect osteomyelitis by plain radiography. IMPRESSION: Soft tissue swelling and ulcerations. No destructive bone changes noted to suspect osteomyelitis by plain radiography. Xr Ankle Lt Min 3 V    Result Date: 11/27/2017  Left ankle CLINICAL INDICATION: Left ankle pain and swelling for two weeks after a fall FINDINGS: Three views of the left ankle show no fracture or dislocation. Ankle mortise is intact. There is moderate generalized soft tissue swelling. IMPRESSION: Generalized soft tissue swelling diffusely about the ankle. No fracture or dislocation evident. Duplex Low Ext Arteries With Yanez Linear    Result Date: 11/29/2017  History: Peripheral arterial disease. FINDINGS: Duplex Doppler arterial ultrasound was performed of the lower extremity arterial system bilaterally. Color flow, grayscale, and spectral analysis was performed. Biphasic and triphasic waveforms in the right lower extremity. Monophasic waveforms in the left lower extremity. No segmental arterial occlusions. Elevated velocity in the right common femoral artery suggestive of a stenosis in the right external iliac artery. Resting ankle-brachial index on the right 0.89. Resting ankle-brachial index on the left 0.62. Toe index on the right 0.93. Toe index on the left 0.59. Pulse volume recordings in the great toes reveals mildly dampened waveforms. IMPRESSION: Findings consistent with a hemodynamically significant left iliac artery stenosis. Possible right iliac artery stenosis as well. Duplex Lower Ext Venous Left    Result Date: 11/28/2017  Left lower extremity Doppler evaluation: 11/28/2017 HISTORY: Moderate swelling x2 weeks. Grayscale, color-flow and Doppler evaluation of the major veins of the left lower extremity was performed. Comparison: None FINDINGS: There is normal compression and augmentation involving the left common femoral, superficial femoral and popliteal veins. No grayscale or color-flow findings within these vessels or within the distal greater saphenous or profunda femoral veins were noted to suggest deep venous thrombosis. Interrogated portions of the peroneal and posterior tibial veins were also unremarkable. No popliteal cyst is identified. Cursory imaging of the right common femoral vein reveals it to be patent with normal color-flow and augmentation. IMPRESSION: Negative evaluation for deep venous thrombosis within the left lower extremity. Echocardiogram results:  No results found for this visit on 11/27/17.       All Micro Results     Procedure Component Value Units Date/Time    CULTURE, Azra Clamp STAIN [[id number]]  (Abnormal) Collected:  11/27/17 2253    Order Status:  Completed Specimen:  Leg Updated:  11/29/17 0724     Special Requests: LOWER LEFT     GRAM STAIN 0 TO 1 WBC'S/OIF      RARE GRAM POSITIVE COCCI         RARE GRAM POSITIVE RODS         RARE GRAM NEGATIVE RODS        Culture result:         MODERATE GRAM NEGATIVE RODS SUBCULTURE IN PROGRESS (A)    CULTURE, BLOOD [[id number]] Collected:  11/27/17 2105    Order Status:  Completed Specimen:  Blood from Blood Updated:  11/29/17 0622     Special Requests: --        RIGHT  FOREARM       Culture result: NO GROWTH 2 DAYS       CULTURE, BLOOD [[id number]] Collected:  11/27/17 2144    Order Status:  Completed Specimen:  Blood from Blood Updated:  11/29/17 0622     Special Requests: --        RIGHT  FOREARM       Culture result: NO GROWTH 2 DAYS       CULTURE, WOUND Kathynohemy Yee STAIN [[id number]] Collected:  11/27/17 2223    Order Status:  Canceled Specimen:  Wound from Wound Drainage           Labs: Results:       BMP, Mg, Phos Recent Labs      11/28/17 0428 11/27/17   1617   NA  141  141   K  3.5  3.7   CL  105  104   CO2  26  29   AGAP  10  8   BUN  11  14   CREA  0.86  1.08   CA  8.6  8.8   GLU  155*  113*   MG  1.7*   --       CBC Recent Labs      11/28/17 0428 11/27/17   1617   WBC  6.0  6.9 RBC  3.98*  4.15*   HGB  12.2*  12.7*   HCT  36.0*  37.7*   PLT  206  206   GRANS   --   61   LYMPH   --   29   EOS   --   1   MONOS   --   9   BASOS   --   0   IG   --   0   ANEU   --   4.2   ABL   --   2.0   LEATHA   --   0.1   ABM   --   0.6   ABB   --   0.0   AIG   --   0.0      LFT Recent Labs      11/28/17   0428  11/27/17   1617   SGOT  17  22   ALT  16  18   AP  86  90   TP  7.0  7.6   ALB  2.7*  2.9*   GLOB  4.3*  4.7*   AGRAT  0.6*  0.6*      Cardiac Testing Lab Results   Component Value Date/Time    Troponin-I, Qt. <0.02 11/08/2017 07:40 PM    Troponin-I, Qt. <0.02 06/29/2016 09:47 PM    Troponin-I, Qt. 0.05 02/22/2016 03:00 PM      Coagulation Tests Lab Results   Component Value Date/Time    Prothrombin time 11.2 11/08/2017 08:47 PM    Prothrombin time 10.0 06/29/2016 09:47 PM    INR 1.0 11/08/2017 08:47 PM    INR 0.9 06/29/2016 09:47 PM    aPTT 24.5 11/08/2017 08:47 PM      A1c Lab Results   Component Value Date/Time    Hemoglobin A1c 6.8 11/28/2017 04:28 AM    Hemoglobin A1c 7.7 06/30/2016 12:53 PM      Lipid Panel Lab Results   Component Value Date/Time    Cholesterol, total 114 11/28/2017 04:28 AM    HDL Cholesterol 52 11/28/2017 04:28 AM    LDL, calculated 50 11/28/2017 04:28 AM    VLDL, calculated 12 11/28/2017 04:28 AM    Triglyceride 60 11/28/2017 04:28 AM    CHOL/HDL Ratio 2.2 11/28/2017 04:28 AM      Thyroid Panel Lab Results   Component Value Date/Time    TSH 2.280 11/28/2017 04:28 AM    TSH 2.320 11/28/2017 04:28 AM        Most Recent UA No results found for: COLOR, APPRN, REFSG, JODIE, PROTU, GLUCU, KETU, BILU, BLDU, UROU, BRENDA, LEUKU     Allergies   Allergen Reactions    Fosinopril Hives    Lisinopril Unknown (comments)     Immunization History   Administered Date(s) Administered    Influenza Vaccine 10/21/2013    Influenza Vaccine (Quad) Mdck Pf 09/26/2017    Pneumococcal Conjugate (PCV-13) 07/06/2016    Pneumococcal Polysaccharide (PPSV-23) 08/08/2014    TB Skin Test (PPD) Intradermal 06/30/2016, 11/28/2017       All Labs from Last 24 Hrs:  Recent Results (from the past 24 hour(s))   GLUCOSE, POC    Collection Time: 11/28/17 10:59 AM   Result Value Ref Range    Glucose (POC) 151 (H) 65 - 100 mg/dL   GLUCOSE, POC    Collection Time: 11/28/17  5:23 PM   Result Value Ref Range    Glucose (POC) 112 (H) 65 - 100 mg/dL   GLUCOSE, POC    Collection Time: 11/28/17  9:54 PM   Result Value Ref Range    Glucose (POC) 174 (H) 65 - 100 mg/dL   GLUCOSE, POC    Collection Time: 11/29/17  7:46 AM   Result Value Ref Range    Glucose (POC) 85 65 - 100 mg/dL       Discharge Exam:  Patient Vitals for the past 24 hrs:   Temp Pulse Resp BP SpO2   11/29/17 0722 98.1 °F (36.7 °C) 69 18 185/90 94 %   11/29/17 0355 97.5 °F (36.4 °C) (!) 54 18 157/77 91 %   11/28/17 2346 98.3 °F (36.8 °C) (!) 48 18 158/69 97 %   11/28/17 2149 98.5 °F (36.9 °C) (!) 53 18 169/70 95 %   11/28/17 1716 97.5 °F (36.4 °C) 61 18 196/82 96 %   11/28/17 1243 98.8 °F (37.1 °C) (!) 59 18 131/70 97 %     Oxygen Therapy  O2 Sat (%): 94 % (11/29/17 0722)  Pulse via Oximetry: 91 beats per minute (11/29/17 0355)  O2 Device: Room air (11/28/17 1243)  No intake or output data in the 24 hours ending 11/29/17 1035    General:    Well nourished. Alert. No distress. Eyes:   Normal sclera. Extraocular movements intact. ENT:  Normocephalic, atraumatic. Moist mucous membranes  CV:   Regular rate and rhythm. No murmur, rub, or gallop. Lungs:  Clear to auscultation bilaterally. No wheezing, rhonchi, or rales. Abdomen: Soft, nontender, nondistended. Bowel sounds normal.   Extremities: Warm and dry. No cyanosis or edema. Neurologic: CN II-XII grossly intact. Sensation intact. Skin:     No rashes or jaundice. Psych:  Normal mood and affect. Discharge Info:   Current Discharge Medication List      CONTINUE these medications which have NOT CHANGED    Details   risperiDONE (RISPERDAL) 2 mg tablet Take 1 mg by mouth daily.       levothyroxine (SYNTHROID) 25 mcg tablet Take  by mouth Daily (before breakfast). oxybutynin (DITROPAN) 5 mg tablet Take 5 mg by mouth nightly. isosorbide mononitrate ER (IMDUR) 30 mg tablet Take  by mouth daily. cyanocobalamin (VITAMIN B-12) 1,000 mcg tablet Take 1,000 mcg by mouth daily. gabapentin (NEURONTIN) 600 mg tablet Take 1 Tab by mouth three (3) times daily. Qty: 270 Tab, Refills: 2    Associated Diagnoses: Cervical radiculopathy; Lumbar radiculopathy; Foraminal stenosis of cervical region      valsartan (DIOVAN) 160 mg tablet Take 1 Tab by mouth daily. Qty: 90 Tab, Refills: 2    Associated Diagnoses: Benign essential HTN      clopidogrel (PLAVIX) 75 mg tab Take 1 Tab by mouth daily (after breakfast). Qty: 90 Tab, Refills: 3      furosemide (LASIX) 20 mg tablet Take 1 Tab by mouth daily. Take  by mouth daily. Qty: 90 Tab, Refills: 3    Associated Diagnoses: Benign essential HTN      potassium chloride (K-DUR, KLOR-CON) 20 mEq tablet Take 1 Tab by mouth daily. Qty: 90 Tab, Refills: 3      prazosin (MINIPRESS) 1 mg capsule Take 1 Cap by mouth nightly. Qty: 90 Cap, Refills: 3      hydrALAZINE (APRESOLINE) 50 mg tablet Take 0.5 Tabs by mouth three (3) times daily. Qty: 90 Tab, Refills: 0    Associated Diagnoses: Benign essential HTN      aspirin delayed-release 81 mg tablet Take 1 Tab by mouth daily. galantamine (RAZADYNE) 8 mg tablet Take 8 mg by mouth two (2) times a day. atorvastatin (LIPITOR) 80 mg tablet Take 1 Tab by mouth daily. Qty: 90 Tab, Refills: 3    Associated Diagnoses: Coronary artery disease involving native coronary artery without angina pectoris      amLODIPine (NORVASC) 10 mg tablet Take  by mouth daily. clonidine (CATAPRESS) 0.2 mg tablet Take 0.2 mg by mouth two (2) times a day. insulin NPH (NOVOLIN N) 100 unit/mL injection 40 Units by SubCUTAneous route once.  40 UNITS QAM, 32 UNITS QHS  Indications: type 2 diabetes mellitus      metoprolol (LOPRESSOR) 50 mg tablet Take 25 mg by mouth two (2) times a day. morphine IR (MS IR) 15 mg tablet Take  by mouth every four (4) hours as needed. nitroglycerin (NITROSTAT) 0.4 mg SL tablet by SubLINGual route every five (5) minutes as needed. pioglitazone (ACTOS) 30 mg tablet Take 1 Tab by mouth nightly. Qty: 90 Tab, Refills: 2    Associated Diagnoses: Type 2 diabetes mellitus with diabetic polyneuropathy, with long-term current use of insulin (HCC)               Disposition: home    Activity: PT/OT per Home Health  Diet: DIET DIABETIC CONSISTENT CARB Regular    Follow-up Appointments   Procedures    FOLLOW UP VISIT Appointment in: 3 - 5 Days Vascular surgery for hemodynamically significant left iliac artery stenosis. Possible right iliac artery stenosis as well. Vascular surgery for hemodynamically significant left iliac artery  stenosis. Possible right iliac artery stenosis as well. Standing Status:   Standing     Number of Occurrences:   1     Order Specific Question:   Appointment in     Answer:   3 - 5 Days         Follow-up Information     Follow up With Details Comments Philip Kayser, MD Call As needed [address]  [phone]      [facility] In 3 days early next week for vascular surgery follow up Ilana Mccull[address] [facility] [id number] [phone]    Wound Clinic Go to routine wound care           Time spent in patient discharge planning and coordination 35 minutes.     Signed:  Karen Pozo MD

## 2017-11-29 NOTE — PROGRESS NOTES
Discharge instructions and prescriptions given and reviewed with pt, verbalizes understanding, pt to be discharged home, waiting on ride.

## 2017-12-02 LAB
BACTERIA SPEC CULT: ABNORMAL
BACTERIA SPEC CULT: ABNORMAL
BACTERIA SPEC CULT: NORMAL
BACTERIA SPEC CULT: NORMAL
GRAM STN SPEC: ABNORMAL
SERVICE CMNT-IMP: ABNORMAL
SERVICE CMNT-IMP: NORMAL
SERVICE CMNT-IMP: NORMAL

## 2017-12-05 ENCOUNTER — APPOINTMENT (OUTPATIENT)
Dept: WOUND CARE | Age: 67
End: 2017-12-05
Attending: SURGERY

## 2017-12-08 ENCOUNTER — HOSPITAL ENCOUNTER (OUTPATIENT)
Dept: WOUND CARE | Age: 67
Discharge: HOME | End: 2017-12-08
Attending: SURGERY
Payer: MEDICARE

## 2017-12-08 PROCEDURE — 99215 OFFICE O/P EST HI 40 MIN: CPT

## 2017-12-08 PROCEDURE — 99205 OFFICE O/P NEW HI 60 MIN: CPT

## 2017-12-11 ENCOUNTER — HOME HEALTH ADMISSION (OUTPATIENT)
Dept: HOME HEALTH SERVICES | Facility: HOME HEALTH | Age: 67
End: 2017-12-11
Payer: MEDICARE

## 2017-12-12 ENCOUNTER — HOME CARE VISIT (OUTPATIENT)
Dept: SCHEDULING | Facility: HOME HEALTH | Age: 67
End: 2017-12-12
Payer: MEDICARE

## 2017-12-12 VITALS
HEART RATE: 56 BPM | TEMPERATURE: 98 F | SYSTOLIC BLOOD PRESSURE: 168 MMHG | DIASTOLIC BLOOD PRESSURE: 92 MMHG | OXYGEN SATURATION: 96 % | RESPIRATION RATE: 16 BRPM

## 2017-12-12 PROCEDURE — 3331090001 HH PPS REVENUE CREDIT

## 2017-12-12 PROCEDURE — 3331090002 HH PPS REVENUE DEBIT

## 2017-12-12 PROCEDURE — 400013 HH SOC

## 2017-12-12 PROCEDURE — G0299 HHS/HOSPICE OF RN EA 15 MIN: HCPCS

## 2017-12-13 PROCEDURE — 3331090001 HH PPS REVENUE CREDIT

## 2017-12-13 PROCEDURE — 3331090002 HH PPS REVENUE DEBIT

## 2017-12-14 PROCEDURE — 3331090002 HH PPS REVENUE DEBIT

## 2017-12-14 PROCEDURE — 3331090001 HH PPS REVENUE CREDIT

## 2017-12-15 PROCEDURE — 3331090001 HH PPS REVENUE CREDIT

## 2017-12-15 PROCEDURE — 3331090002 HH PPS REVENUE DEBIT

## 2017-12-16 PROCEDURE — 3331090001 HH PPS REVENUE CREDIT

## 2017-12-16 PROCEDURE — 3331090002 HH PPS REVENUE DEBIT

## 2017-12-17 PROCEDURE — 3331090001 HH PPS REVENUE CREDIT

## 2017-12-17 PROCEDURE — 3331090002 HH PPS REVENUE DEBIT

## 2017-12-18 ENCOUNTER — HOME CARE VISIT (OUTPATIENT)
Dept: SCHEDULING | Facility: HOME HEALTH | Age: 67
End: 2017-12-18
Payer: MEDICARE

## 2017-12-18 PROCEDURE — G0299 HHS/HOSPICE OF RN EA 15 MIN: HCPCS

## 2017-12-18 PROCEDURE — 3331090002 HH PPS REVENUE DEBIT

## 2017-12-18 PROCEDURE — 3331090001 HH PPS REVENUE CREDIT

## 2017-12-19 VITALS
OXYGEN SATURATION: 98 % | TEMPERATURE: 97.8 F | RESPIRATION RATE: 20 BRPM | SYSTOLIC BLOOD PRESSURE: 140 MMHG | HEART RATE: 59 BPM | DIASTOLIC BLOOD PRESSURE: 82 MMHG

## 2017-12-19 PROCEDURE — 3331090001 HH PPS REVENUE CREDIT

## 2017-12-19 PROCEDURE — 3331090002 HH PPS REVENUE DEBIT

## 2017-12-20 ENCOUNTER — HOME CARE VISIT (OUTPATIENT)
Dept: SCHEDULING | Facility: HOME HEALTH | Age: 67
End: 2017-12-20
Payer: MEDICARE

## 2017-12-20 VITALS
TEMPERATURE: 98.3 F | HEART RATE: 56 BPM | SYSTOLIC BLOOD PRESSURE: 148 MMHG | DIASTOLIC BLOOD PRESSURE: 79 MMHG | OXYGEN SATURATION: 95 %

## 2017-12-20 PROCEDURE — G0299 HHS/HOSPICE OF RN EA 15 MIN: HCPCS

## 2017-12-20 PROCEDURE — 3331090002 HH PPS REVENUE DEBIT

## 2017-12-20 PROCEDURE — 3331090001 HH PPS REVENUE CREDIT

## 2017-12-21 PROCEDURE — 3331090002 HH PPS REVENUE DEBIT

## 2017-12-21 PROCEDURE — 3331090001 HH PPS REVENUE CREDIT

## 2017-12-22 ENCOUNTER — HOME CARE VISIT (OUTPATIENT)
Dept: SCHEDULING | Facility: HOME HEALTH | Age: 67
End: 2017-12-22
Payer: MEDICARE

## 2017-12-22 VITALS
TEMPERATURE: 97.8 F | OXYGEN SATURATION: 94 % | DIASTOLIC BLOOD PRESSURE: 82 MMHG | HEART RATE: 56 BPM | SYSTOLIC BLOOD PRESSURE: 156 MMHG

## 2017-12-22 PROCEDURE — 3331090002 HH PPS REVENUE DEBIT

## 2017-12-22 PROCEDURE — G0299 HHS/HOSPICE OF RN EA 15 MIN: HCPCS

## 2017-12-22 PROCEDURE — 3331090001 HH PPS REVENUE CREDIT

## 2017-12-23 PROCEDURE — 3331090002 HH PPS REVENUE DEBIT

## 2017-12-23 PROCEDURE — 3331090001 HH PPS REVENUE CREDIT

## 2017-12-24 PROCEDURE — 3331090001 HH PPS REVENUE CREDIT

## 2017-12-24 PROCEDURE — 3331090002 HH PPS REVENUE DEBIT

## 2017-12-25 PROCEDURE — 3331090002 HH PPS REVENUE DEBIT

## 2017-12-25 PROCEDURE — 3331090001 HH PPS REVENUE CREDIT

## 2017-12-26 ENCOUNTER — HOSPITAL ENCOUNTER (OUTPATIENT)
Dept: WOUND CARE | Age: 67
Discharge: HOME | End: 2017-12-26
Attending: SURGERY
Payer: MEDICARE

## 2017-12-26 PROCEDURE — 97598 DBRDMT OPN WND ADDL 20CM/<: CPT

## 2017-12-26 PROCEDURE — 3331090002 HH PPS REVENUE DEBIT

## 2017-12-26 PROCEDURE — 97597 DBRDMT OPN WND 1ST 20 CM/<: CPT

## 2017-12-26 PROCEDURE — 3331090001 HH PPS REVENUE CREDIT

## 2017-12-27 PROCEDURE — 3331090002 HH PPS REVENUE DEBIT

## 2017-12-27 PROCEDURE — 3331090001 HH PPS REVENUE CREDIT

## 2017-12-28 ENCOUNTER — HOME CARE VISIT (OUTPATIENT)
Dept: SCHEDULING | Facility: HOME HEALTH | Age: 67
End: 2017-12-28
Payer: MEDICARE

## 2017-12-28 VITALS
OXYGEN SATURATION: 98 % | HEART RATE: 54 BPM | SYSTOLIC BLOOD PRESSURE: 154 MMHG | DIASTOLIC BLOOD PRESSURE: 85 MMHG | TEMPERATURE: 98.3 F

## 2017-12-28 PROCEDURE — 3331090002 HH PPS REVENUE DEBIT

## 2017-12-28 PROCEDURE — G0299 HHS/HOSPICE OF RN EA 15 MIN: HCPCS

## 2017-12-28 PROCEDURE — 3331090001 HH PPS REVENUE CREDIT

## 2017-12-29 PROCEDURE — 3331090001 HH PPS REVENUE CREDIT

## 2017-12-29 PROCEDURE — 3331090002 HH PPS REVENUE DEBIT

## 2017-12-30 PROCEDURE — 3331090001 HH PPS REVENUE CREDIT

## 2017-12-30 PROCEDURE — 3331090002 HH PPS REVENUE DEBIT

## 2017-12-31 PROCEDURE — 3331090001 HH PPS REVENUE CREDIT

## 2017-12-31 PROCEDURE — 3331090002 HH PPS REVENUE DEBIT

## 2018-01-01 PROCEDURE — 3331090001 HH PPS REVENUE CREDIT

## 2018-01-01 PROCEDURE — 3331090002 HH PPS REVENUE DEBIT

## 2018-01-02 PROCEDURE — 3331090001 HH PPS REVENUE CREDIT

## 2018-01-02 PROCEDURE — 3331090002 HH PPS REVENUE DEBIT

## 2018-01-03 PROCEDURE — 3331090001 HH PPS REVENUE CREDIT

## 2018-01-03 PROCEDURE — 3331090002 HH PPS REVENUE DEBIT

## 2018-01-04 ENCOUNTER — HOME CARE VISIT (OUTPATIENT)
Dept: SCHEDULING | Facility: HOME HEALTH | Age: 68
End: 2018-01-04
Payer: MEDICARE

## 2018-01-04 PROCEDURE — 3331090002 HH PPS REVENUE DEBIT

## 2018-01-04 PROCEDURE — 3331090001 HH PPS REVENUE CREDIT

## 2018-01-04 PROCEDURE — G0299 HHS/HOSPICE OF RN EA 15 MIN: HCPCS

## 2018-01-04 PROCEDURE — 400013 HH SOC

## 2018-01-05 VITALS
DIASTOLIC BLOOD PRESSURE: 72 MMHG | OXYGEN SATURATION: 94 % | OXYGEN SATURATION: 94 % | TEMPERATURE: 98.3 F | SYSTOLIC BLOOD PRESSURE: 138 MMHG | HEART RATE: 78 BPM | HEART RATE: 78 BPM | DIASTOLIC BLOOD PRESSURE: 72 MMHG | TEMPERATURE: 98.3 F | SYSTOLIC BLOOD PRESSURE: 138 MMHG

## 2018-01-05 PROCEDURE — 3331090002 HH PPS REVENUE DEBIT

## 2018-01-05 PROCEDURE — 3331090001 HH PPS REVENUE CREDIT

## 2018-01-06 PROCEDURE — 3331090002 HH PPS REVENUE DEBIT

## 2018-01-06 PROCEDURE — 3331090001 HH PPS REVENUE CREDIT

## 2018-01-07 PROCEDURE — 3331090002 HH PPS REVENUE DEBIT

## 2018-01-07 PROCEDURE — 3331090001 HH PPS REVENUE CREDIT

## 2018-01-08 PROCEDURE — 3331090002 HH PPS REVENUE DEBIT

## 2018-01-08 PROCEDURE — 3331090001 HH PPS REVENUE CREDIT

## 2018-01-09 ENCOUNTER — HOSPITAL ENCOUNTER (OUTPATIENT)
Dept: WOUND CARE | Age: 68
Discharge: HOME | End: 2018-01-09
Attending: SURGERY
Payer: MEDICARE

## 2018-01-09 PROCEDURE — 97598 DBRDMT OPN WND ADDL 20CM/<: CPT

## 2018-01-09 PROCEDURE — 97597 DBRDMT OPN WND 1ST 20 CM/<: CPT

## 2018-01-09 PROCEDURE — 3331090001 HH PPS REVENUE CREDIT

## 2018-01-09 PROCEDURE — 3331090002 HH PPS REVENUE DEBIT

## 2018-01-10 PROCEDURE — 3331090001 HH PPS REVENUE CREDIT

## 2018-01-10 PROCEDURE — 3331090002 HH PPS REVENUE DEBIT

## 2018-01-11 ENCOUNTER — HOME CARE VISIT (OUTPATIENT)
Dept: SCHEDULING | Facility: HOME HEALTH | Age: 68
End: 2018-01-11
Payer: MEDICARE

## 2018-01-11 VITALS
HEART RATE: 56 BPM | DIASTOLIC BLOOD PRESSURE: 86 MMHG | OXYGEN SATURATION: 97 % | RESPIRATION RATE: 20 BRPM | TEMPERATURE: 98.9 F | SYSTOLIC BLOOD PRESSURE: 159 MMHG

## 2018-01-11 PROCEDURE — 3331090002 HH PPS REVENUE DEBIT

## 2018-01-11 PROCEDURE — 3331090001 HH PPS REVENUE CREDIT

## 2018-01-11 PROCEDURE — G0299 HHS/HOSPICE OF RN EA 15 MIN: HCPCS

## 2018-01-12 PROCEDURE — 3331090001 HH PPS REVENUE CREDIT

## 2018-01-12 PROCEDURE — 3331090002 HH PPS REVENUE DEBIT

## 2018-01-13 PROCEDURE — 3331090001 HH PPS REVENUE CREDIT

## 2018-01-13 PROCEDURE — 3331090002 HH PPS REVENUE DEBIT

## 2018-01-14 PROCEDURE — 3331090001 HH PPS REVENUE CREDIT

## 2018-01-14 PROCEDURE — 3331090002 HH PPS REVENUE DEBIT

## 2018-01-15 PROCEDURE — 3331090002 HH PPS REVENUE DEBIT

## 2018-01-15 PROCEDURE — 3331090001 HH PPS REVENUE CREDIT

## 2018-01-16 PROCEDURE — 3331090001 HH PPS REVENUE CREDIT

## 2018-01-16 PROCEDURE — 3331090002 HH PPS REVENUE DEBIT

## 2018-01-17 ENCOUNTER — HOSPITAL ENCOUNTER (EMERGENCY)
Age: 68
Discharge: HOME | End: 2018-01-17
Attending: EMERGENCY MEDICINE
Payer: MEDICARE

## 2018-01-17 ENCOUNTER — HOME CARE VISIT (OUTPATIENT)
Dept: SCHEDULING | Facility: HOME HEALTH | Age: 68
End: 2018-01-17
Payer: MEDICARE

## 2018-01-17 VITALS
SYSTOLIC BLOOD PRESSURE: 158 MMHG | WEIGHT: 240 LBS | HEART RATE: 54 BPM | DIASTOLIC BLOOD PRESSURE: 73 MMHG | HEIGHT: 73 IN | RESPIRATION RATE: 16 BRPM | BODY MASS INDEX: 31.81 KG/M2 | OXYGEN SATURATION: 99 % | TEMPERATURE: 98 F

## 2018-01-17 DIAGNOSIS — E16.2 HYPOGLYCEMIA: Primary | ICD-10-CM

## 2018-01-17 LAB
ALBUMIN SERPL-MCNC: 2.9 G/DL (ref 3.2–4.6)
ALBUMIN/GLOB SERPL: 0.7 {RATIO} (ref 1.2–3.5)
ALP SERPL-CCNC: 74 U/L (ref 50–136)
ALT SERPL-CCNC: 16 U/L (ref 12–65)
ANION GAP SERPL CALC-SCNC: 7 MMOL/L (ref 7–16)
AST SERPL-CCNC: 21 U/L (ref 15–37)
ATRIAL RATE: 44 BPM
BASOPHILS # BLD: 0 K/UL (ref 0–0.2)
BASOPHILS NFR BLD: 1 % (ref 0–2)
BILIRUB SERPL-MCNC: 0.2 MG/DL (ref 0.2–1.1)
BUN SERPL-MCNC: 14 MG/DL (ref 8–23)
CALCIUM SERPL-MCNC: 8.5 MG/DL (ref 8.3–10.4)
CALCULATED P AXIS, ECG09: 78 DEGREES
CALCULATED R AXIS, ECG10: 9 DEGREES
CALCULATED T AXIS, ECG11: 69 DEGREES
CHLORIDE SERPL-SCNC: 107 MMOL/L (ref 98–107)
CO2 SERPL-SCNC: 28 MMOL/L (ref 21–32)
CREAT SERPL-MCNC: 1.2 MG/DL (ref 0.8–1.5)
DIAGNOSIS, 93000: NORMAL
DIFFERENTIAL METHOD BLD: ABNORMAL
EOSINOPHIL # BLD: 0.1 K/UL (ref 0–0.8)
EOSINOPHIL NFR BLD: 2 % (ref 0.5–7.8)
ERYTHROCYTE [DISTWIDTH] IN BLOOD BY AUTOMATED COUNT: 15.5 % (ref 11.9–14.6)
GLOBULIN SER CALC-MCNC: 4.1 G/DL (ref 2.3–3.5)
GLUCOSE BLD STRIP.AUTO-MCNC: 120 MG/DL (ref 65–100)
GLUCOSE BLD STRIP.AUTO-MCNC: 124 MG/DL (ref 65–100)
GLUCOSE SERPL-MCNC: 125 MG/DL (ref 65–100)
HCT VFR BLD AUTO: 36 % (ref 41.1–50.3)
HGB BLD-MCNC: 11.8 G/DL (ref 13.6–17.2)
IMM GRANULOCYTES # BLD: 0 K/UL (ref 0–0.5)
IMM GRANULOCYTES NFR BLD AUTO: 0 % (ref 0–5)
LYMPHOCYTES # BLD: 1.9 K/UL (ref 0.5–4.6)
LYMPHOCYTES NFR BLD: 33 % (ref 13–44)
MCH RBC QN AUTO: 30.4 PG (ref 26.1–32.9)
MCHC RBC AUTO-ENTMCNC: 32.8 G/DL (ref 31.4–35)
MCV RBC AUTO: 92.8 FL (ref 79.6–97.8)
MONOCYTES # BLD: 0.4 K/UL (ref 0.1–1.3)
MONOCYTES NFR BLD: 7 % (ref 4–12)
NEUTS SEG # BLD: 3.2 K/UL (ref 1.7–8.2)
NEUTS SEG NFR BLD: 57 % (ref 43–78)
P-R INTERVAL, ECG05: 188 MS
PLATELET # BLD AUTO: 186 K/UL (ref 150–450)
PMV BLD AUTO: 11.4 FL (ref 10.8–14.1)
POTASSIUM SERPL-SCNC: 4.4 MMOL/L (ref 3.5–5.1)
PROT SERPL-MCNC: 7 G/DL (ref 6.3–8.2)
Q-T INTERVAL, ECG07: 508 MS
QRS DURATION, ECG06: 90 MS
QTC CALCULATION (BEZET), ECG08: 434 MS
RBC # BLD AUTO: 3.88 M/UL (ref 4.23–5.67)
SODIUM SERPL-SCNC: 142 MMOL/L (ref 136–145)
VENTRICULAR RATE, ECG03: 44 BPM
WBC # BLD AUTO: 5.6 K/UL (ref 4.3–11.1)

## 2018-01-17 PROCEDURE — 80053 COMPREHEN METABOLIC PANEL: CPT | Performed by: EMERGENCY MEDICINE

## 2018-01-17 PROCEDURE — 3331090001 HH PPS REVENUE CREDIT

## 2018-01-17 PROCEDURE — 82962 GLUCOSE BLOOD TEST: CPT

## 2018-01-17 PROCEDURE — 85025 COMPLETE CBC W/AUTO DIFF WBC: CPT | Performed by: EMERGENCY MEDICINE

## 2018-01-17 PROCEDURE — 99284 EMERGENCY DEPT VISIT MOD MDM: CPT | Performed by: EMERGENCY MEDICINE

## 2018-01-17 PROCEDURE — 3331090002 HH PPS REVENUE DEBIT

## 2018-01-17 NOTE — DISCHARGE INSTRUCTIONS
Hypoglycemia: Care Instructions  Your Care Instructions    Hypoglycemia means that your blood sugar is low and your body is not getting enough fuel. Some people get low blood sugar from not eating often enough. Some medicines to treat diabetes can cause low blood sugar. People who have had surgery on their stomachs or intestines may get hypoglycemia. Problems with the pancreas, kidneys, or liver also can cause low blood sugar. A snack or drink with sugar in it will raise your blood sugar and should ease your symptoms right away. Your doctor may recommend that you change or stop your medicines until you can get your blood sugar levels under control. In the long run, you may need to change your diet and eating habits so that you get enough fuel for your body throughout the day. Follow-up care is a key part of your treatment and safety. Be sure to make and go to all appointments, and call your doctor if you are having problems. It's also a good idea to know your test results and keep a list of the medicines you take. How can you care for yourself at home? · Learn to recognize the early signs of low blood sugar. Signs include:  ¨ Nausea. ¨ Hunger. ¨ Feeling nervous, irritable, or shaky. ¨ Cold, clammy, wet skin. ¨ Sweating (when you are not exercising). ¨ A fast heartbeat. ¨ Numbness or tingling of the fingertips or lips. · If you feel an episode of low blood sugar coming on, drink fruit juice or sugared (not diet) soda, or eat sugar in the form of candy, cubes, or tablets. [employer] are another [employer]. · Eat small, frequent meals so that you do not get too hungry between meals. · Balance extra exercise with eating more. · Keep a written record of your low blood sugar episodes, including when you last ate and what you ate, so that you can learn what causes your blood sugar to drop.   · Make sure your family, friends, and coworkers know the symptoms of low blood sugar and know what to do to get your sugar level up. · Wear medical alert jewelry that lists your condition. You can buy this at most drugstores. When should you call for help? Call 911 anytime you think you may need emergency care. For example, call if:  ? · You passed out (lost consciousness). ? · You are confused or cannot think clearly. ? · Your blood sugar is very high or very low. ? Watch closely for changes in your health, and be sure to contact your doctor if:  ? · Your blood sugar stays outside the level your doctor set for you. ? · You have any problems. Where can you learn more? Go to http://aditya-roma.info/. Enter U207 in the search box to learn more about \"Hypoglycemia: Care Instructions. \"  Current as of: March 13, 2017  Content Version: 11.4  © 2490-9118 [employer], Inc[employer]. Care instructions adapted under license by [employer] (which disclaims liability or warranty for this information). If you have questions about a medical condition or this instruction, always ask your healthcare professional. [place] [address] any warranty or liability for your use of this information.
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
## 2018-01-17 NOTE — ED TRIAGE NOTES
Per ems report patient to ed from home due to hypoglycemia. Patient initial bgl with ems was 52 and he was sitting up eating a sandwich and drinking juice. Patient bgl upon arrival to ed was 124 with ems giving patient 50ml of d10 enroute.  Patient states his bgl was low this morning and he still took his 42 units of regular insulin before eating breakfast.

## 2018-01-17 NOTE — ED NOTES
I have reviewed discharge instructions with the patient and spouse. The patient and spouse verbalized understanding. Patient left ED via Discharge Method: ambulatory to Home with (wife. Opportunity for questions and clarification provided. Patient given 0 scripts. To continue your aftercare when you leave the hospital, you may receive an automated call from our care team to check in on how you are doing. This is a free service and part of our promise to provide the best care and service to meet your aftercare needs.  If you have questions, or wish to unsubscribe from this service please call [phone]. Thank you for Choosing our [facility] Emergency Department.

## 2018-01-18 ENCOUNTER — HOME CARE VISIT (OUTPATIENT)
Dept: SCHEDULING | Facility: HOME HEALTH | Age: 68
End: 2018-01-18
Payer: MEDICARE

## 2018-01-18 VITALS
SYSTOLIC BLOOD PRESSURE: 129 MMHG | TEMPERATURE: 97.8 F | OXYGEN SATURATION: 96 % | HEART RATE: 56 BPM | DIASTOLIC BLOOD PRESSURE: 70 MMHG | RESPIRATION RATE: 20 BRPM

## 2018-01-18 PROCEDURE — G0299 HHS/HOSPICE OF RN EA 15 MIN: HCPCS

## 2018-01-18 PROCEDURE — 3331090002 HH PPS REVENUE DEBIT

## 2018-01-18 PROCEDURE — 3331090001 HH PPS REVENUE CREDIT

## 2018-01-19 PROCEDURE — 3331090001 HH PPS REVENUE CREDIT

## 2018-01-19 PROCEDURE — 3331090002 HH PPS REVENUE DEBIT

## 2018-01-20 PROCEDURE — 3331090001 HH PPS REVENUE CREDIT

## 2018-01-20 PROCEDURE — 3331090002 HH PPS REVENUE DEBIT

## 2018-01-21 PROCEDURE — 3331090001 HH PPS REVENUE CREDIT

## 2018-01-21 PROCEDURE — 3331090002 HH PPS REVENUE DEBIT

## 2018-01-22 ENCOUNTER — HOME CARE VISIT (OUTPATIENT)
Dept: SCHEDULING | Facility: HOME HEALTH | Age: 68
End: 2018-01-22
Payer: MEDICARE

## 2018-01-22 VITALS
OXYGEN SATURATION: 94 % | HEART RATE: 60 BPM | SYSTOLIC BLOOD PRESSURE: 127 MMHG | TEMPERATURE: 98 F | RESPIRATION RATE: 18 BRPM | DIASTOLIC BLOOD PRESSURE: 79 MMHG

## 2018-01-22 PROCEDURE — 3331090001 HH PPS REVENUE CREDIT

## 2018-01-22 PROCEDURE — G0299 HHS/HOSPICE OF RN EA 15 MIN: HCPCS

## 2018-01-22 PROCEDURE — 3331090002 HH PPS REVENUE DEBIT

## 2018-01-23 PROCEDURE — 77030035128 HC DRSG ANTIMIC FOAM HYDROFERA HOLL -A

## 2018-01-23 PROCEDURE — 3331090002 HH PPS REVENUE DEBIT

## 2018-01-23 PROCEDURE — 3331090001 HH PPS REVENUE CREDIT

## 2018-01-23 PROCEDURE — 11042 DBRDMT SUBQ TIS 1ST 20SQCM/<: CPT

## 2018-01-24 PROCEDURE — 3331090001 HH PPS REVENUE CREDIT

## 2018-01-24 PROCEDURE — 3331090002 HH PPS REVENUE DEBIT

## 2018-01-25 ENCOUNTER — HOME CARE VISIT (OUTPATIENT)
Dept: SCHEDULING | Facility: HOME HEALTH | Age: 68
End: 2018-01-25
Payer: MEDICARE

## 2018-01-25 VITALS
DIASTOLIC BLOOD PRESSURE: 75 MMHG | TEMPERATURE: 98.1 F | RESPIRATION RATE: 16 BRPM | SYSTOLIC BLOOD PRESSURE: 122 MMHG | HEART RATE: 56 BPM | OXYGEN SATURATION: 96 %

## 2018-01-25 PROCEDURE — G0299 HHS/HOSPICE OF RN EA 15 MIN: HCPCS

## 2018-01-25 PROCEDURE — 3331090001 HH PPS REVENUE CREDIT

## 2018-01-25 PROCEDURE — 3331090002 HH PPS REVENUE DEBIT

## 2018-01-26 PROCEDURE — 3331090002 HH PPS REVENUE DEBIT

## 2018-01-26 PROCEDURE — 3331090001 HH PPS REVENUE CREDIT

## 2018-01-27 PROCEDURE — 3331090002 HH PPS REVENUE DEBIT

## 2018-01-27 PROCEDURE — 3331090001 HH PPS REVENUE CREDIT

## 2018-01-28 PROCEDURE — 3331090002 HH PPS REVENUE DEBIT

## 2018-01-28 PROCEDURE — 3331090001 HH PPS REVENUE CREDIT

## 2018-01-29 ENCOUNTER — HOME CARE VISIT (OUTPATIENT)
Dept: SCHEDULING | Facility: HOME HEALTH | Age: 68
End: 2018-01-29
Payer: MEDICARE

## 2018-01-29 VITALS
HEART RATE: 56 BPM | OXYGEN SATURATION: 96 % | DIASTOLIC BLOOD PRESSURE: 64 MMHG | SYSTOLIC BLOOD PRESSURE: 106 MMHG | RESPIRATION RATE: 20 BRPM | TEMPERATURE: 98 F

## 2018-01-29 PROCEDURE — G0299 HHS/HOSPICE OF RN EA 15 MIN: HCPCS

## 2018-01-29 PROCEDURE — 3331090001 HH PPS REVENUE CREDIT

## 2018-01-29 PROCEDURE — 3331090002 HH PPS REVENUE DEBIT

## 2018-01-30 PROCEDURE — 3331090001 HH PPS REVENUE CREDIT

## 2018-01-30 PROCEDURE — 3331090002 HH PPS REVENUE DEBIT

## 2018-01-31 ENCOUNTER — HOSPITAL ENCOUNTER (OUTPATIENT)
Dept: CT IMAGING | Age: 68
Discharge: HOME | End: 2018-01-31
Attending: SURGERY
Payer: MEDICARE

## 2018-01-31 ENCOUNTER — HOME CARE VISIT (OUTPATIENT)
Dept: SCHEDULING | Facility: HOME HEALTH | Age: 68
End: 2018-01-31
Payer: MEDICARE

## 2018-01-31 VITALS
HEART RATE: 74 BPM | TEMPERATURE: 98.1 F | RESPIRATION RATE: 18 BRPM | DIASTOLIC BLOOD PRESSURE: 80 MMHG | SYSTOLIC BLOOD PRESSURE: 148 MMHG | OXYGEN SATURATION: 95 %

## 2018-01-31 DIAGNOSIS — I70.25 ATHEROSCLEROSIS OF NATIVE ARTERY OF OTHER EXTREMITY WITH ULCERATION (HCC): ICD-10-CM

## 2018-01-31 PROCEDURE — 3331090001 HH PPS REVENUE CREDIT

## 2018-01-31 PROCEDURE — 74011000258 HC RX REV CODE- 258: Performed by: SURGERY

## 2018-01-31 PROCEDURE — 3331090002 HH PPS REVENUE DEBIT

## 2018-01-31 PROCEDURE — 75635 CT ANGIO ABDOMINAL ARTERIES: CPT

## 2018-01-31 PROCEDURE — G0299 HHS/HOSPICE OF RN EA 15 MIN: HCPCS

## 2018-01-31 PROCEDURE — 74011636320 HC RX REV CODE- 636/320: Performed by: SURGERY

## 2018-01-31 RX ORDER — SODIUM CHLORIDE 0.9 % (FLUSH) 0.9 %
10 SYRINGE (ML) INJECTION
Status: COMPLETED | OUTPATIENT
Start: 2018-01-31 | End: 2018-01-31

## 2018-01-31 RX ADMIN — SODIUM CHLORIDE 100 ML: 900 INJECTION, SOLUTION INTRAVENOUS at 10:12

## 2018-01-31 RX ADMIN — IOPAMIDOL 125 ML: 755 INJECTION, SOLUTION INTRAVENOUS at 10:12

## 2018-01-31 RX ADMIN — Medication 10 ML: at 10:12

## 2018-02-01 PROCEDURE — 3331090001 HH PPS REVENUE CREDIT

## 2018-02-01 PROCEDURE — 3331090002 HH PPS REVENUE DEBIT

## 2018-02-02 PROCEDURE — 3331090002 HH PPS REVENUE DEBIT

## 2018-02-02 PROCEDURE — 3331090001 HH PPS REVENUE CREDIT

## 2018-02-03 PROCEDURE — 3331090001 HH PPS REVENUE CREDIT

## 2018-02-03 PROCEDURE — 3331090002 HH PPS REVENUE DEBIT

## 2018-02-04 PROCEDURE — 3331090001 HH PPS REVENUE CREDIT

## 2018-02-04 PROCEDURE — 3331090002 HH PPS REVENUE DEBIT

## 2018-02-05 PROCEDURE — 3331090001 HH PPS REVENUE CREDIT

## 2018-02-05 PROCEDURE — 3331090002 HH PPS REVENUE DEBIT

## 2018-02-06 ENCOUNTER — HOSPITAL ENCOUNTER (OUTPATIENT)
Dept: WOUND CARE | Age: 68
Discharge: HOME | End: 2018-02-06
Attending: SURGERY
Payer: MEDICARE

## 2018-02-06 PROCEDURE — 3331090002 HH PPS REVENUE DEBIT

## 2018-02-06 PROCEDURE — 99213 OFFICE O/P EST LOW 20 MIN: CPT

## 2018-02-06 PROCEDURE — 3331090001 HH PPS REVENUE CREDIT

## 2018-02-06 PROCEDURE — 77030035128 HC DRSG ANTIMIC FOAM HYDROFERA HOLL -A

## 2018-02-07 ENCOUNTER — HOME CARE VISIT (OUTPATIENT)
Dept: SCHEDULING | Facility: HOME HEALTH | Age: 68
End: 2018-02-07
Payer: MEDICARE

## 2018-02-07 VITALS
HEART RATE: 58 BPM | OXYGEN SATURATION: 96 % | DIASTOLIC BLOOD PRESSURE: 82 MMHG | RESPIRATION RATE: 16 BRPM | SYSTOLIC BLOOD PRESSURE: 125 MMHG | TEMPERATURE: 98.6 F

## 2018-02-07 PROCEDURE — 3331090002 HH PPS REVENUE DEBIT

## 2018-02-07 PROCEDURE — G0299 HHS/HOSPICE OF RN EA 15 MIN: HCPCS

## 2018-02-07 PROCEDURE — 3331090001 HH PPS REVENUE CREDIT

## 2018-02-08 PROCEDURE — 3331090001 HH PPS REVENUE CREDIT

## 2018-02-08 PROCEDURE — 3331090002 HH PPS REVENUE DEBIT

## 2018-02-09 ENCOUNTER — HOME CARE VISIT (OUTPATIENT)
Dept: SCHEDULING | Facility: HOME HEALTH | Age: 68
End: 2018-02-09
Payer: MEDICARE

## 2018-02-09 PROCEDURE — 3331090001 HH PPS REVENUE CREDIT

## 2018-02-09 PROCEDURE — G0299 HHS/HOSPICE OF RN EA 15 MIN: HCPCS

## 2018-02-09 PROCEDURE — 3331090002 HH PPS REVENUE DEBIT

## 2018-02-10 PROCEDURE — 3331090002 HH PPS REVENUE DEBIT

## 2018-02-10 PROCEDURE — 3331090001 HH PPS REVENUE CREDIT

## 2018-02-11 VITALS
HEART RATE: 72 BPM | SYSTOLIC BLOOD PRESSURE: 132 MMHG | DIASTOLIC BLOOD PRESSURE: 72 MMHG | OXYGEN SATURATION: 97 % | RESPIRATION RATE: 17 BRPM | TEMPERATURE: 97.5 F

## 2018-02-11 PROCEDURE — 3331090001 HH PPS REVENUE CREDIT

## 2018-02-11 PROCEDURE — 3331090002 HH PPS REVENUE DEBIT

## 2018-02-12 PROCEDURE — 3331090001 HH PPS REVENUE CREDIT

## 2018-02-12 PROCEDURE — 3331090002 HH PPS REVENUE DEBIT

## 2018-02-13 ENCOUNTER — HOME CARE VISIT (OUTPATIENT)
Dept: SCHEDULING | Facility: HOME HEALTH | Age: 68
End: 2018-02-13
Payer: MEDICARE

## 2018-02-13 PROCEDURE — 3331090002 HH PPS REVENUE DEBIT

## 2018-02-13 PROCEDURE — 3331090001 HH PPS REVENUE CREDIT

## 2018-02-13 PROCEDURE — G0299 HHS/HOSPICE OF RN EA 15 MIN: HCPCS

## 2018-02-14 PROCEDURE — 3331090001 HH PPS REVENUE CREDIT

## 2018-02-14 PROCEDURE — 3331090002 HH PPS REVENUE DEBIT

## 2018-02-15 ENCOUNTER — HOME CARE VISIT (OUTPATIENT)
Dept: SCHEDULING | Facility: HOME HEALTH | Age: 68
End: 2018-02-15
Payer: MEDICARE

## 2018-02-15 PROCEDURE — 3331090002 HH PPS REVENUE DEBIT

## 2018-02-15 PROCEDURE — G0299 HHS/HOSPICE OF RN EA 15 MIN: HCPCS

## 2018-02-15 PROCEDURE — 3331090001 HH PPS REVENUE CREDIT

## 2018-02-16 VITALS
DIASTOLIC BLOOD PRESSURE: 78 MMHG | OXYGEN SATURATION: 99 % | HEART RATE: 62 BPM | TEMPERATURE: 98.2 F | SYSTOLIC BLOOD PRESSURE: 132 MMHG | RESPIRATION RATE: 16 BRPM

## 2018-02-16 PROCEDURE — 3331090002 HH PPS REVENUE DEBIT

## 2018-02-16 PROCEDURE — 3331090001 HH PPS REVENUE CREDIT

## 2018-02-17 PROCEDURE — 3331090002 HH PPS REVENUE DEBIT

## 2018-02-17 PROCEDURE — 3331090001 HH PPS REVENUE CREDIT

## 2018-02-18 VITALS
OXYGEN SATURATION: 95 % | HEART RATE: 72 BPM | TEMPERATURE: 97.9 F | RESPIRATION RATE: 18 BRPM | SYSTOLIC BLOOD PRESSURE: 134 MMHG | DIASTOLIC BLOOD PRESSURE: 72 MMHG

## 2018-02-18 PROCEDURE — 3331090002 HH PPS REVENUE DEBIT

## 2018-02-18 PROCEDURE — 3331090001 HH PPS REVENUE CREDIT

## 2018-02-19 PROCEDURE — 3331090002 HH PPS REVENUE DEBIT

## 2018-02-19 PROCEDURE — 3331090001 HH PPS REVENUE CREDIT

## 2018-02-20 ENCOUNTER — HOSPITAL ENCOUNTER (OUTPATIENT)
Dept: WOUND CARE | Age: 68
Discharge: HOME | End: 2018-02-20
Attending: SURGERY
Payer: MEDICARE

## 2018-02-20 PROCEDURE — 3331090002 HH PPS REVENUE DEBIT

## 2018-02-20 PROCEDURE — 99214 OFFICE O/P EST MOD 30 MIN: CPT

## 2018-02-20 PROCEDURE — 3331090001 HH PPS REVENUE CREDIT

## 2018-02-21 ENCOUNTER — HOME CARE VISIT (OUTPATIENT)
Dept: SCHEDULING | Facility: HOME HEALTH | Age: 68
End: 2018-02-21
Payer: MEDICARE

## 2018-02-21 PROCEDURE — 3331090002 HH PPS REVENUE DEBIT

## 2018-02-21 PROCEDURE — G0299 HHS/HOSPICE OF RN EA 15 MIN: HCPCS

## 2018-02-21 PROCEDURE — 3331090001 HH PPS REVENUE CREDIT

## 2018-02-22 VITALS
OXYGEN SATURATION: 97 % | SYSTOLIC BLOOD PRESSURE: 148 MMHG | RESPIRATION RATE: 16 BRPM | DIASTOLIC BLOOD PRESSURE: 86 MMHG | HEART RATE: 66 BPM | TEMPERATURE: 98.7 F

## 2018-02-22 PROCEDURE — 3331090001 HH PPS REVENUE CREDIT

## 2018-02-22 PROCEDURE — 3331090002 HH PPS REVENUE DEBIT

## 2018-02-23 PROCEDURE — 3331090001 HH PPS REVENUE CREDIT

## 2018-02-23 PROCEDURE — 3331090002 HH PPS REVENUE DEBIT

## 2018-02-24 PROCEDURE — 3331090001 HH PPS REVENUE CREDIT

## 2018-02-24 PROCEDURE — 3331090002 HH PPS REVENUE DEBIT

## 2018-02-25 PROCEDURE — 3331090002 HH PPS REVENUE DEBIT

## 2018-02-25 PROCEDURE — 3331090001 HH PPS REVENUE CREDIT

## 2018-02-26 PROCEDURE — 3331090001 HH PPS REVENUE CREDIT

## 2018-02-26 PROCEDURE — 3331090002 HH PPS REVENUE DEBIT

## 2018-02-27 PROCEDURE — 3331090002 HH PPS REVENUE DEBIT

## 2018-02-27 PROCEDURE — 3331090001 HH PPS REVENUE CREDIT

## 2018-02-28 PROCEDURE — 3331090001 HH PPS REVENUE CREDIT

## 2018-02-28 PROCEDURE — 3331090002 HH PPS REVENUE DEBIT

## 2018-03-01 PROCEDURE — 3331090002 HH PPS REVENUE DEBIT

## 2018-03-01 PROCEDURE — 3331090001 HH PPS REVENUE CREDIT

## 2018-03-02 ENCOUNTER — HOME CARE VISIT (OUTPATIENT)
Dept: SCHEDULING | Facility: HOME HEALTH | Age: 68
End: 2018-03-02
Payer: MEDICARE

## 2018-03-02 PROCEDURE — G0299 HHS/HOSPICE OF RN EA 15 MIN: HCPCS

## 2018-03-02 PROCEDURE — 3331090001 HH PPS REVENUE CREDIT

## 2018-03-02 PROCEDURE — 3331090002 HH PPS REVENUE DEBIT

## 2018-03-03 PROCEDURE — 3331090001 HH PPS REVENUE CREDIT

## 2018-03-03 PROCEDURE — 3331090002 HH PPS REVENUE DEBIT

## 2018-03-04 PROCEDURE — 3331090002 HH PPS REVENUE DEBIT

## 2018-03-04 PROCEDURE — 3331090001 HH PPS REVENUE CREDIT

## 2018-03-05 PROCEDURE — 3331090002 HH PPS REVENUE DEBIT

## 2018-03-05 PROCEDURE — 3331090001 HH PPS REVENUE CREDIT

## 2018-03-06 ENCOUNTER — HOSPITAL ENCOUNTER (OUTPATIENT)
Dept: WOUND CARE | Age: 68
Discharge: HOME | End: 2018-03-06
Attending: SURGERY
Payer: MEDICARE

## 2018-03-06 PROCEDURE — 99213 OFFICE O/P EST LOW 20 MIN: CPT

## 2018-03-06 PROCEDURE — 77030035128 HC DRSG ANTIMIC FOAM HYDROFERA HOLL -A

## 2018-03-06 PROCEDURE — A6021 COLLAGEN DRESSING <=16 SQ IN: HCPCS

## 2018-03-06 PROCEDURE — 3331090001 HH PPS REVENUE CREDIT

## 2018-03-06 PROCEDURE — 3331090002 HH PPS REVENUE DEBIT

## 2018-03-07 ENCOUNTER — HOME CARE VISIT (OUTPATIENT)
Dept: SCHEDULING | Facility: HOME HEALTH | Age: 68
End: 2018-03-07
Payer: MEDICARE

## 2018-03-07 VITALS
RESPIRATION RATE: 18 BRPM | SYSTOLIC BLOOD PRESSURE: 132 MMHG | HEART RATE: 68 BPM | TEMPERATURE: 97.8 F | OXYGEN SATURATION: 98 % | DIASTOLIC BLOOD PRESSURE: 78 MMHG

## 2018-03-07 PROCEDURE — G0299 HHS/HOSPICE OF RN EA 15 MIN: HCPCS

## 2018-03-07 PROCEDURE — 400014 HH F/U

## 2018-03-07 PROCEDURE — 3331090002 HH PPS REVENUE DEBIT

## 2018-03-07 PROCEDURE — 3331090001 HH PPS REVENUE CREDIT

## 2018-03-08 VITALS
OXYGEN SATURATION: 98 % | RESPIRATION RATE: 16 BRPM | SYSTOLIC BLOOD PRESSURE: 132 MMHG | HEART RATE: 68 BPM | DIASTOLIC BLOOD PRESSURE: 76 MMHG | TEMPERATURE: 98.2 F

## 2018-03-08 PROCEDURE — 3331090002 HH PPS REVENUE DEBIT

## 2018-03-08 PROCEDURE — 3331090001 HH PPS REVENUE CREDIT

## 2018-03-09 ENCOUNTER — HOME CARE VISIT (OUTPATIENT)
Dept: SCHEDULING | Facility: HOME HEALTH | Age: 68
End: 2018-03-09
Payer: MEDICARE

## 2018-03-09 PROCEDURE — 3331090001 HH PPS REVENUE CREDIT

## 2018-03-09 PROCEDURE — G0299 HHS/HOSPICE OF RN EA 15 MIN: HCPCS

## 2018-03-09 PROCEDURE — 3331090002 HH PPS REVENUE DEBIT

## 2018-03-10 PROCEDURE — 3331090002 HH PPS REVENUE DEBIT

## 2018-03-10 PROCEDURE — 3331090001 HH PPS REVENUE CREDIT

## 2018-03-11 VITALS
RESPIRATION RATE: 20 BRPM | SYSTOLIC BLOOD PRESSURE: 105 MMHG | OXYGEN SATURATION: 98 % | DIASTOLIC BLOOD PRESSURE: 70 MMHG | HEART RATE: 56 BPM | TEMPERATURE: 98.1 F

## 2018-03-11 PROCEDURE — 3331090002 HH PPS REVENUE DEBIT

## 2018-03-11 PROCEDURE — 3331090001 HH PPS REVENUE CREDIT

## 2018-03-12 PROCEDURE — 3331090002 HH PPS REVENUE DEBIT

## 2018-03-12 PROCEDURE — 3331090001 HH PPS REVENUE CREDIT

## 2018-03-13 ENCOUNTER — HOME CARE VISIT (OUTPATIENT)
Dept: SCHEDULING | Facility: HOME HEALTH | Age: 68
End: 2018-03-13
Payer: MEDICARE

## 2018-03-13 VITALS
HEART RATE: 58 BPM | SYSTOLIC BLOOD PRESSURE: 112 MMHG | DIASTOLIC BLOOD PRESSURE: 66 MMHG | OXYGEN SATURATION: 95 % | RESPIRATION RATE: 18 BRPM | TEMPERATURE: 98.7 F

## 2018-03-13 PROCEDURE — G0299 HHS/HOSPICE OF RN EA 15 MIN: HCPCS

## 2018-03-13 PROCEDURE — 3331090001 HH PPS REVENUE CREDIT

## 2018-03-13 PROCEDURE — 3331090002 HH PPS REVENUE DEBIT

## 2018-03-14 PROCEDURE — 3331090002 HH PPS REVENUE DEBIT

## 2018-03-14 PROCEDURE — 3331090001 HH PPS REVENUE CREDIT

## 2018-03-15 PROCEDURE — 3331090002 HH PPS REVENUE DEBIT

## 2018-03-15 PROCEDURE — 3331090001 HH PPS REVENUE CREDIT

## 2018-03-16 ENCOUNTER — HOME CARE VISIT (OUTPATIENT)
Dept: SCHEDULING | Facility: HOME HEALTH | Age: 68
End: 2018-03-16
Payer: MEDICARE

## 2018-03-16 VITALS
TEMPERATURE: 97.9 F | HEART RATE: 58 BPM | DIASTOLIC BLOOD PRESSURE: 65 MMHG | RESPIRATION RATE: 20 BRPM | SYSTOLIC BLOOD PRESSURE: 99 MMHG | OXYGEN SATURATION: 97 %

## 2018-03-16 PROCEDURE — 3331090001 HH PPS REVENUE CREDIT

## 2018-03-16 PROCEDURE — 3331090002 HH PPS REVENUE DEBIT

## 2018-03-16 PROCEDURE — G0299 HHS/HOSPICE OF RN EA 15 MIN: HCPCS

## 2018-03-17 PROCEDURE — 3331090002 HH PPS REVENUE DEBIT

## 2018-03-17 PROCEDURE — 3331090001 HH PPS REVENUE CREDIT

## 2018-03-18 PROCEDURE — 3331090001 HH PPS REVENUE CREDIT

## 2018-03-18 PROCEDURE — 3331090002 HH PPS REVENUE DEBIT

## 2018-03-19 PROCEDURE — 3331090002 HH PPS REVENUE DEBIT

## 2018-03-19 PROCEDURE — 3331090001 HH PPS REVENUE CREDIT

## 2018-03-20 ENCOUNTER — HOSPITAL ENCOUNTER (OUTPATIENT)
Dept: WOUND CARE | Age: 68
Discharge: HOME | End: 2018-03-20
Attending: SURGERY
Payer: MEDICARE

## 2018-03-20 PROCEDURE — 3331090001 HH PPS REVENUE CREDIT

## 2018-03-20 PROCEDURE — 3331090002 HH PPS REVENUE DEBIT

## 2018-03-20 PROCEDURE — 99213 OFFICE O/P EST LOW 20 MIN: CPT

## 2018-03-21 ENCOUNTER — HOME CARE VISIT (OUTPATIENT)
Dept: SCHEDULING | Facility: HOME HEALTH | Age: 68
End: 2018-03-21
Payer: MEDICARE

## 2018-03-21 PROCEDURE — 3331090002 HH PPS REVENUE DEBIT

## 2018-03-21 PROCEDURE — G0299 HHS/HOSPICE OF RN EA 15 MIN: HCPCS

## 2018-03-21 PROCEDURE — 3331090001 HH PPS REVENUE CREDIT

## 2018-03-22 PROCEDURE — 3331090001 HH PPS REVENUE CREDIT

## 2018-03-22 PROCEDURE — 3331090002 HH PPS REVENUE DEBIT

## 2018-03-23 ENCOUNTER — HOME CARE VISIT (OUTPATIENT)
Dept: SCHEDULING | Facility: HOME HEALTH | Age: 68
End: 2018-03-23
Payer: MEDICARE

## 2018-03-23 PROCEDURE — G0299 HHS/HOSPICE OF RN EA 15 MIN: HCPCS

## 2018-03-23 PROCEDURE — 3331090001 HH PPS REVENUE CREDIT

## 2018-03-23 PROCEDURE — 3331090002 HH PPS REVENUE DEBIT

## 2018-03-24 PROCEDURE — 3331090002 HH PPS REVENUE DEBIT

## 2018-03-24 PROCEDURE — 3331090001 HH PPS REVENUE CREDIT

## 2018-03-25 PROCEDURE — 3331090001 HH PPS REVENUE CREDIT

## 2018-03-25 PROCEDURE — 3331090002 HH PPS REVENUE DEBIT

## 2018-03-26 VITALS
TEMPERATURE: 97.4 F | DIASTOLIC BLOOD PRESSURE: 61 MMHG | HEART RATE: 50 BPM | OXYGEN SATURATION: 96 % | RESPIRATION RATE: 16 BRPM | SYSTOLIC BLOOD PRESSURE: 117 MMHG

## 2018-03-26 PROCEDURE — 3331090001 HH PPS REVENUE CREDIT

## 2018-03-26 PROCEDURE — 3331090002 HH PPS REVENUE DEBIT

## 2018-03-27 ENCOUNTER — HOME CARE VISIT (OUTPATIENT)
Dept: SCHEDULING | Facility: HOME HEALTH | Age: 68
End: 2018-03-27
Payer: MEDICARE

## 2018-03-27 PROCEDURE — 3331090001 HH PPS REVENUE CREDIT

## 2018-03-27 PROCEDURE — 3331090002 HH PPS REVENUE DEBIT

## 2018-03-28 PROCEDURE — 3331090001 HH PPS REVENUE CREDIT

## 2018-03-28 PROCEDURE — 3331090002 HH PPS REVENUE DEBIT

## 2018-03-29 VITALS
SYSTOLIC BLOOD PRESSURE: 120 MMHG | RESPIRATION RATE: 18 BRPM | OXYGEN SATURATION: 99 % | HEART RATE: 65 BPM | TEMPERATURE: 97.9 F | DIASTOLIC BLOOD PRESSURE: 68 MMHG

## 2018-03-29 PROCEDURE — 3331090001 HH PPS REVENUE CREDIT

## 2018-03-29 PROCEDURE — 3331090002 HH PPS REVENUE DEBIT

## 2018-03-30 ENCOUNTER — HOME CARE VISIT (OUTPATIENT)
Dept: SCHEDULING | Facility: HOME HEALTH | Age: 68
End: 2018-03-30
Payer: MEDICARE

## 2018-03-30 PROCEDURE — G0299 HHS/HOSPICE OF RN EA 15 MIN: HCPCS

## 2018-03-30 PROCEDURE — 3331090001 HH PPS REVENUE CREDIT

## 2018-03-30 PROCEDURE — 3331090002 HH PPS REVENUE DEBIT

## 2018-03-31 PROCEDURE — 3331090002 HH PPS REVENUE DEBIT

## 2018-03-31 PROCEDURE — 3331090001 HH PPS REVENUE CREDIT

## 2018-04-01 PROCEDURE — 3331090001 HH PPS REVENUE CREDIT

## 2018-04-01 PROCEDURE — 3331090002 HH PPS REVENUE DEBIT

## 2018-04-02 PROCEDURE — 3331090002 HH PPS REVENUE DEBIT

## 2018-04-02 PROCEDURE — 3331090001 HH PPS REVENUE CREDIT

## 2018-04-03 ENCOUNTER — HOSPITAL ENCOUNTER (OUTPATIENT)
Dept: WOUND CARE | Age: 68
Discharge: HOME | End: 2018-04-03
Attending: SURGERY
Payer: MEDICARE

## 2018-04-03 ENCOUNTER — HOME CARE VISIT (OUTPATIENT)
Dept: SCHEDULING | Facility: HOME HEALTH | Age: 68
End: 2018-04-03
Payer: MEDICARE

## 2018-04-03 VITALS
TEMPERATURE: 98 F | RESPIRATION RATE: 16 BRPM | OXYGEN SATURATION: 91 % | DIASTOLIC BLOOD PRESSURE: 63 MMHG | SYSTOLIC BLOOD PRESSURE: 110 MMHG | HEART RATE: 51 BPM

## 2018-04-03 PROCEDURE — 3331090001 HH PPS REVENUE CREDIT

## 2018-04-03 PROCEDURE — G0299 HHS/HOSPICE OF RN EA 15 MIN: HCPCS

## 2018-04-03 PROCEDURE — 99213 OFFICE O/P EST LOW 20 MIN: CPT

## 2018-04-03 PROCEDURE — 3331090002 HH PPS REVENUE DEBIT

## 2018-04-04 PROCEDURE — 3331090001 HH PPS REVENUE CREDIT

## 2018-04-04 PROCEDURE — 3331090002 HH PPS REVENUE DEBIT

## 2018-04-05 PROCEDURE — 3331090002 HH PPS REVENUE DEBIT

## 2018-04-05 PROCEDURE — 3331090001 HH PPS REVENUE CREDIT

## 2018-04-06 PROCEDURE — 3331090002 HH PPS REVENUE DEBIT

## 2018-04-06 PROCEDURE — 3331090001 HH PPS REVENUE CREDIT

## 2018-04-07 PROCEDURE — 3331090001 HH PPS REVENUE CREDIT

## 2018-04-07 PROCEDURE — 3331090002 HH PPS REVENUE DEBIT

## 2018-04-08 VITALS
HEART RATE: 72 BPM | RESPIRATION RATE: 17 BRPM | TEMPERATURE: 97.4 F | DIASTOLIC BLOOD PRESSURE: 70 MMHG | OXYGEN SATURATION: 97 % | SYSTOLIC BLOOD PRESSURE: 122 MMHG

## 2018-04-08 PROCEDURE — 3331090002 HH PPS REVENUE DEBIT

## 2018-04-08 PROCEDURE — 3331090001 HH PPS REVENUE CREDIT

## 2018-04-09 ENCOUNTER — HOME CARE VISIT (OUTPATIENT)
Dept: SCHEDULING | Facility: HOME HEALTH | Age: 68
End: 2018-04-09
Payer: MEDICARE

## 2018-04-09 VITALS
DIASTOLIC BLOOD PRESSURE: 60 MMHG | OXYGEN SATURATION: 91 % | SYSTOLIC BLOOD PRESSURE: 104 MMHG | HEART RATE: 58 BPM | RESPIRATION RATE: 18 BRPM | TEMPERATURE: 98 F

## 2018-04-09 PROCEDURE — G0299 HHS/HOSPICE OF RN EA 15 MIN: HCPCS

## 2018-04-09 PROCEDURE — 3331090001 HH PPS REVENUE CREDIT

## 2018-04-09 PROCEDURE — 3331090002 HH PPS REVENUE DEBIT

## 2018-04-10 PROCEDURE — 3331090001 HH PPS REVENUE CREDIT

## 2018-04-10 PROCEDURE — 3331090002 HH PPS REVENUE DEBIT

## 2018-04-11 PROCEDURE — 3331090001 HH PPS REVENUE CREDIT

## 2018-04-11 PROCEDURE — 3331090002 HH PPS REVENUE DEBIT

## 2018-04-12 ENCOUNTER — HOME CARE VISIT (OUTPATIENT)
Dept: SCHEDULING | Facility: HOME HEALTH | Age: 68
End: 2018-04-12
Payer: MEDICARE

## 2018-04-12 VITALS
DIASTOLIC BLOOD PRESSURE: 87 MMHG | HEART RATE: 64 BPM | SYSTOLIC BLOOD PRESSURE: 139 MMHG | OXYGEN SATURATION: 96 % | RESPIRATION RATE: 16 BRPM | TEMPERATURE: 98.7 F

## 2018-04-12 PROCEDURE — 3331090002 HH PPS REVENUE DEBIT

## 2018-04-12 PROCEDURE — 3331090001 HH PPS REVENUE CREDIT

## 2018-04-12 PROCEDURE — G0299 HHS/HOSPICE OF RN EA 15 MIN: HCPCS

## 2018-04-13 PROCEDURE — 3331090001 HH PPS REVENUE CREDIT

## 2018-04-13 PROCEDURE — 3331090002 HH PPS REVENUE DEBIT

## 2018-04-14 PROCEDURE — 3331090002 HH PPS REVENUE DEBIT

## 2018-04-14 PROCEDURE — 3331090001 HH PPS REVENUE CREDIT

## 2018-04-15 PROCEDURE — 3331090001 HH PPS REVENUE CREDIT

## 2018-04-15 PROCEDURE — 3331090002 HH PPS REVENUE DEBIT

## 2018-04-16 ENCOUNTER — HOME CARE VISIT (OUTPATIENT)
Dept: SCHEDULING | Facility: HOME HEALTH | Age: 68
End: 2018-04-16
Payer: MEDICARE

## 2018-04-16 VITALS
RESPIRATION RATE: 18 BRPM | HEART RATE: 56 BPM | SYSTOLIC BLOOD PRESSURE: 128 MMHG | OXYGEN SATURATION: 97 % | TEMPERATURE: 97.9 F | DIASTOLIC BLOOD PRESSURE: 71 MMHG

## 2018-04-16 PROCEDURE — 3331090002 HH PPS REVENUE DEBIT

## 2018-04-16 PROCEDURE — G0299 HHS/HOSPICE OF RN EA 15 MIN: HCPCS

## 2018-04-16 PROCEDURE — 3331090001 HH PPS REVENUE CREDIT

## 2018-04-17 ENCOUNTER — APPOINTMENT (OUTPATIENT)
Dept: GENERAL RADIOLOGY | Age: 68
End: 2018-04-17
Attending: EMERGENCY MEDICINE
Payer: MEDICARE

## 2018-04-17 ENCOUNTER — HOSPITAL ENCOUNTER (OUTPATIENT)
Dept: WOUND CARE | Age: 68
Discharge: HOME | End: 2018-04-17
Attending: SURGERY
Payer: MEDICARE

## 2018-04-17 ENCOUNTER — HOSPITAL ENCOUNTER (EMERGENCY)
Age: 68
Discharge: HOME | End: 2018-04-17
Attending: EMERGENCY MEDICINE
Payer: MEDICARE

## 2018-04-17 VITALS
OXYGEN SATURATION: 94 % | BODY MASS INDEX: 31.97 KG/M2 | RESPIRATION RATE: 18 BRPM | WEIGHT: 236 LBS | TEMPERATURE: 98.7 F | HEART RATE: 50 BPM | DIASTOLIC BLOOD PRESSURE: 70 MMHG | SYSTOLIC BLOOD PRESSURE: 158 MMHG | HEIGHT: 72 IN

## 2018-04-17 DIAGNOSIS — E03.9 HYPOTHYROIDISM, UNSPECIFIED TYPE: ICD-10-CM

## 2018-04-17 DIAGNOSIS — R53.1 GENERALIZED WEAKNESS: Primary | ICD-10-CM

## 2018-04-17 DIAGNOSIS — I95.9 HYPOTENSION, UNSPECIFIED HYPOTENSION TYPE: ICD-10-CM

## 2018-04-17 LAB
ALBUMIN SERPL-MCNC: 3.1 G/DL (ref 3.2–4.6)
ALBUMIN/GLOB SERPL: 0.8 {RATIO} (ref 1.2–3.5)
ALP SERPL-CCNC: 84 U/L (ref 50–136)
ALT SERPL-CCNC: 17 U/L (ref 12–65)
ANION GAP SERPL CALC-SCNC: 10 MMOL/L (ref 7–16)
AST SERPL-CCNC: 10 U/L (ref 15–37)
BASOPHILS # BLD: 0.1 K/UL (ref 0–0.2)
BASOPHILS NFR BLD: 1 % (ref 0–2)
BILIRUB SERPL-MCNC: 0.3 MG/DL (ref 0.2–1.1)
BNP SERPL-MCNC: 74 PG/ML
BUN SERPL-MCNC: 16 MG/DL (ref 8–23)
CALCIUM SERPL-MCNC: 8.8 MG/DL (ref 8.3–10.4)
CHLORIDE SERPL-SCNC: 105 MMOL/L (ref 98–107)
CO2 SERPL-SCNC: 29 MMOL/L (ref 21–32)
CREAT SERPL-MCNC: 1.16 MG/DL (ref 0.8–1.5)
DIFFERENTIAL METHOD BLD: ABNORMAL
EOSINOPHIL # BLD: 0.2 K/UL (ref 0–0.8)
EOSINOPHIL NFR BLD: 3 % (ref 0.5–7.8)
ERYTHROCYTE [DISTWIDTH] IN BLOOD BY AUTOMATED COUNT: 14.5 % (ref 11.9–14.6)
GLOBULIN SER CALC-MCNC: 3.9 G/DL (ref 2.3–3.5)
GLUCOSE SERPL-MCNC: 135 MG/DL (ref 65–100)
HCT VFR BLD AUTO: 37.2 % (ref 41.1–50.3)
HGB BLD-MCNC: 12.7 G/DL (ref 13.6–17.2)
IMM GRANULOCYTES # BLD: 0 K/UL (ref 0–0.5)
IMM GRANULOCYTES NFR BLD AUTO: 0 % (ref 0–5)
LACTATE BLD-SCNC: 1.3 MMOL/L (ref 0.5–1.9)
LYMPHOCYTES # BLD: 2.8 K/UL (ref 0.5–4.6)
LYMPHOCYTES NFR BLD: 43 % (ref 13–44)
MCH RBC QN AUTO: 30.9 PG (ref 26.1–32.9)
MCHC RBC AUTO-ENTMCNC: 34.1 G/DL (ref 31.4–35)
MCV RBC AUTO: 90.5 FL (ref 79.6–97.8)
MONOCYTES # BLD: 0.5 K/UL (ref 0.1–1.3)
MONOCYTES NFR BLD: 7 % (ref 4–12)
NEUTS SEG # BLD: 3.1 K/UL (ref 1.7–8.2)
NEUTS SEG NFR BLD: 46 % (ref 43–78)
PLATELET # BLD AUTO: 189 K/UL (ref 150–450)
PMV BLD AUTO: 11.1 FL (ref 10.8–14.1)
POTASSIUM SERPL-SCNC: 3.7 MMOL/L (ref 3.5–5.1)
PROT SERPL-MCNC: 7 G/DL (ref 6.3–8.2)
RBC # BLD AUTO: 4.11 M/UL (ref 4.23–5.67)
SODIUM SERPL-SCNC: 144 MMOL/L (ref 136–145)
T4 FREE SERPL-MCNC: 1.1 NG/DL (ref 0.78–1.46)
TROPONIN I BLD-MCNC: 0.02 NG/ML (ref 0.02–0.05)
TSH SERPL DL<=0.005 MIU/L-ACNC: 4.13 UIU/ML (ref 0.36–3.74)
WBC # BLD AUTO: 6.7 K/UL (ref 4.3–11.1)

## 2018-04-17 PROCEDURE — 84439 ASSAY OF FREE THYROXINE: CPT | Performed by: EMERGENCY MEDICINE

## 2018-04-17 PROCEDURE — 85025 COMPLETE CBC W/AUTO DIFF WBC: CPT | Performed by: EMERGENCY MEDICINE

## 2018-04-17 PROCEDURE — 80053 COMPREHEN METABOLIC PANEL: CPT | Performed by: EMERGENCY MEDICINE

## 2018-04-17 PROCEDURE — 17250 CHEM CAUT OF GRANLTJ TISSUE: CPT

## 2018-04-17 PROCEDURE — 84484 ASSAY OF TROPONIN QUANT: CPT

## 2018-04-17 PROCEDURE — 71046 X-RAY EXAM CHEST 2 VIEWS: CPT

## 2018-04-17 PROCEDURE — 83880 ASSAY OF NATRIURETIC PEPTIDE: CPT | Performed by: EMERGENCY MEDICINE

## 2018-04-17 PROCEDURE — 3331090002 HH PPS REVENUE DEBIT

## 2018-04-17 PROCEDURE — 3331090001 HH PPS REVENUE CREDIT

## 2018-04-17 PROCEDURE — 93005 ELECTROCARDIOGRAM TRACING: CPT | Performed by: EMERGENCY MEDICINE

## 2018-04-17 PROCEDURE — 84443 ASSAY THYROID STIM HORMONE: CPT | Performed by: EMERGENCY MEDICINE

## 2018-04-17 PROCEDURE — 99213 OFFICE O/P EST LOW 20 MIN: CPT

## 2018-04-17 PROCEDURE — 99285 EMERGENCY DEPT VISIT HI MDM: CPT | Performed by: EMERGENCY MEDICINE

## 2018-04-17 PROCEDURE — 83605 ASSAY OF LACTIC ACID: CPT

## 2018-04-17 NOTE — ED TRIAGE NOTES
Pt arrives from home with [facility], for weakness, has wound on L leg, pt also has hypotension. Pt takes beta blockers, always has bradycardia. Denies n/v/d.

## 2018-04-17 NOTE — ED NOTES
Pt resting in bed, pt denies any additional needs at this time. Pt respirations even and unlabored. Pt bed locked and low, call bell within reach. Vital signs being monitored.     MD at bedside

## 2018-04-18 LAB
ATRIAL RATE: 47 BPM
CALCULATED P AXIS, ECG09: 81 DEGREES
CALCULATED R AXIS, ECG10: 51 DEGREES
CALCULATED T AXIS, ECG11: 69 DEGREES
DIAGNOSIS, 93000: NORMAL
Q-T INTERVAL, ECG07: 514 MS
QRS DURATION, ECG06: 92 MS
QTC CALCULATION (BEZET), ECG08: 449 MS
VENTRICULAR RATE, ECG03: 46 BPM

## 2018-04-18 PROCEDURE — 3331090001 HH PPS REVENUE CREDIT

## 2018-04-18 PROCEDURE — 3331090002 HH PPS REVENUE DEBIT

## 2018-04-18 NOTE — ED NOTES
I have reviewed discharge instructions with the patient. The patient verbalized understanding. Patient left ED via Discharge Method: ambulatory to Home with spouse. Opportunity for questions and clarification provided. Patient given 0 scripts. To continue your aftercare when you leave the hospital, you may receive an automated call from our care team to check in on how you are doing. This is a free service and part of our promise to provide the best care and service to meet your aftercare needs.  If you have questions, or wish to unsubscribe from this service please call [phone]. Thank you for Choosing our [facility] Emergency Department.

## 2018-04-18 NOTE — DISCHARGE INSTRUCTIONS
Fatigue: Care Instructions  Your Care Instructions    Fatigue is a feeling of tiredness, exhaustion, or lack of energy. You may feel fatigue because of too much or not enough activity. It can also come from stress, lack of sleep, boredom, and poor diet. Many medical problems, such as viral infections, can cause fatigue. Emotional problems, especially depression, are often the cause of fatigue. Fatigue is most often a symptom of another problem. Treatment for fatigue depends on the cause. For example, if you have fatigue because you have a certain health problem, treating this problem also treats your fatigue. If depression or anxiety is the cause, treatment may help. Follow-up care is a key part of your treatment and safety. Be sure to make and go to all appointments, and call your doctor if you are having problems. It's also a good idea to know your test results and keep a list of the medicines you take. How can you care for yourself at home? · Get regular exercise. But don't overdo it. Go back and forth between rest and exercise. · Get plenty of rest.  · Eat a healthy diet. Do not skip meals, especially breakfast.  · Reduce your use of caffeine, tobacco, and alcohol. Caffeine is most often found in coffee, tea, cola drinks, and chocolate. · Limit medicines that can cause fatigue. This includes tranquilizers and cold and allergy medicines. When should you call for help? Watch closely for changes in your health, and be sure to contact your doctor if:  ? · You have new symptoms such as fever or a rash. ? · Your fatigue gets worse. ? · You have been feeling down, depressed, or hopeless. Or you may have lost interest in things that you usually enjoy. ? · You are not getting better as expected. Where can you learn more? Go to http://aditya-roma.info/. Enter J605 in the search box to learn more about \"Fatigue: Care Instructions. \"  Current as of: March 20, 2017  Content Version: 11.4  © 4854-9427 [employer]big[employer]. Care instructions adapted under license by [employer] (which disclaims liability or warranty for this information). If you have questions about a medical condition or this instruction, always ask your healthcare professional. [address] any warranty or liability for your use of this information. Low Blood Pressure: Care Instructions  Your Care Instructions    Blood pressure is a measurement of the force of the blood against the walls of the blood vessels during and after each beat of the heart. Low blood pressure is also called hypotension. It means that your blood pressure is much lower than normal. Some people, especially young, slim women, may have slightly low blood pressure without symptoms. But in many people, low blood pressure can cause symptoms such as feeling dizzy or lightheaded. When your blood pressure is too low, your heart, brain, and other organs do not get enough blood. Low blood pressure can be caused by many things, including heart problems and some medicines. Diabetes that is not under control can cause your blood pressure to drop. And so can a severe allergic reaction or infection. Another cause is dehydration, which is when your body loses too much fluid. Treatment for low blood pressure depends on the cause. Follow-up care is a key part of your treatment and safety. Be sure to make and go to all appointments, and call your doctor if you are having problems. It's also a good idea to know your test results and keep a list of the medicines you take. How can you care for yourself at home? · Drink plenty of fluids, enough so that your urine is light yellow or clear like water. If you have kidney, heart, or liver disease and have to limit fluids, talk with your doctor before you increase the amount of fluids you drink. · Be safe with medicines.  Call your doctor if you think you are having a problem with your medicine. You will get more details on the specific medicines your doctor prescribes. · Stand up or get out of bed very slowly to allow your body to adjust.  · Get plenty of rest.  · Do not smoke. Smoking increases your risk of heart attack. If you need help quitting, talk to your doctor about stop-smoking programs and medicines. These can increase your chances of quitting for good. · Limit alcohol to 2 drinks a day for men and 1 drink a day for women. Alcohol may interfere with your medicine. In addition, alcohol can make your low blood pressure worse by causing your body to lose water. When should you call for help? Call 911 anytime you think you may need emergency care. For example, call if:  ? · You passed out (lost consciousness). ?Call your doctor now or seek immediate medical care if:  ? · You are dizzy or lightheaded, or you feel like you may faint. ? Watch closely for changes in your health, and be sure to contact your doctor if you have any problems. Where can you learn more? Go to http://aditya-roma.info/. Enter C304 in the search box to learn more about \"Low Blood Pressure: Care Instructions. \"  Current as of: September 21, 2016  Content Version: 11.4  © 0993-0442 [employer]. Care instructions adapted under license by [employer] (which disclaims liability or warranty for this information). If you have questions about a medical condition or this instruction, always ask your healthcare professional. [place] [address] any warranty or liability for your use of this information. Hypothyroidism: Care Instructions  Your Care Instructions    You have hypothyroidism, which means that your body is not making enough thyroid hormone. This hormone helps your body use energy. If your thyroid level is low, you may feel tired, be constipated, have an increase in your blood pressure, or have dry skin or memory problems.  You may also get cold easily, even when it is warm. Women with low thyroid levels may have heavy menstrual periods. A blood test to find your thyroid-stimulating hormone (TSH) level is used to check for hypothyroidism. A high TSH level may mean that you have low thyroid. When your body is not making enough thyroid hormone, TSH levels rise in an effort to make the body produce more. The treatment for hypothyroidism is to take thyroid hormone pills. You should start to feel better in 1 to 2 weeks. But it can take several months to see changes in the TSH level. You will need regular visits with your doctor to make sure you have the right dose of medicine. Most people need treatment for the rest of their lives. You will need to see your doctor regularly to have blood tests and to make sure you are doing well. Follow-up care is a key part of your treatment and safety. Be sure to make and go to all appointments, and call your doctor if you are having problems. It's also a good idea to know your test results and keep a list of the medicines you take. How can you care for yourself at home? · Take your thyroid hormone medicine exactly as prescribed. Call your doctor if you think you are having a problem with your medicine. Most people do not have side effects if they take the right amount of medicine regularly. ¨ Take the medicine 30 minutes before breakfast, and do not take it with calcium, vitamins, or iron. ¨ Do not take extra doses of your thyroid medicine. It will not help you get better any faster, and it may cause side effects. ¨ If you forget to take a dose, do NOT take a double dose of medicine. Take your usual dose the next day. · Tell your doctor about all prescription, herbal, or over-the-counter products you take. · Take care of yourself. Eat a healthy diet, get enough sleep, and get regular exercise. When should you call for help? Call 911 anytime you think you may need emergency care.  For example, call if:  ? · You passed out (lost consciousness). ? · You have severe trouble breathing. ? · You have a very slow heartbeat (less than 60 beats a minute). ? · You have a low body temperature (95°F or below). ?Call your doctor now or seek immediate medical care if:  ? · You feel tired, sluggish, or weak. ? · You have trouble remembering things or concentrating. ? · You do not begin to feel better 2 weeks after starting your medicine. ? Watch closely for changes in your health, and be sure to contact your doctor if you have any problems. Where can you learn more? Go to http://aditya-roma.info/. Enter L355 in the search box to learn more about \"Hypothyroidism: Care Instructions. \"  Current as of: May 12, 2017  Content Version: 11.4  © 9581-0813 [employer]. Care instructions adapted under license by [employer] (which disclaims liability or warranty for this information). If you have questions about a medical condition or this instruction, always ask your healthcare professional. [place] [address] any warranty or liability for your use of this information. Weakness: Care Instructions  Your Care Instructions    Weakness is a lack of physical or muscle strength. You may feel that you need to make extra effort to move your arms, legs, or other muscles. Generalized weakness means that you feel weak in most areas of your body. Another type of weakness may affect just one muscle or group of muscles. You may feel weak and tired after you have done too much activity, such as taking an extra-long hike. This is not a serious problem. It often goes away on its own. Feeling weak can also be caused by medical conditions like thyroid problems, depression, or a virus. Sometimes the cause can be serious. Your doctor may want to do more tests to try to find the cause of the weakness. The doctor has checked you carefully, but problems can develop later.  If you notice any problems or new symptoms, get medical treatment right away. Follow-up care is a key part of your treatment and safety. Be sure to make and go to all appointments, and call your doctor if you are having problems. It's also a good idea to know your test results and keep a list of the medicines you take. How can you care for yourself at home? · Rest when you feel tired. · Be safe with medicines. If your doctor prescribed medicine, take it exactly as prescribed. Call your doctor if you think you are having a problem with your medicine. You will get more details on the specific medicines your doctor prescribes. · Do not skip meals. Eating a balanced diet may increase your energy level. · Get some physical activity every day, but do not get too tired. When should you call for help? Call your doctor now or seek immediate medical care if:  ? · You have new or worse weakness. ? · You are dizzy or lightheaded, or you feel like you may faint. ? Watch closely for changes in your health, and be sure to contact your doctor if:  ? · You do not get better as expected. Where can you learn more? Go to http://aditya-roma.info/. Enter [id number] [id number] [id number] in the search box to learn more about \"Weakness: Care Instructions. \"  Current as of: March 20, 2017  Content Version: 11.4  © 5004-2271 [employer]wise, Incorporated. Care instructions adapted under license by [employer] (which disclaims liability or warranty for this information). If you have questions about a medical condition or this instruction, always ask your healthcare professional. [place] [address] any warranty or liability for your use of this information.

## 2018-04-19 ENCOUNTER — HOME CARE VISIT (OUTPATIENT)
Dept: SCHEDULING | Facility: HOME HEALTH | Age: 68
End: 2018-04-19
Payer: MEDICARE

## 2018-04-19 VITALS
OXYGEN SATURATION: 97 % | RESPIRATION RATE: 16 BRPM | HEART RATE: 54 BPM | SYSTOLIC BLOOD PRESSURE: 139 MMHG | TEMPERATURE: 98.7 F | DIASTOLIC BLOOD PRESSURE: 69 MMHG

## 2018-04-19 PROCEDURE — 3331090001 HH PPS REVENUE CREDIT

## 2018-04-19 PROCEDURE — G0299 HHS/HOSPICE OF RN EA 15 MIN: HCPCS

## 2018-04-19 PROCEDURE — 3331090002 HH PPS REVENUE DEBIT

## 2018-04-20 PROCEDURE — 3331090001 HH PPS REVENUE CREDIT

## 2018-04-20 PROCEDURE — 3331090002 HH PPS REVENUE DEBIT

## 2018-04-21 PROCEDURE — 3331090002 HH PPS REVENUE DEBIT

## 2018-04-21 PROCEDURE — 3331090001 HH PPS REVENUE CREDIT

## 2018-04-22 PROCEDURE — 3331090001 HH PPS REVENUE CREDIT

## 2018-04-22 PROCEDURE — 3331090002 HH PPS REVENUE DEBIT

## 2018-04-23 ENCOUNTER — HOME CARE VISIT (OUTPATIENT)
Dept: SCHEDULING | Facility: HOME HEALTH | Age: 68
End: 2018-04-23
Payer: MEDICARE

## 2018-04-23 VITALS
DIASTOLIC BLOOD PRESSURE: 69 MMHG | TEMPERATURE: 98 F | SYSTOLIC BLOOD PRESSURE: 115 MMHG | RESPIRATION RATE: 16 BRPM | OXYGEN SATURATION: 97 % | HEART RATE: 61 BPM

## 2018-04-23 PROCEDURE — G0299 HHS/HOSPICE OF RN EA 15 MIN: HCPCS

## 2018-04-23 PROCEDURE — 3331090002 HH PPS REVENUE DEBIT

## 2018-04-23 PROCEDURE — 3331090001 HH PPS REVENUE CREDIT

## 2018-04-24 PROCEDURE — 3331090001 HH PPS REVENUE CREDIT

## 2018-04-24 PROCEDURE — 3331090002 HH PPS REVENUE DEBIT

## 2018-04-25 PROCEDURE — 3331090002 HH PPS REVENUE DEBIT

## 2018-04-25 PROCEDURE — 3331090001 HH PPS REVENUE CREDIT

## 2018-04-26 ENCOUNTER — HOME CARE VISIT (OUTPATIENT)
Dept: SCHEDULING | Facility: HOME HEALTH | Age: 68
End: 2018-04-26
Payer: MEDICARE

## 2018-04-26 VITALS
HEART RATE: 56 BPM | SYSTOLIC BLOOD PRESSURE: 114 MMHG | OXYGEN SATURATION: 93 % | TEMPERATURE: 98.2 F | DIASTOLIC BLOOD PRESSURE: 70 MMHG | RESPIRATION RATE: 18 BRPM

## 2018-04-26 PROCEDURE — G0299 HHS/HOSPICE OF RN EA 15 MIN: HCPCS

## 2018-04-26 PROCEDURE — 3331090001 HH PPS REVENUE CREDIT

## 2018-04-26 PROCEDURE — 3331090002 HH PPS REVENUE DEBIT

## 2018-04-27 PROCEDURE — 3331090002 HH PPS REVENUE DEBIT

## 2018-04-27 PROCEDURE — 3331090001 HH PPS REVENUE CREDIT

## 2018-04-28 PROCEDURE — 3331090001 HH PPS REVENUE CREDIT

## 2018-04-28 PROCEDURE — 3331090002 HH PPS REVENUE DEBIT

## 2018-04-29 PROCEDURE — 3331090001 HH PPS REVENUE CREDIT

## 2018-04-29 PROCEDURE — 3331090002 HH PPS REVENUE DEBIT

## 2018-04-30 PROCEDURE — 3331090001 HH PPS REVENUE CREDIT

## 2018-04-30 PROCEDURE — 3331090002 HH PPS REVENUE DEBIT

## 2018-05-01 ENCOUNTER — HOSPITAL ENCOUNTER (OUTPATIENT)
Dept: WOUND CARE | Age: 68
Discharge: HOME | End: 2018-05-01
Attending: SURGERY
Payer: MEDICARE

## 2018-05-01 PROCEDURE — 99212 OFFICE O/P EST SF 10 MIN: CPT

## 2018-05-01 PROCEDURE — 3331090001 HH PPS REVENUE CREDIT

## 2018-05-01 PROCEDURE — 3331090002 HH PPS REVENUE DEBIT

## 2018-05-02 PROCEDURE — 3331090001 HH PPS REVENUE CREDIT

## 2018-05-02 PROCEDURE — 3331090002 HH PPS REVENUE DEBIT

## 2018-05-03 ENCOUNTER — HOME CARE VISIT (OUTPATIENT)
Dept: SCHEDULING | Facility: HOME HEALTH | Age: 68
End: 2018-05-03
Payer: MEDICARE

## 2018-05-03 VITALS
DIASTOLIC BLOOD PRESSURE: 65 MMHG | TEMPERATURE: 97.9 F | RESPIRATION RATE: 18 BRPM | SYSTOLIC BLOOD PRESSURE: 97 MMHG | OXYGEN SATURATION: 93 % | HEART RATE: 61 BPM

## 2018-05-03 PROCEDURE — G0299 HHS/HOSPICE OF RN EA 15 MIN: HCPCS

## 2018-05-03 PROCEDURE — 3331090001 HH PPS REVENUE CREDIT

## 2018-05-03 PROCEDURE — 3331090002 HH PPS REVENUE DEBIT

## 2018-07-19 ENCOUNTER — HOSPITAL ENCOUNTER (OUTPATIENT)
Dept: LAB | Age: 68
Discharge: HOME | End: 2018-07-19

## 2018-07-19 PROCEDURE — 88312 SPECIAL STAINS GROUP 1: CPT | Performed by: INTERNAL MEDICINE

## 2018-07-19 PROCEDURE — 88305 TISSUE EXAM BY PATHOLOGIST: CPT | Performed by: INTERNAL MEDICINE

## 2018-07-27 PROBLEM — E11.40 TYPE 2 DIABETES MELLITUS WITH DIABETIC NEUROPATHY (HCC): Status: ACTIVE | Noted: 2018-07-27

## 2018-08-10 PROBLEM — F32.1 MODERATE MAJOR DEPRESSION (HCC): Status: ACTIVE | Noted: 2018-08-10

## 2018-11-06 PROBLEM — E11.21 TYPE 2 DIABETES WITH NEPHROPATHY (HCC): Status: ACTIVE | Noted: 2018-11-06

## 2019-01-28 ENCOUNTER — APPOINTMENT (OUTPATIENT)
Dept: GENERAL RADIOLOGY | Age: 69
DRG: 310 | End: 2019-01-28
Payer: MEDICARE

## 2019-01-28 ENCOUNTER — HOSPITAL ENCOUNTER (OUTPATIENT)
Age: 69
Setting detail: OBSERVATION
Discharge: HOME | DRG: 310 | End: 2019-02-01
Attending: EMERGENCY MEDICINE | Admitting: INTERNAL MEDICINE
Payer: MEDICARE

## 2019-01-28 DIAGNOSIS — R00.1 SINUS BRADYCARDIA: Primary | ICD-10-CM

## 2019-01-28 PROBLEM — R53.1 WEAKNESS GENERALIZED: Status: ACTIVE | Noted: 2019-01-28

## 2019-01-28 LAB
ALBUMIN SERPL-MCNC: 3.1 G/DL (ref 3.2–4.6)
ALBUMIN/GLOB SERPL: 0.7 {RATIO} (ref 1.2–3.5)
ALP SERPL-CCNC: 85 U/L (ref 50–136)
ALT SERPL-CCNC: 21 U/L (ref 12–65)
ANION GAP SERPL CALC-SCNC: 6 MMOL/L (ref 7–16)
AST SERPL-CCNC: 15 U/L (ref 15–37)
ATRIAL RATE: 51 BPM
BASOPHILS # BLD: 0.1 K/UL (ref 0–0.2)
BASOPHILS NFR BLD: 1 % (ref 0–2)
BILIRUB SERPL-MCNC: 0.3 MG/DL (ref 0.2–1.1)
BUN SERPL-MCNC: 16 MG/DL (ref 8–23)
CALCIUM SERPL-MCNC: 8.9 MG/DL (ref 8.3–10.4)
CALCULATED R AXIS, ECG10: -3 DEGREES
CALCULATED T AXIS, ECG11: 27 DEGREES
CHLORIDE SERPL-SCNC: 105 MMOL/L (ref 98–107)
CO2 SERPL-SCNC: 30 MMOL/L (ref 21–32)
CREAT SERPL-MCNC: 1.12 MG/DL (ref 0.8–1.5)
DIAGNOSIS, 93000: NORMAL
DIFFERENTIAL METHOD BLD: ABNORMAL
EOSINOPHIL # BLD: 0.3 K/UL (ref 0–0.8)
EOSINOPHIL NFR BLD: 4 % (ref 0.5–7.8)
ERYTHROCYTE [DISTWIDTH] IN BLOOD BY AUTOMATED COUNT: 13.8 % (ref 11.9–14.6)
GLOBULIN SER CALC-MCNC: 4.2 G/DL (ref 2.3–3.5)
GLUCOSE BLD STRIP.AUTO-MCNC: 136 MG/DL (ref 65–100)
GLUCOSE BLD STRIP.AUTO-MCNC: 76 MG/DL (ref 65–100)
GLUCOSE SERPL-MCNC: 73 MG/DL (ref 65–100)
HCT VFR BLD AUTO: 38.8 % (ref 41.1–50.3)
HGB BLD-MCNC: 12.5 G/DL (ref 13.6–17.2)
IMM GRANULOCYTES # BLD AUTO: 0 K/UL (ref 0–0.5)
IMM GRANULOCYTES NFR BLD AUTO: 0 % (ref 0–5)
LYMPHOCYTES # BLD: 2.7 K/UL (ref 0.5–4.6)
LYMPHOCYTES NFR BLD: 43 % (ref 13–44)
MAGNESIUM SERPL-MCNC: 2.1 MG/DL (ref 1.8–2.4)
MCH RBC QN AUTO: 30.8 PG (ref 26.1–32.9)
MCHC RBC AUTO-ENTMCNC: 32.2 G/DL (ref 31.4–35)
MCV RBC AUTO: 95.6 FL (ref 79.6–97.8)
MONOCYTES # BLD: 0.6 K/UL (ref 0.1–1.3)
MONOCYTES NFR BLD: 9 % (ref 4–12)
NEUTS SEG # BLD: 2.7 K/UL (ref 1.7–8.2)
NEUTS SEG NFR BLD: 43 % (ref 43–78)
NRBC # BLD: 0 K/UL (ref 0–0.2)
PLATELET # BLD AUTO: 171 K/UL (ref 150–450)
PMV BLD AUTO: 12 FL (ref 9.4–12.3)
POTASSIUM SERPL-SCNC: 4.1 MMOL/L (ref 3.5–5.1)
PROT SERPL-MCNC: 7.3 G/DL (ref 6.3–8.2)
Q-T INTERVAL, ECG07: 500 MS
QRS DURATION, ECG06: 94 MS
QTC CALCULATION (BEZET), ECG08: 422 MS
RBC # BLD AUTO: 4.06 M/UL (ref 4.23–5.6)
SODIUM SERPL-SCNC: 141 MMOL/L (ref 136–145)
T4 FREE SERPL-MCNC: 1.1 NG/DL (ref 0.78–1.46)
TROPONIN I BLD-MCNC: 0.02 NG/ML (ref 0.02–0.05)
TSH SERPL DL<=0.005 MIU/L-ACNC: 3.74 UIU/ML (ref 0.36–3.74)
VENTRICULAR RATE, ECG03: 43 BPM
WBC # BLD AUTO: 6.3 K/UL (ref 4.3–11.1)

## 2019-01-28 PROCEDURE — 84484 ASSAY OF TROPONIN QUANT: CPT

## 2019-01-28 PROCEDURE — 85025 COMPLETE CBC W/AUTO DIFF WBC: CPT

## 2019-01-28 PROCEDURE — 93005 ELECTROCARDIOGRAM TRACING: CPT

## 2019-01-28 PROCEDURE — 99285 EMERGENCY DEPT VISIT HI MDM: CPT | Performed by: EMERGENCY MEDICINE

## 2019-01-28 PROCEDURE — 84443 ASSAY THYROID STIM HORMONE: CPT

## 2019-01-28 PROCEDURE — 65620000000 HC RM CCU GENERAL

## 2019-01-28 PROCEDURE — 65390000012 HC CONDITION CODE 44 OBSERVATION

## 2019-01-28 PROCEDURE — 74011636637 HC RX REV CODE- 636/637: Performed by: NURSE PRACTITIONER

## 2019-01-28 PROCEDURE — 80053 COMPREHEN METABOLIC PANEL: CPT

## 2019-01-28 PROCEDURE — 71046 X-RAY EXAM CHEST 2 VIEWS: CPT

## 2019-01-28 PROCEDURE — 74011250637 HC RX REV CODE- 250/637: Performed by: NURSE PRACTITIONER

## 2019-01-28 PROCEDURE — 82962 GLUCOSE BLOOD TEST: CPT

## 2019-01-28 PROCEDURE — 96366 THER/PROPH/DIAG IV INF ADDON: CPT

## 2019-01-28 PROCEDURE — 74011000250 HC RX REV CODE- 250: Performed by: NURSE PRACTITIONER

## 2019-01-28 PROCEDURE — 84439 ASSAY OF FREE THYROXINE: CPT

## 2019-01-28 PROCEDURE — 96365 THER/PROPH/DIAG IV INF INIT: CPT

## 2019-01-28 PROCEDURE — 83735 ASSAY OF MAGNESIUM: CPT

## 2019-01-28 RX ORDER — ISOSORBIDE MONONITRATE 60 MG/1
30 TABLET, EXTENDED RELEASE ORAL DAILY
Status: DISCONTINUED | OUTPATIENT
Start: 2019-01-29 | End: 2019-02-01 | Stop reason: HOSPADM

## 2019-01-28 RX ORDER — NITROGLYCERIN 0.4 MG/1
0.4 TABLET SUBLINGUAL AS NEEDED
Status: DISCONTINUED | OUTPATIENT
Start: 2019-01-28 | End: 2019-02-01 | Stop reason: HOSPADM

## 2019-01-28 RX ORDER — MORPHINE SULFATE 15 MG/1
15 TABLET ORAL 2 TIMES DAILY
Status: DISCONTINUED | OUTPATIENT
Start: 2019-01-28 | End: 2019-02-01 | Stop reason: HOSPADM

## 2019-01-28 RX ORDER — HYDRALAZINE HYDROCHLORIDE 50 MG/1
25 TABLET, FILM COATED ORAL 3 TIMES DAILY
Status: DISCONTINUED | OUTPATIENT
Start: 2019-01-28 | End: 2019-02-01 | Stop reason: HOSPADM

## 2019-01-28 RX ORDER — PRAZOSIN HYDROCHLORIDE 1 MG/1
1 CAPSULE ORAL
Status: DISCONTINUED | OUTPATIENT
Start: 2019-01-28 | End: 2019-02-01 | Stop reason: HOSPADM

## 2019-01-28 RX ORDER — GALANTAMINE 4 MG/1
8 TABLET, FILM COATED ORAL 2 TIMES DAILY
Status: DISCONTINUED | OUTPATIENT
Start: 2019-01-28 | End: 2019-02-01 | Stop reason: HOSPADM

## 2019-01-28 RX ORDER — RISPERIDONE 1 MG/1
1 TABLET, FILM COATED ORAL DAILY
Status: DISCONTINUED | OUTPATIENT
Start: 2019-01-29 | End: 2019-01-29

## 2019-01-28 RX ORDER — LANOLIN ALCOHOL/MO/W.PET/CERES
1000 CREAM (GRAM) TOPICAL DAILY
Status: DISCONTINUED | OUTPATIENT
Start: 2019-01-29 | End: 2019-02-01 | Stop reason: HOSPADM

## 2019-01-28 RX ORDER — LEVOTHYROXINE SODIUM 50 UG/1
25 TABLET ORAL
Status: DISCONTINUED | OUTPATIENT
Start: 2019-01-29 | End: 2019-02-01 | Stop reason: HOSPADM

## 2019-01-28 RX ORDER — GABAPENTIN 300 MG/1
600 CAPSULE ORAL 3 TIMES DAILY
Status: DISCONTINUED | OUTPATIENT
Start: 2019-01-28 | End: 2019-02-01 | Stop reason: HOSPADM

## 2019-01-28 RX ORDER — SODIUM CHLORIDE 0.9 % (FLUSH) 0.9 %
5-40 SYRINGE (ML) INJECTION EVERY 8 HOURS
Status: DISCONTINUED | OUTPATIENT
Start: 2019-01-28 | End: 2019-02-01 | Stop reason: HOSPADM

## 2019-01-28 RX ORDER — PANTOPRAZOLE SODIUM 40 MG/1
40 TABLET, DELAYED RELEASE ORAL DAILY
Status: DISCONTINUED | OUTPATIENT
Start: 2019-01-29 | End: 2019-02-01 | Stop reason: HOSPADM

## 2019-01-28 RX ORDER — LOSARTAN POTASSIUM 50 MG/1
100 TABLET ORAL DAILY
Status: DISCONTINUED | OUTPATIENT
Start: 2019-01-29 | End: 2019-01-29

## 2019-01-28 RX ORDER — POTASSIUM CHLORIDE 20 MEQ/1
20 TABLET, EXTENDED RELEASE ORAL DAILY
Status: DISCONTINUED | OUTPATIENT
Start: 2019-01-29 | End: 2019-02-01 | Stop reason: HOSPADM

## 2019-01-28 RX ORDER — AMLODIPINE BESYLATE 5 MG/1
10 TABLET ORAL DAILY
Status: DISCONTINUED | OUTPATIENT
Start: 2019-01-29 | End: 2019-02-01 | Stop reason: HOSPADM

## 2019-01-28 RX ORDER — SODIUM CHLORIDE 0.9 % (FLUSH) 0.9 %
5-40 SYRINGE (ML) INJECTION AS NEEDED
Status: DISCONTINUED | OUTPATIENT
Start: 2019-01-28 | End: 2019-02-01 | Stop reason: HOSPADM

## 2019-01-28 RX ORDER — NITROGLYCERIN 20 MG/100ML
0-200 INJECTION INTRAVENOUS
Status: DISCONTINUED | OUTPATIENT
Start: 2019-01-28 | End: 2019-01-28

## 2019-01-28 RX ORDER — MORPHINE SULFATE 2 MG/ML
2 INJECTION, SOLUTION INTRAMUSCULAR; INTRAVENOUS
Status: DISCONTINUED | OUTPATIENT
Start: 2019-01-28 | End: 2019-02-01 | Stop reason: HOSPADM

## 2019-01-28 RX ORDER — FUROSEMIDE 20 MG/1
20 TABLET ORAL DAILY
Status: DISCONTINUED | OUTPATIENT
Start: 2019-01-29 | End: 2019-01-29

## 2019-01-28 RX ORDER — ESCITALOPRAM OXALATE 10 MG/1
15 TABLET ORAL DAILY
Status: DISCONTINUED | OUTPATIENT
Start: 2019-01-29 | End: 2019-02-01 | Stop reason: HOSPADM

## 2019-01-28 RX ORDER — TAMSULOSIN HYDROCHLORIDE 0.4 MG/1
0.4 CAPSULE ORAL DAILY
Status: DISCONTINUED | OUTPATIENT
Start: 2019-01-29 | End: 2019-02-01 | Stop reason: HOSPADM

## 2019-01-28 RX ORDER — CLOPIDOGREL BISULFATE 75 MG/1
75 TABLET ORAL
Status: DISCONTINUED | OUTPATIENT
Start: 2019-01-29 | End: 2019-02-01 | Stop reason: HOSPADM

## 2019-01-28 RX ORDER — ASPIRIN 81 MG/1
81 TABLET ORAL DAILY
Status: DISCONTINUED | OUTPATIENT
Start: 2019-01-29 | End: 2019-02-01 | Stop reason: HOSPADM

## 2019-01-28 RX ORDER — NITROGLYCERIN 20 MG/100ML
0-200 INJECTION INTRAVENOUS
Status: DISCONTINUED | OUTPATIENT
Start: 2019-01-28 | End: 2019-01-29

## 2019-01-28 RX ADMIN — GABAPENTIN 600 MG: 300 CAPSULE ORAL at 22:05

## 2019-01-28 RX ADMIN — INSULIN HUMAN 15 UNITS: 100 INJECTION, SUSPENSION SUBCUTANEOUS at 21:24

## 2019-01-28 RX ADMIN — Medication 10 ML: at 22:06

## 2019-01-28 RX ADMIN — GALANTAMINE 8 MG: 4 TABLET, FILM COATED ORAL at 19:43

## 2019-01-28 RX ADMIN — PRAZOSIN HYDROCHLORIDE 1 MG: 1 CAPSULE ORAL at 21:10

## 2019-01-28 RX ADMIN — NITROGLYCERIN 10 MCG/MIN: 20 INJECTION INTRAVENOUS at 19:34

## 2019-01-28 RX ADMIN — HYDRALAZINE HYDROCHLORIDE 25 MG: 50 TABLET, FILM COATED ORAL at 21:09

## 2019-01-28 RX ADMIN — MORPHINE SULFATE 15 MG: 15 TABLET ORAL at 21:09
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
## 2019-01-28 NOTE — ROUTINE PROCESS
TRANSFER - OUT REPORT: 
 
Verbal report given to [facility], RN  on Prateek Brown  being transferred to Critical access hospital for routine progression of care Report consisted of patients Situation, Background, Assessment and  
Recommendations(SBAR). Information from the following report(s) SBAR was reviewed with the receiving nurse. Lines:  
Peripheral IV 01/28/19 Right Antecubital (Active) Site Assessment Clean, dry, & intact 1/28/2019  3:13 PM  
Phlebitis Assessment 0 1/28/2019  3:13 PM  
Infiltration Assessment 0 1/28/2019  3:13 PM  
Dressing Status Clean, dry, & intact 1/28/2019  3:13 PM  
Dressing Type Transparent 1/28/2019  3:13 PM  
Hub Color/Line Status Pink 1/28/2019  3:13 PM  
  
 
Opportunity for questions and clarification was provided. Patient transported with: 
 Monitor Registered Nurse

## 2019-01-28 NOTE — ED TRIAGE NOTES
Pt arrives via EMS from [facility]. Pt had fall about a week ago in which he struck his knee and just hasn't felt well. Pt was hypoTN at MD office and in afib without ventricular response on EMS arrival. No IV access established in route and no interventions. Pt states he feels week and dizzy and reports chronic lower back pain. Pt states he had surgery on his cervical spine.
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
## 2019-01-29 ENCOUNTER — APPOINTMENT (OUTPATIENT)
Dept: ULTRASOUND IMAGING | Age: 69
DRG: 310 | End: 2019-01-29
Attending: INTERNAL MEDICINE
Payer: MEDICARE

## 2019-01-29 LAB
ANION GAP SERPL CALC-SCNC: 8 MMOL/L (ref 7–16)
BASOPHILS # BLD: 0 K/UL (ref 0–0.2)
BASOPHILS NFR BLD: 0 % (ref 0–2)
BUN SERPL-MCNC: 12 MG/DL (ref 8–23)
CALCIUM SERPL-MCNC: 8.5 MG/DL (ref 8.3–10.4)
CHLORIDE SERPL-SCNC: 106 MMOL/L (ref 98–107)
CO2 SERPL-SCNC: 28 MMOL/L (ref 21–32)
CREAT SERPL-MCNC: 0.99 MG/DL (ref 0.8–1.5)
DIFFERENTIAL METHOD BLD: ABNORMAL
EOSINOPHIL # BLD: 0.2 K/UL (ref 0–0.8)
EOSINOPHIL NFR BLD: 4 % (ref 0.5–7.8)
ERYTHROCYTE [DISTWIDTH] IN BLOOD BY AUTOMATED COUNT: 13.7 % (ref 11.9–14.6)
GLUCOSE BLD STRIP.AUTO-MCNC: 114 MG/DL (ref 65–100)
GLUCOSE BLD STRIP.AUTO-MCNC: 190 MG/DL (ref 65–100)
GLUCOSE BLD STRIP.AUTO-MCNC: 88 MG/DL (ref 65–100)
GLUCOSE SERPL-MCNC: 154 MG/DL (ref 65–100)
HCT VFR BLD AUTO: 36.8 % (ref 41.1–50.3)
HGB BLD-MCNC: 12 G/DL (ref 13.6–17.2)
IMM GRANULOCYTES # BLD AUTO: 0 K/UL (ref 0–0.5)
IMM GRANULOCYTES NFR BLD AUTO: 0 % (ref 0–5)
LYMPHOCYTES # BLD: 1.8 K/UL (ref 0.5–4.6)
LYMPHOCYTES NFR BLD: 36 % (ref 13–44)
MCH RBC QN AUTO: 30.4 PG (ref 26.1–32.9)
MCHC RBC AUTO-ENTMCNC: 32.6 G/DL (ref 31.4–35)
MCV RBC AUTO: 93.2 FL (ref 79.6–97.8)
MONOCYTES # BLD: 0.5 K/UL (ref 0.1–1.3)
MONOCYTES NFR BLD: 10 % (ref 4–12)
NEUTS SEG # BLD: 2.5 K/UL (ref 1.7–8.2)
NEUTS SEG NFR BLD: 50 % (ref 43–78)
NRBC # BLD: 0 K/UL (ref 0–0.2)
PLATELET # BLD AUTO: 168 K/UL (ref 150–450)
PMV BLD AUTO: 12 FL (ref 9.4–12.3)
POTASSIUM SERPL-SCNC: 4.1 MMOL/L (ref 3.5–5.1)
RBC # BLD AUTO: 3.95 M/UL (ref 4.23–5.6)
SODIUM SERPL-SCNC: 142 MMOL/L (ref 136–145)
WBC # BLD AUTO: 5 K/UL (ref 4.3–11.1)

## 2019-01-29 PROCEDURE — 96375 TX/PRO/DX INJ NEW DRUG ADDON: CPT

## 2019-01-29 PROCEDURE — 74011250637 HC RX REV CODE- 250/637: Performed by: INTERNAL MEDICINE

## 2019-01-29 PROCEDURE — 97162 PT EVAL MOD COMPLEX 30 MIN: CPT

## 2019-01-29 PROCEDURE — 96366 THER/PROPH/DIAG IV INF ADDON: CPT

## 2019-01-29 PROCEDURE — 65390000012 HC CONDITION CODE 44 OBSERVATION

## 2019-01-29 PROCEDURE — 93975 VASCULAR STUDY: CPT

## 2019-01-29 PROCEDURE — 74011250636 HC RX REV CODE- 250/636: Performed by: INTERNAL MEDICINE

## 2019-01-29 PROCEDURE — 74011250637 HC RX REV CODE- 250/637: Performed by: NURSE PRACTITIONER

## 2019-01-29 PROCEDURE — 99218 HC RM OBSERVATION: CPT

## 2019-01-29 PROCEDURE — 97530 THERAPEUTIC ACTIVITIES: CPT

## 2019-01-29 PROCEDURE — 85025 COMPLETE CBC W/AUTO DIFF WBC: CPT

## 2019-01-29 PROCEDURE — 80048 BASIC METABOLIC PNL TOTAL CA: CPT

## 2019-01-29 PROCEDURE — 74011636637 HC RX REV CODE- 636/637: Performed by: NURSE PRACTITIONER

## 2019-01-29 PROCEDURE — 82962 GLUCOSE BLOOD TEST: CPT

## 2019-01-29 PROCEDURE — 36415 COLL VENOUS BLD VENIPUNCTURE: CPT

## 2019-01-29 PROCEDURE — 77030010545

## 2019-01-29 PROCEDURE — 93306 TTE W/DOPPLER COMPLETE: CPT

## 2019-01-29 RX ORDER — RISPERIDONE 1 MG/1
1 TABLET, FILM COATED ORAL
Status: DISCONTINUED | OUTPATIENT
Start: 2019-01-29 | End: 2019-02-01 | Stop reason: HOSPADM

## 2019-01-29 RX ORDER — HYDRALAZINE HYDROCHLORIDE 20 MG/ML
20 INJECTION INTRAMUSCULAR; INTRAVENOUS ONCE
Status: COMPLETED | OUTPATIENT
Start: 2019-01-29 | End: 2019-01-29

## 2019-01-29 RX ADMIN — PRAZOSIN HYDROCHLORIDE 1 MG: 1 CAPSULE ORAL at 21:47

## 2019-01-29 RX ADMIN — GABAPENTIN 600 MG: 300 CAPSULE ORAL at 15:19

## 2019-01-29 RX ADMIN — LEVOTHYROXINE SODIUM 25 MCG: 50 TABLET ORAL at 06:32

## 2019-01-29 RX ADMIN — AMLODIPINE BESYLATE 10 MG: 5 TABLET ORAL at 08:43

## 2019-01-29 RX ADMIN — OLMESARTAN MEDOXOMIL: 40 TABLET, COATED ORAL at 11:03

## 2019-01-29 RX ADMIN — ASPIRIN 81 MG: 81 TABLET, COATED ORAL at 08:13

## 2019-01-29 RX ADMIN — Medication 10 ML: at 15:21

## 2019-01-29 RX ADMIN — GALANTAMINE 8 MG: 4 TABLET, FILM COATED ORAL at 09:35

## 2019-01-29 RX ADMIN — GALANTAMINE 8 MG: 4 TABLET, FILM COATED ORAL at 21:47

## 2019-01-29 RX ADMIN — PANTOPRAZOLE SODIUM 40 MG: 40 TABLET, DELAYED RELEASE ORAL at 08:12

## 2019-01-29 RX ADMIN — HYDRALAZINE HYDROCHLORIDE 20 MG: 20 INJECTION INTRAMUSCULAR; INTRAVENOUS at 09:51

## 2019-01-29 RX ADMIN — MORPHINE SULFATE 15 MG: 15 TABLET ORAL at 08:12

## 2019-01-29 RX ADMIN — LOSARTAN POTASSIUM 100 MG: 50 TABLET ORAL at 08:12

## 2019-01-29 RX ADMIN — MORPHINE SULFATE 15 MG: 15 TABLET ORAL at 21:47

## 2019-01-29 RX ADMIN — ISOSORBIDE MONONITRATE 30 MG: 60 TABLET, EXTENDED RELEASE ORAL at 08:13

## 2019-01-29 RX ADMIN — Medication 5 ML: at 06:00

## 2019-01-29 RX ADMIN — TAMSULOSIN HYDROCHLORIDE 0.4 MG: 0.4 CAPSULE ORAL at 08:13

## 2019-01-29 RX ADMIN — INSULIN HUMAN 15 UNITS: 100 INJECTION, SUSPENSION SUBCUTANEOUS at 08:25

## 2019-01-29 RX ADMIN — HYDRALAZINE HYDROCHLORIDE 25 MG: 50 TABLET, FILM COATED ORAL at 08:13

## 2019-01-29 RX ADMIN — GABAPENTIN 600 MG: 300 CAPSULE ORAL at 08:13

## 2019-01-29 RX ADMIN — CYANOCOBALAMIN TAB 1000 MCG 1000 MCG: 1000 TAB at 08:13

## 2019-01-29 RX ADMIN — INSULIN HUMAN 15 UNITS: 100 INJECTION, SUSPENSION SUBCUTANEOUS at 21:48

## 2019-01-29 RX ADMIN — HYDRALAZINE HYDROCHLORIDE 25 MG: 50 TABLET, FILM COATED ORAL at 21:47

## 2019-01-29 RX ADMIN — ESCITALOPRAM OXALATE 15 MG: 10 TABLET ORAL at 08:12

## 2019-01-29 RX ADMIN — HYDRALAZINE HYDROCHLORIDE 25 MG: 50 TABLET, FILM COATED ORAL at 15:19

## 2019-01-29 RX ADMIN — POTASSIUM CHLORIDE 20 MEQ: 20 TABLET, EXTENDED RELEASE ORAL at 08:13

## 2019-01-29 RX ADMIN — CLOPIDOGREL BISULFATE 75 MG: 75 TABLET ORAL at 08:13

## 2019-01-29 RX ADMIN — RISPERIDONE 1 MG: 1 TABLET ORAL at 21:47

## 2019-01-29 RX ADMIN — Medication 10 ML: at 21:47

## 2019-01-29 RX ADMIN — FUROSEMIDE 20 MG: 20 TABLET ORAL at 08:13

## 2019-01-29 RX ADMIN — GABAPENTIN 600 MG: 300 CAPSULE ORAL at 21:47

## 2019-01-29 NOTE — PROGRESS NOTES
Pt transferred to CVICU this day. No anticipated discharge needs at this time. CM continues to follow.

## 2019-01-29 NOTE — PROGRESS NOTES
TRANSFER - IN REPORT: 
 
Verbal report received from Swapnil RN(name) on Jonnathan Grade  being received from ED(unit) for routine progression of care Report consisted of patients Situation, Background, Assessment and  
Recommendations(SBAR). Information from the following report(s) SBAR, Kardex, ED Summary, Intake/Output, MAR and Cardiac Rhythm SB 44-49 was reviewed with the receiving nurse. Opportunity for questions and clarification was provided. Assessment completed upon patients arrival to unit and care assumed.

## 2019-01-29 NOTE — PROGRESS NOTES
Spiritual Care Visit, initial visit. Visited with patient at bedside. Ultrasound was  In progress,  left a card and invited patient to call when needed. Visit by Ramonita Czar Claudeen Stabile, Staff .  M.Ed., Jose E.B., B.A.

## 2019-01-29 NOTE — PROGRESS NOTES
LATE NOTE: 
Virtual Utilization Review RN [facility] determined this patient meets the Condition Code 44 criteria under the Medicare guidelines for change from Inpatient to observation status. Admission status has been changed in the computer system. A copy of the Utilization Review RN documentation/KIRSTIN Account Notes Report and the MOON letter explaining the outpatient/observation services were given to patient/family representative with verbal explanation and verbal understanding was received about information given. Letter signed by patient with date and time. Signed copy placed in the patient's chart for scanning by HIS and copy left at bedside for patient information.  notified.

## 2019-01-29 NOTE — PROGRESS NOTES
Problem: Mobility Impaired (Adult and Pediatric) Goal: *Acute Goals and Plan of Care (Insert Text) 1. Mr. Hailee Gilmore will perform supine to sit and sit to supine with supervision in 3 days. 2.  Mr. Hailee Gilmore will perform sit to stand and bed to chair with supervision in 3 days. 3.  Mr. Hailee Gilmore will perform gait with rolling walker 150 ft with supervision in 3 days. PHYSICAL THERAPY: Initial Assessment and Daily Note 1/29/2019OBSERVATION: PT Visit Days : 1 Payor: Franchesca Joseph / Plan: [facility] [employer] MEDICARE CHOICE PPO/PFFS / Product Type: [employer] Medicare /   
  
NAME/AGE/GENDER: Dioni Barajas is a 70 y.o. male PRIMARY DIAGNOSIS: Bradycardia Bradycardia Bradycardia Bradycardia ICD-10: Treatment Diagnosis:  
 · Generalized Muscle Weakness (M62.81) · Difficulty in walking, Not elsewhere classified (R26.2) Precaution/Allergies: 
Fosinopril and Lisinopril ASSESSMENT:  
Mr. Hailee Gilmore presents in the chair, wife at his side. He tells me about a fall he had last week. Likely related it the drop in his HR. He says his leg hurts but he has been able to ambulate. He apparently is on pain medicine chronically for LBP. He also is on meds for PTSD. His wife tells me how she needs some help at home. They have limited amount of aide time for assist with bathing. She tells me she has to leave him alone at times to go grocery shopping or appts. She has no family help. She admits that it is wearing on her. Today Mr. Hailee Gilmore was able to perform sit to stand with contact guard. Stood to get cleaned up with supervision. Gait quite slowly but steady with rolling walker about 20 ft. His wife states that this is how he walks at home. Sit to supine with min assist.  Ms. Hailee Gilmore asked about home health PT. This may be beneficial.  Mr. Hailee Gilmore is functioning below baseline and is therefore appropriate for skilled PT to maximize his rehab potential.   
 
This section established at most recent assessment PROBLEM LIST (Impairments causing functional limitations): 1. Decreased Strength 2. Decreased Transfer Abilities 3. Decreased Ambulation Ability/Technique 4. Decreased Activity Tolerance INTERVENTIONS PLANNED: (Benefits and precautions of physical therapy have been discussed with the patient.) 1. Bed Mobility 2. Gait Training 3. Therapeutic Activites 4. Therapeutic Exercise/Strengthening 5. Transfer Training TREATMENT PLAN: Frequency/Duration: 4 times a week for duration of hospital stay Rehabilitation Potential For Stated Goals: Good RECOMMENDED REHABILITATION/EQUIPMENT: (at time of discharge pending progress): Due to the probability of continued deficits (see above) this patient will likely need continued skilled physical therapy after discharge. Equipment:  
? None at this time HISTORY:  
History of Present Injury/Illness (Reason for Referral): 
Patient is a 70 y.o. male with a past medical hx of CAD s/p stents in 2008, Asthma, HTN,  DM, Depression, chronic back and neck pain, seizures, PTSD, TIA x 3, and hypothyroidism. The patient has felt weak for about a week and has had one fall. He has associated symptoms of SOB but denies CP, LEVY, LOC, AMS, N/V, ha, recent illness, being on betablocker's, taking any medications that are not prescribed to him. The patient said he recently had a trip to the ED for weakness and fatigue with a HR in the 50's where he was treated and released from the ED in April. He denies any further episodes and was not on BB at the time of his ED visit. He currently has no other complaints. During his physical exam the patient was very lethartic, had trouble concentrating, and had mild SOB Past Medical History/Comorbidities:  
Mr. Janine Hearn  has a past medical history of Abdominal complaints (12/18/2013), Arthritis, Asthma, BPH (benign prostatic hyperplasia), CAD (coronary artery disease), Chronic pain, COPD, Depression (emotion) (12/18/2013), Diabetes ([address] GERD (gastroesophageal reflux disease), Hypertension, Neuropathy, Neuropathy, Other ill-defined conditions(799.89), Post traumatic stress disorder (1/12/2012), PTSD (post-traumatic stress disorder), Seizures ([address] Stroke ([address] Thrombocytopenia, unspecified ([address] (1/12/2012), and Thyroid disease. Mr. Moi Lockwood  has a past surgical history that includes hx heent (2010); hx other surgical (1967/68); hx orthopaedic; bone marrow aspirate &biopsy (1/19/2012); pr cardiac surg procedure unlist; hx heart catheterization (4/23/2015); hx back surgery; and pr neurological procedure unlisted. Social History/Living Environment:  
Home Environment: Private residence # Steps to Enter: 6 One/Two Story Residence: Two story, live on 1st floor # of Interior Steps: 10 Interior Rails: Both Lift Chair Available: No 
Living Alone: No 
Support Systems: Spouse/Significant Other/Partner Patient Expects to be Discharged to[de-identified] Private residence Current DME Used/Available at Home: Shower chair, Commode, bedside, Walker, rolling Prior Level of Function/Work/Activity: 
Used rolling walker. age, COPD, PTSD Number of Personal Factors/Comorbidities that affect the Plan of Care: 3+: HIGH COMPLEXITY EXAMINATION:  
Most Recent Physical Functioning:  
Gross Assessment: 
AROM: Generally decreased, functional 
PROM: Within functional limits Strength: Generally decreased, functional 
         
  
Posture: 
Posture (WDL): Exceptions to [facility] Posture Assessment: Forward head, Trunk flexion Balance: 
  Bed Mobility: 
Rolling: Supervision Sit to Supine: Contact guard assistance Wheelchair Mobility: 
  
Transfers: 
Sit to Stand: Contact guard assistance Stand to Sit: Contact guard assistance Gait: 
  
Speed/Precious: Slow;Shuffled Step Length: Right shortened;Left shortened Distance (ft): 20 Feet (ft) Assistive Device: Walker, rolling Ambulation - Level of Assistance: Supervision;Contact guard assistance Body Structures Involved: 1. Muscles Body Functions Affected: 1. Movement Related Activities and Participation Affected: 1. Mobility Number of elements that affect the Plan of Care: 3: MODERATE COMPLEXITY CLINICAL PRESENTATION:  
Presentation: Evolving clinical presentation with changing clinical characteristics: MODERATE COMPLEXITY CLINICAL DECISION MAKIN [facility] [address] AM-PAC 6 Clicks Basic Mobility Inpatient Short Form How much difficulty does the patient currently have. .. Unable A Lot A Little None 1. Turning over in bed (including adjusting bedclothes, sheets and blankets)? [] 1   [] 2   [x] 3   [] 4  
2. Sitting down on and standing up from a chair with arms ( e.g., wheelchair, bedside commode, etc.)   [] 1   [] 2   [x] 3   [] 4  
3. Moving from lying on back to sitting on the side of the bed? [] 1   [] 2   [x] 3   [] 4 How much help from another person does the patient currently need. .. Total A Lot A Little None 4. Moving to and from a bed to a chair (including a wheelchair)? [] 1   [] 2   [x] 3   [] 4  
5. Need to walk in hospital room? [] 1   [] 2   [x] 3   [] 4  
6. Climbing 3-5 steps with a railing? [] 1   [] 2   [x] 3   [] 4  
© , Trustees of [address] [place] [address] [address] [address], under license to [employer]. All rights reserved Score:  Initial: 18 Most Recent: X (Date: -- ) Interpretation of Tool:  Represents activities that are increasingly more difficult (i.e. Bed mobility, Transfers, Gait). Medical Necessity:    
· Patient is expected to demonstrate progress in functional technique to increase independence with mobility and gait. . 
Reason for Services/Other Comments: 
· Patient continues to require present interventions due to patient's inability to function at baseline.   
Use of outcome tool(s) and clinical judgement create a POC that gives a: Questionable prediction of patient's progress: MODERATE COMPLEXITY  
  
 
 
 
TREATMENT:  
 (In addition to Assessment/Re-Assessment sessions the following treatments were rendered) Pre-treatment Symptoms/Complaints:  None Pain: Initial:  
Pain Intensity 1: 8 Pain Location 1: Leg 
Pain Orientation 1: Left Pain Intervention(s) 1: Emotional support(RN already aware.  he has been medicated)  Post Session:  none Therapeutic Activity: (    8):  Therapeutic activities including Bed transfers, Chair transfers and Ambulation on level ground to improve mobility. Required minimal   to promote motor control of bilateral, upper extremity(s), lower extremity(s). Braces/Orthotics/Lines/Etc:  
· O2 Device: Room air Treatment/Session Assessment:   
· Response to Treatment:  fair · Interdisciplinary Collaboration:  
o Registered Nurse · After treatment position/precautions:  
o Up in chair 
o Call light within reach 
o RN notified · Compliance with Program/Exercises: Will assess as treatment progresses · Recommendations/Intent for next treatment session: \"Next visit will focus on advancements to more challenging activities and reduction in assistance provided\". Total Treatment Duration: PT Patient Time In/Time Out Time In: 0767 Time Out: 1450 Pedro Edmonds PT

## 2019-01-29 NOTE — PROGRESS NOTES
Bedside and Verbal shift change report given to Bj Ferrara (oncoming nurse) by Gloria Wahl RN (offgoing nurse). Report included the following information SBAR, Kardex, ED Summary, Procedure Summary, Intake/Output, MAR, Recent Results, Med Rec Status, Cardiac Rhythm SR SR PVC 77 and Alarm Parameters .

## 2019-01-29 NOTE — PROGRESS NOTES
TRANSFER - OUT REPORT: 
 
Verbal report given to Elissa Wheatley RN (name) on Stephany Stanton  being transferred to CVICU 105 (unit) for routine progression of care Report consisted of patients Situation, Background, Assessment and  
Recommendations(SBAR). Information from the following report(s) Kardex, Intake/Output, MAR, Recent Results, Cardiac Rhythm NSR/a flutter and Alarm Parameters  was reviewed with the receiving nurse. Lines:  
Peripheral IV 01/28/19 Right Antecubital (Active) Site Assessment Clean, dry, & intact 1/28/2019  7:01 PM  
Phlebitis Assessment 0 1/28/2019  7:01 PM  
Infiltration Assessment 0 1/28/2019  7:01 PM  
Dressing Status Clean, dry, & intact 1/28/2019  7:01 PM  
Dressing Type Transparent;Tape 1/28/2019  7:01 PM  
Hub Color/Line Status Pink;Patent; Flushed 1/28/2019  7:01 PM  
Alcohol Cap Used No 1/28/2019  7:01 PM  
  
 
Opportunity for questions and clarification was provided. Patient transported with: 
 Monitor Patient's wife notified of lateral transfer

## 2019-01-29 NOTE — PROGRESS NOTES
TRANSFER - IN REPORT: 
 
Verbal report received from [address] [place] [address],[address], RN(name) on [place] [address]  being received from CCU(unit) for routine progression of care Report consisted of patients Situation, Background, Assessment and  
Recommendations(SBAR). Information from the following report(s) SBAR, Kardex and Cardiac Rhythm atrial flutter was reviewed with the receiving nurse. Opportunity for questions and clarification was provided. Assessment completed upon patients arrival to unit and care assumed.

## 2019-01-29 NOTE — PROGRESS NOTES
[facility] CARDIOLOGY PROGRESS NOTE 
      
 
1/29/2019 9:29 AM 
 
Admit Date: 1/28/2019 Subjective: No significant bardycardia. Appears to have vasodepressor symptoms. He is fatigued with SBP < 130mmHg. ROS: 
Cardiovascular:  As noted above Objective:  
  
Vitals:  
 01/29/19 8558 01/29/19 4047 01/29/19 1105 01/29/19 0901 BP: (!) 201/86 128/56 185/79 169/72 Pulse: 66 63 (!) 52 (!) 57 Resp: 10 10 10 16 Temp: 98.1 °F (36.7 °C) SpO2:   98% 95% Weight:      
Height:      
 
 
Physical Exam: 
General-No Acute Distress Neck- supple, no JVD 
CV- regular rate and rhythm no MRG Lung- clear bilaterally Abd- soft, nontender, nondistended Ext- no edema bilaterally. Skin- warm and dry Data Review:  
Recent Labs  
  01/28/19 
1513   
K 4.1 MG 2.1 BUN 16  
CREA 1.12  
GLU 73 WBC 6.3 HGB 12.5*  
HCT 38.8*  
 Assessment/Plan:  
 
Principal Problem: 
  Bradycardia (1/28/2019) No sustained bradycardia. Transfer to floor. May need ILR prior to DC Active Problems: Hypertension (1/12/2012) This is labile and appears to have vasodepressor symptoms and severe HTN. DC losartan and change to benicar/HCTZ. DC lasix. Increase hydralazine. Check renal artery duplex and echo. Try to not over treat BP. Type 2 diabetes mellitus with diabetic polyneuropathy ([facility]) (7/13/2015) Medical therapy Weakness generalized (1/28/2019) Get PT and SW involved. Maybe due to vasodepressor events.    
 
 
 
 
Aaron Strong MD 
1/29/2019 9:29 AM

## 2019-01-29 NOTE — PHYSICIAN ADVISORY
Letter of Determination:  Outpatient status receiving Observation Services This patient was originally hospitalized as Inpatient Status on 1/28/2019 for bradycardia. At this time this patient does not appear to meet the medical necessity requirements to support an inpatient level of care. It is our recommendation that this patient's hospitalization status should be changed from INPATIENT to [place] receiving OBSERVATION services via Condition Code 44.  
  
This may change due to the medical condition of the patient and new clinical evidence as the patients care progreses. The final decision regarding the patient's hospitalization status depends on the attending physician's judgement. Jose Martin Alejandro MD, ARMAND, Physician Advisor [address] [place] [address].

## 2019-01-29 NOTE — PROGRESS NOTES
Dual skin assessment completed with [facility] Karma Nowak pulled back to inspect skin benign no signs of breakdown noted. Pt has abrasion on right leg below knee and scar on right ankle.

## 2019-01-29 NOTE — PROGRESS NOTES
Received alert male to room 3308 from ED. Dual skin assessment done with Milli Rogers. Noted abrasions to right knee. Small ulcer noted to 5th toe of right foot. Large scar noted to RLE. Pitting edema noted to bilateral LE. allevyn applied to sacral area no breakdown noted.

## 2019-01-30 LAB
ANION GAP SERPL CALC-SCNC: 8 MMOL/L (ref 7–16)
APPEARANCE UR: CLEAR
APTT PPP: 67.4 SEC (ref 24.7–39.8)
BASOPHILS # BLD: 0 K/UL (ref 0–0.2)
BASOPHILS NFR BLD: 1 % (ref 0–2)
BILIRUB UR QL: NEGATIVE
BUN SERPL-MCNC: 10 MG/DL (ref 8–23)
CALCIUM SERPL-MCNC: 8.4 MG/DL (ref 8.3–10.4)
CHLORIDE SERPL-SCNC: 103 MMOL/L (ref 98–107)
CO2 SERPL-SCNC: 30 MMOL/L (ref 21–32)
COLOR UR: YELLOW
CREAT SERPL-MCNC: 1.01 MG/DL (ref 0.8–1.5)
DIFFERENTIAL METHOD BLD: ABNORMAL
EOSINOPHIL # BLD: 0.2 K/UL (ref 0–0.8)
EOSINOPHIL NFR BLD: 4 % (ref 0.5–7.8)
ERYTHROCYTE [DISTWIDTH] IN BLOOD BY AUTOMATED COUNT: 13.7 % (ref 11.9–14.6)
GLUCOSE BLD STRIP.AUTO-MCNC: 171 MG/DL (ref 65–100)
GLUCOSE BLD STRIP.AUTO-MCNC: 254 MG/DL (ref 65–100)
GLUCOSE SERPL-MCNC: 126 MG/DL (ref 65–100)
GLUCOSE UR STRIP.AUTO-MCNC: NEGATIVE MG/DL
HCT VFR BLD AUTO: 36.9 % (ref 41.1–50.3)
HGB BLD-MCNC: 12.1 G/DL (ref 13.6–17.2)
HGB UR QL STRIP: NEGATIVE
IMM GRANULOCYTES # BLD AUTO: 0 K/UL (ref 0–0.5)
IMM GRANULOCYTES NFR BLD AUTO: 0 % (ref 0–5)
KETONES UR QL STRIP.AUTO: NEGATIVE MG/DL
LEUKOCYTE ESTERASE UR QL STRIP.AUTO: NEGATIVE
LYMPHOCYTES # BLD: 2.1 K/UL (ref 0.5–4.6)
LYMPHOCYTES NFR BLD: 44 % (ref 13–44)
MAGNESIUM SERPL-MCNC: 2 MG/DL (ref 1.8–2.4)
MCH RBC QN AUTO: 30 PG (ref 26.1–32.9)
MCHC RBC AUTO-ENTMCNC: 32.8 G/DL (ref 31.4–35)
MCV RBC AUTO: 91.6 FL (ref 79.6–97.8)
MONOCYTES # BLD: 0.4 K/UL (ref 0.1–1.3)
MONOCYTES NFR BLD: 9 % (ref 4–12)
NEUTS SEG # BLD: 2.1 K/UL (ref 1.7–8.2)
NEUTS SEG NFR BLD: 43 % (ref 43–78)
NITRITE UR QL STRIP.AUTO: NEGATIVE
NRBC # BLD: 0 K/UL (ref 0–0.2)
PH UR STRIP: 7 [PH] (ref 5–9)
PLATELET # BLD AUTO: 180 K/UL (ref 150–450)
PMV BLD AUTO: 12 FL (ref 9.4–12.3)
POTASSIUM SERPL-SCNC: 3.7 MMOL/L (ref 3.5–5.1)
PROT UR STRIP-MCNC: NEGATIVE MG/DL
RBC # BLD AUTO: 4.03 M/UL (ref 4.23–5.6)
SODIUM SERPL-SCNC: 141 MMOL/L (ref 136–145)
SP GR UR REFRACTOMETRY: 1.02 (ref 1–1.02)
UROBILINOGEN UR QL STRIP.AUTO: 0.2 EU/DL (ref 0.2–1)
WBC # BLD AUTO: 4.8 K/UL (ref 4.3–11.1)

## 2019-01-30 PROCEDURE — 81003 URINALYSIS AUTO W/O SCOPE: CPT

## 2019-01-30 PROCEDURE — 74011000258 HC RX REV CODE- 258: Performed by: INTERNAL MEDICINE

## 2019-01-30 PROCEDURE — 82962 GLUCOSE BLOOD TEST: CPT

## 2019-01-30 PROCEDURE — 99218 HC RM OBSERVATION: CPT

## 2019-01-30 PROCEDURE — 96375 TX/PRO/DX INJ NEW DRUG ADDON: CPT

## 2019-01-30 PROCEDURE — 74011250637 HC RX REV CODE- 250/637: Performed by: NURSE PRACTITIONER

## 2019-01-30 PROCEDURE — 96366 THER/PROPH/DIAG IV INF ADDON: CPT

## 2019-01-30 PROCEDURE — 74011250637 HC RX REV CODE- 250/637: Performed by: INTERNAL MEDICINE

## 2019-01-30 PROCEDURE — 85730 THROMBOPLASTIN TIME PARTIAL: CPT

## 2019-01-30 PROCEDURE — 36415 COLL VENOUS BLD VENIPUNCTURE: CPT

## 2019-01-30 PROCEDURE — 85025 COMPLETE CBC W/AUTO DIFF WBC: CPT

## 2019-01-30 PROCEDURE — 74011636637 HC RX REV CODE- 636/637: Performed by: NURSE PRACTITIONER

## 2019-01-30 PROCEDURE — 83735 ASSAY OF MAGNESIUM: CPT

## 2019-01-30 PROCEDURE — 96368 THER/DIAG CONCURRENT INF: CPT

## 2019-01-30 PROCEDURE — 80048 BASIC METABOLIC PNL TOTAL CA: CPT

## 2019-01-30 PROCEDURE — 77030019605

## 2019-01-30 PROCEDURE — 96367 TX/PROPH/DG ADDL SEQ IV INF: CPT

## 2019-01-30 PROCEDURE — 74011250636 HC RX REV CODE- 250/636: Performed by: INTERNAL MEDICINE

## 2019-01-30 RX ORDER — HEPARIN SODIUM 5000 [USP'U]/ML
30 INJECTION, SOLUTION INTRAVENOUS; SUBCUTANEOUS ONCE
Status: COMPLETED | OUTPATIENT
Start: 2019-01-30 | End: 2019-01-30

## 2019-01-30 RX ORDER — HEPARIN SODIUM 5000 [USP'U]/100ML
12-25 INJECTION, SOLUTION INTRAVENOUS
Status: DISCONTINUED | OUTPATIENT
Start: 2019-01-30 | End: 2019-01-31

## 2019-01-30 RX ADMIN — INSULIN HUMAN 15 UNITS: 100 INJECTION, SUSPENSION SUBCUTANEOUS at 08:25

## 2019-01-30 RX ADMIN — AMLODIPINE BESYLATE 10 MG: 5 TABLET ORAL at 08:15

## 2019-01-30 RX ADMIN — AMIODARONE HYDROCHLORIDE 150 MG: 50 INJECTION, SOLUTION INTRAVENOUS at 17:12

## 2019-01-30 RX ADMIN — Medication 10 ML: at 20:33

## 2019-01-30 RX ADMIN — AMIODARONE HYDROCHLORIDE 1 MG/MIN: 50 INJECTION, SOLUTION INTRAVENOUS at 17:21

## 2019-01-30 RX ADMIN — TAMSULOSIN HYDROCHLORIDE 0.4 MG: 0.4 CAPSULE ORAL at 08:15

## 2019-01-30 RX ADMIN — GABAPENTIN 600 MG: 300 CAPSULE ORAL at 08:13

## 2019-01-30 RX ADMIN — PRAZOSIN HYDROCHLORIDE 1 MG: 1 CAPSULE ORAL at 21:23

## 2019-01-30 RX ADMIN — INSULIN HUMAN 15 UNITS: 100 INJECTION, SUSPENSION SUBCUTANEOUS at 20:41

## 2019-01-30 RX ADMIN — ISOSORBIDE MONONITRATE 30 MG: 60 TABLET, EXTENDED RELEASE ORAL at 08:14

## 2019-01-30 RX ADMIN — GABAPENTIN 600 MG: 300 CAPSULE ORAL at 20:33

## 2019-01-30 RX ADMIN — MORPHINE SULFATE 15 MG: 15 TABLET ORAL at 20:32

## 2019-01-30 RX ADMIN — HEPARIN SODIUM 12 UNITS/KG/HR: 5000 INJECTION, SOLUTION INTRAVENOUS at 17:02

## 2019-01-30 RX ADMIN — GALANTAMINE 8 MG: 4 TABLET, FILM COATED ORAL at 17:22

## 2019-01-30 RX ADMIN — ASPIRIN 81 MG: 81 TABLET, COATED ORAL at 08:13

## 2019-01-30 RX ADMIN — GALANTAMINE 8 MG: 4 TABLET, FILM COATED ORAL at 08:13

## 2019-01-30 RX ADMIN — HYDRALAZINE HYDROCHLORIDE 25 MG: 50 TABLET, FILM COATED ORAL at 20:33

## 2019-01-30 RX ADMIN — ESCITALOPRAM OXALATE 15 MG: 10 TABLET ORAL at 08:11

## 2019-01-30 RX ADMIN — CLOPIDOGREL BISULFATE 75 MG: 75 TABLET ORAL at 08:15

## 2019-01-30 RX ADMIN — MORPHINE SULFATE 15 MG: 15 TABLET ORAL at 08:13

## 2019-01-30 RX ADMIN — Medication 10 ML: at 14:00

## 2019-01-30 RX ADMIN — RISPERIDONE 1 MG: 1 TABLET ORAL at 20:32

## 2019-01-30 RX ADMIN — HYDRALAZINE HYDROCHLORIDE 25 MG: 50 TABLET, FILM COATED ORAL at 08:14

## 2019-01-30 RX ADMIN — POTASSIUM CHLORIDE 20 MEQ: 20 TABLET, EXTENDED RELEASE ORAL at 08:13

## 2019-01-30 RX ADMIN — Medication 10 ML: at 06:38

## 2019-01-30 RX ADMIN — PANTOPRAZOLE SODIUM 40 MG: 40 TABLET, DELAYED RELEASE ORAL at 08:15

## 2019-01-30 RX ADMIN — HEPARIN SODIUM 3200 UNITS: 5000 INJECTION INTRAVENOUS; SUBCUTANEOUS at 17:00

## 2019-01-30 RX ADMIN — LEVOTHYROXINE SODIUM 25 MCG: 50 TABLET ORAL at 06:48

## 2019-01-30 RX ADMIN — GABAPENTIN 600 MG: 300 CAPSULE ORAL at 17:22

## 2019-01-30 RX ADMIN — OLMESARTAN MEDOXOMIL: 40 TABLET, COATED ORAL at 08:13

## 2019-01-30 RX ADMIN — CYANOCOBALAMIN TAB 1000 MCG 1000 MCG: 1000 TAB at 08:15

## 2019-01-30 NOTE — PROGRESS NOTES
Care Management Interventions PCP Verified by CM: Yes Mode of Transport at Discharge: Self Transition of Care Consult (CM Consult): Discharge Planning Physical Therapy Consult: Yes Current Support Network: Lives with Spouse Confirm Follow Up Transport: Family Plan discussed with Pt/Family/Caregiver: Yes Freedom of Choice Offered: Yes Discharge Location Discharge Placement: Home This CM spoke with pt at bedside this day. Pt verified his PCP, insurance, emergency contact Charity Barker [phone]), and home address. Pt gets his medications through the [place] or through [facility] if the [place] doesn't have the meds he needs. He reports that he lives at home with spouse with about 10 steps to navigate his split level home. He reports he lives on the ground level for safety to try and avoid having to navigate too many steps. His home DME includes a cane, walker, and w/c. He does admit that he needs assistance with his ADLs that his wife helps him with including bathing and dressing. He also gets aides through the [place] for about 2 hours a day to assist around the home. He does not drive much anymore - he confirms that his wife is able to get him to his appointments and engagements as needed. Per pt, discharge plan is to return home with spouse who is available to provide transportation. Upon review of chart, MD note and PT note indicate pt may need some additional in home assistance including physical therapy. This CM left VM for pts spouse, Nat Moran, to discuss this with her. CM will monitor for additional needs and await return phone call.

## 2019-01-30 NOTE — PROGRESS NOTES
Has converted from NSR with frequent PAC/PVC/Bigiminy into A-Fib with RVR with rate 121-145. Dr. Markos Carrillo notified and new orders received.

## 2019-01-30 NOTE — PROGRESS NOTES
1/30/2019 1:25 PM 
 
Admit Date: 1/28/2019 Admit Diagnosis: Bradycardia;Bradycardia Subjective: No cp or sob - no dizziness or syncope- htn worse with jose e and ventricular bigiminy followed by bp spikes- now runs of a tachy Objective:  
  
Visit Vitals /79 Pulse 76 Temp 98.4 °F (36.9 °C) Resp 11 Ht 6' 2\" (1.88 m) Wt 106.1 kg (233 lb 14.5 oz) SpO2 99% BMI 30.03 kg/m² Physical Exam: 
Sofy Stagger, Well Nourished, No Acute Distress, Alert & Oriented x 3, appropriate mood. Neck- supple, no JVD 
CV- regular rate and rhythm no MRG Lung- clear bilaterally Abd- soft, nontender, nondistended Ext- no edema bilaterally. Skin- warm and dry Data Review:  
Recent Labs  
  01/30/19 
0457   
K 3.7 BUN 10  
CREA 1.01  
WBC 4.8 HGB 12.1*  
HCT 36.9*  
 Assessment/Plan:  
 
Principal Problem: 
  Bradycardia (1/28/2019)- now with tachy jose e- discussed options with proceed with pm and then add lopressor and amio in am 
 
Active Problems: Hypertension (1/12/2012)- owrse- liopressor after pm 
 
  Type 2 diabetes mellitus with diabetic polyneuropathy ([facility] (7/13/2015) Weakness generalized (1/28/2019)

## 2019-01-31 ENCOUNTER — APPOINTMENT (OUTPATIENT)
Dept: GENERAL RADIOLOGY | Age: 69
DRG: 310 | End: 2019-01-31
Attending: INTERNAL MEDICINE
Payer: MEDICARE

## 2019-01-31 ENCOUNTER — HOME HEALTH ADMISSION (OUTPATIENT)
Dept: HOME HEALTH SERVICES | Facility: HOME HEALTH | Age: 69
End: 2019-01-31

## 2019-01-31 LAB
ANION GAP SERPL CALC-SCNC: 7 MMOL/L (ref 7–16)
APTT PPP: 163.8 SEC (ref 24.7–39.8)
ATRIAL RATE: 78 BPM
BASOPHILS # BLD: 0 K/UL (ref 0–0.2)
BASOPHILS NFR BLD: 1 % (ref 0–2)
BUN SERPL-MCNC: 12 MG/DL (ref 8–23)
CALCIUM SERPL-MCNC: 8.3 MG/DL (ref 8.3–10.4)
CALCULATED P AXIS, ECG09: 59 DEGREES
CALCULATED R AXIS, ECG10: 2 DEGREES
CALCULATED T AXIS, ECG11: -153 DEGREES
CHLORIDE SERPL-SCNC: 101 MMOL/L (ref 98–107)
CO2 SERPL-SCNC: 30 MMOL/L (ref 21–32)
CREAT SERPL-MCNC: 1.18 MG/DL (ref 0.8–1.5)
DIAGNOSIS, 93000: NORMAL
DIFFERENTIAL METHOD BLD: ABNORMAL
EOSINOPHIL # BLD: 0.2 K/UL (ref 0–0.8)
EOSINOPHIL NFR BLD: 3 % (ref 0.5–7.8)
ERYTHROCYTE [DISTWIDTH] IN BLOOD BY AUTOMATED COUNT: 13.8 % (ref 11.9–14.6)
GLUCOSE BLD STRIP.AUTO-MCNC: 251 MG/DL (ref 65–100)
GLUCOSE SERPL-MCNC: 197 MG/DL (ref 65–100)
HCT VFR BLD AUTO: 37 % (ref 41.1–50.3)
HGB BLD-MCNC: 12.3 G/DL (ref 13.6–17.2)
IMM GRANULOCYTES # BLD AUTO: 0 K/UL (ref 0–0.5)
IMM GRANULOCYTES NFR BLD AUTO: 0 % (ref 0–5)
LYMPHOCYTES # BLD: 2.4 K/UL (ref 0.5–4.6)
LYMPHOCYTES NFR BLD: 37 % (ref 13–44)
MCH RBC QN AUTO: 30.8 PG (ref 26.1–32.9)
MCHC RBC AUTO-ENTMCNC: 33.2 G/DL (ref 31.4–35)
MCV RBC AUTO: 92.7 FL (ref 79.6–97.8)
MONOCYTES # BLD: 0.7 K/UL (ref 0.1–1.3)
MONOCYTES NFR BLD: 10 % (ref 4–12)
NEUTS SEG # BLD: 3.2 K/UL (ref 1.7–8.2)
NEUTS SEG NFR BLD: 49 % (ref 43–78)
NRBC # BLD: 0 K/UL (ref 0–0.2)
P-R INTERVAL, ECG05: 150 MS
PLATELET # BLD AUTO: 181 K/UL (ref 150–450)
PMV BLD AUTO: 12.2 FL (ref 9.4–12.3)
POTASSIUM SERPL-SCNC: 3.6 MMOL/L (ref 3.5–5.1)
Q-T INTERVAL, ECG07: 514 MS
QRS DURATION, ECG06: 100 MS
QTC CALCULATION (BEZET), ECG08: 585 MS
RBC # BLD AUTO: 3.99 M/UL (ref 4.23–5.6)
SODIUM SERPL-SCNC: 138 MMOL/L (ref 136–145)
VENTRICULAR RATE, ECG03: 78 BPM
WBC # BLD AUTO: 6.4 K/UL (ref 4.3–11.1)

## 2019-01-31 PROCEDURE — C1785 PMKR, DUAL, RATE-RESP: HCPCS

## 2019-01-31 PROCEDURE — 74011250637 HC RX REV CODE- 250/637: Performed by: INTERNAL MEDICINE

## 2019-01-31 PROCEDURE — 74011000258 HC RX REV CODE- 258: Performed by: INTERNAL MEDICINE

## 2019-01-31 PROCEDURE — C1892 INTRO/SHEATH,FIXED,PEEL-AWAY: HCPCS

## 2019-01-31 PROCEDURE — 74011250636 HC RX REV CODE- 250/636: Performed by: INTERNAL MEDICINE

## 2019-01-31 PROCEDURE — 99152 MOD SED SAME PHYS/QHP 5/>YRS: CPT

## 2019-01-31 PROCEDURE — 74011250637 HC RX REV CODE- 250/637: Performed by: NURSE PRACTITIONER

## 2019-01-31 PROCEDURE — L3650 SO 8 ABD RESTRAINT PRE OTS: HCPCS

## 2019-01-31 PROCEDURE — 74011636320 HC RX REV CODE- 636/320: Performed by: INTERNAL MEDICINE

## 2019-01-31 PROCEDURE — 71045 X-RAY EXAM CHEST 1 VIEW: CPT

## 2019-01-31 PROCEDURE — 85730 THROMBOPLASTIN TIME PARTIAL: CPT

## 2019-01-31 PROCEDURE — C1898 LEAD, PMKR, OTHER THAN TRANS: HCPCS

## 2019-01-31 PROCEDURE — 77030018579 HC SUT TICRN1 COVD -B

## 2019-01-31 PROCEDURE — 97530 THERAPEUTIC ACTIVITIES: CPT

## 2019-01-31 PROCEDURE — 77030022704 HC SUT VLOC COVD -B

## 2019-01-31 PROCEDURE — 74011250636 HC RX REV CODE- 250/636

## 2019-01-31 PROCEDURE — 33208 INSRT HEART PM ATRIAL & VENT: CPT

## 2019-01-31 PROCEDURE — 85025 COMPLETE CBC W/AUTO DIFF WBC: CPT

## 2019-01-31 PROCEDURE — 77030037400 HC ADH TISS HI VISC EXOFIN CHMP -B

## 2019-01-31 PROCEDURE — 77030020263 HC SOL INJ SOD CL0.9% LFCR 1000ML

## 2019-01-31 PROCEDURE — 80048 BASIC METABOLIC PNL TOTAL CA: CPT

## 2019-01-31 PROCEDURE — 93005 ELECTROCARDIOGRAM TRACING: CPT | Performed by: INTERNAL MEDICINE

## 2019-01-31 PROCEDURE — 77030012935 HC DRSG AQUACEL BMS -B

## 2019-01-31 PROCEDURE — 99218 HC RM OBSERVATION: CPT

## 2019-01-31 PROCEDURE — 74011250636 HC RX REV CODE- 250/636: Performed by: NURSE PRACTITIONER

## 2019-01-31 PROCEDURE — 36415 COLL VENOUS BLD VENIPUNCTURE: CPT

## 2019-01-31 PROCEDURE — 96366 THER/PROPH/DIAG IV INF ADDON: CPT

## 2019-01-31 PROCEDURE — 82962 GLUCOSE BLOOD TEST: CPT

## 2019-01-31 PROCEDURE — 74011000250 HC RX REV CODE- 250: Performed by: INTERNAL MEDICINE

## 2019-01-31 PROCEDURE — 74011636637 HC RX REV CODE- 636/637: Performed by: NURSE PRACTITIONER

## 2019-01-31 PROCEDURE — 99153 MOD SED SAME PHYS/QHP EA: CPT

## 2019-01-31 PROCEDURE — 96376 TX/PRO/DX INJ SAME DRUG ADON: CPT

## 2019-01-31 RX ORDER — SODIUM CHLORIDE 0.9 % (FLUSH) 0.9 %
5-40 SYRINGE (ML) INJECTION AS NEEDED
Status: DISCONTINUED | OUTPATIENT
Start: 2019-01-31 | End: 2019-02-01 | Stop reason: HOSPADM

## 2019-01-31 RX ORDER — HEPARIN SODIUM 5000 [USP'U]/ML
35 INJECTION, SOLUTION INTRAVENOUS; SUBCUTANEOUS ONCE
Status: COMPLETED | OUTPATIENT
Start: 2019-01-31 | End: 2019-01-31

## 2019-01-31 RX ORDER — LIDOCAINE HYDROCHLORIDE 10 MG/ML
20 INJECTION INFILTRATION; PERINEURAL
Status: DISCONTINUED | OUTPATIENT
Start: 2019-01-31 | End: 2019-02-01 | Stop reason: HOSPADM

## 2019-01-31 RX ORDER — CEFAZOLIN SODIUM/WATER 2 G/20 ML
2 SYRINGE (ML) INTRAVENOUS ONCE
Status: COMPLETED | OUTPATIENT
Start: 2019-01-31 | End: 2019-01-31

## 2019-01-31 RX ORDER — MIDAZOLAM HYDROCHLORIDE 1 MG/ML
.5-2 INJECTION, SOLUTION INTRAMUSCULAR; INTRAVENOUS
Status: DISCONTINUED | OUTPATIENT
Start: 2019-01-31 | End: 2019-02-01 | Stop reason: HOSPADM

## 2019-01-31 RX ORDER — LIDOCAINE HYDROCHLORIDE AND EPINEPHRINE 10; 10 MG/ML; UG/ML
20 INJECTION, SOLUTION INFILTRATION; PERINEURAL
Status: DISCONTINUED | OUTPATIENT
Start: 2019-01-31 | End: 2019-02-01 | Stop reason: HOSPADM

## 2019-01-31 RX ORDER — SODIUM CHLORIDE 0.9 % (FLUSH) 0.9 %
5-40 SYRINGE (ML) INJECTION EVERY 8 HOURS
Status: DISCONTINUED | OUTPATIENT
Start: 2019-01-31 | End: 2019-02-01 | Stop reason: HOSPADM

## 2019-01-31 RX ORDER — SODIUM CHLORIDE 9 MG/ML
125 INJECTION, SOLUTION INTRAVENOUS CONTINUOUS
Status: DISCONTINUED | OUTPATIENT
Start: 2019-01-31 | End: 2019-01-31

## 2019-01-31 RX ORDER — AMIODARONE HYDROCHLORIDE 200 MG/1
200 TABLET ORAL 2 TIMES DAILY
Status: DISCONTINUED | OUTPATIENT
Start: 2019-01-31 | End: 2019-02-01 | Stop reason: HOSPADM

## 2019-01-31 RX ORDER — CEFAZOLIN SODIUM/WATER 2 G/20 ML
2 SYRINGE (ML) INTRAVENOUS EVERY 8 HOURS
Status: COMPLETED | OUTPATIENT
Start: 2019-01-31 | End: 2019-02-01

## 2019-01-31 RX ADMIN — PRAZOSIN HYDROCHLORIDE 1 MG: 1 CAPSULE ORAL at 21:43

## 2019-01-31 RX ADMIN — Medication 2 G: at 21:43

## 2019-01-31 RX ADMIN — ESCITALOPRAM OXALATE 15 MG: 10 TABLET ORAL at 10:04

## 2019-01-31 RX ADMIN — HYDRALAZINE HYDROCHLORIDE 25 MG: 50 TABLET, FILM COATED ORAL at 21:43

## 2019-01-31 RX ADMIN — Medication 10 ML: at 15:00

## 2019-01-31 RX ADMIN — AMLODIPINE BESYLATE 10 MG: 5 TABLET ORAL at 10:04

## 2019-01-31 RX ADMIN — Medication 10 ML: at 21:43

## 2019-01-31 RX ADMIN — TAMSULOSIN HYDROCHLORIDE 0.4 MG: 0.4 CAPSULE ORAL at 10:02

## 2019-01-31 RX ADMIN — CYANOCOBALAMIN TAB 1000 MCG 1000 MCG: 1000 TAB at 10:04

## 2019-01-31 RX ADMIN — Medication 10 ML: at 21:44

## 2019-01-31 RX ADMIN — HEPARIN SODIUM 3700 UNITS: 5000 INJECTION INTRAVENOUS; SUBCUTANEOUS at 00:15

## 2019-01-31 RX ADMIN — GABAPENTIN 600 MG: 300 CAPSULE ORAL at 21:43

## 2019-01-31 RX ADMIN — SODIUM CHLORIDE 50000 UNITS: 900 INJECTION, SOLUTION INTRAVENOUS at 14:00

## 2019-01-31 RX ADMIN — INSULIN HUMAN 15 UNITS: 100 INJECTION, SUSPENSION SUBCUTANEOUS at 21:44

## 2019-01-31 RX ADMIN — OLMESARTAN MEDOXOMIL: 40 TABLET, COATED ORAL at 10:05

## 2019-01-31 RX ADMIN — MORPHINE SULFATE 15 MG: 15 TABLET ORAL at 10:04

## 2019-01-31 RX ADMIN — LEVOTHYROXINE SODIUM 25 MCG: 50 TABLET ORAL at 10:02

## 2019-01-31 RX ADMIN — CLOPIDOGREL BISULFATE 75 MG: 75 TABLET ORAL at 10:01

## 2019-01-31 RX ADMIN — GABAPENTIN 600 MG: 300 CAPSULE ORAL at 17:11

## 2019-01-31 RX ADMIN — RISPERIDONE 1 MG: 1 TABLET ORAL at 21:43

## 2019-01-31 RX ADMIN — HYDRALAZINE HYDROCHLORIDE 25 MG: 50 TABLET, FILM COATED ORAL at 10:05

## 2019-01-31 RX ADMIN — HYDRALAZINE HYDROCHLORIDE 25 MG: 50 TABLET, FILM COATED ORAL at 17:12

## 2019-01-31 RX ADMIN — ISOSORBIDE MONONITRATE 30 MG: 60 TABLET, EXTENDED RELEASE ORAL at 10:05

## 2019-01-31 RX ADMIN — IOPAMIDOL 10 ML: 755 INJECTION, SOLUTION INTRAVENOUS at 14:31

## 2019-01-31 RX ADMIN — HEPARIN SODIUM 11 UNITS/KG/HR: 5000 INJECTION, SOLUTION INTRAVENOUS at 09:53

## 2019-01-31 RX ADMIN — POTASSIUM CHLORIDE 20 MEQ: 20 TABLET, EXTENDED RELEASE ORAL at 10:03

## 2019-01-31 RX ADMIN — Medication 10 ML: at 05:20

## 2019-01-31 RX ADMIN — LIDOCAINE HYDROCHLORIDE 10 ML: 10 INJECTION, SOLUTION INFILTRATION; PERINEURAL at 13:40

## 2019-01-31 RX ADMIN — Medication 10 ML: at 14:00

## 2019-01-31 RX ADMIN — GALANTAMINE 8 MG: 4 TABLET, FILM COATED ORAL at 17:11

## 2019-01-31 RX ADMIN — SODIUM CHLORIDE 125 ML/HR: 900 INJECTION, SOLUTION INTRAVENOUS at 08:27

## 2019-01-31 RX ADMIN — PANTOPRAZOLE SODIUM 40 MG: 40 TABLET, DELAYED RELEASE ORAL at 10:02

## 2019-01-31 RX ADMIN — GABAPENTIN 600 MG: 300 CAPSULE ORAL at 10:03

## 2019-01-31 RX ADMIN — AMIODARONE HYDROCHLORIDE 200 MG: 200 TABLET ORAL at 17:12

## 2019-01-31 RX ADMIN — LIDOCAINE HYDROCHLORIDE,EPINEPHRINE BITARTRATE 20 ML: 10; .01 INJECTION, SOLUTION INFILTRATION; PERINEURAL at 13:39

## 2019-01-31 RX ADMIN — ASPIRIN 81 MG: 81 TABLET, COATED ORAL at 10:02

## 2019-01-31 RX ADMIN — GALANTAMINE 8 MG: 4 TABLET, FILM COATED ORAL at 10:03

## 2019-01-31 RX ADMIN — MORPHINE SULFATE 15 MG: 15 TABLET ORAL at 21:43

## 2019-01-31 RX ADMIN — Medication 2 G: at 13:10

## 2019-01-31 RX ADMIN — AMIODARONE HYDROCHLORIDE 0.5 MG/MIN: 50 INJECTION, SOLUTION INTRAVENOUS at 02:28

## 2019-01-31 NOTE — PROGRESS NOTES
TRANSFER - OUT REPORT: 
 
Verbal report given to Katia(name) on Stephany Stanton  being transferred to CVICU(unit) for routine progression of care Report consisted of patients Situation, Background, Assessment and  
Recommendations(SBAR). Information from the following report(s) Procedure Summary was reviewed with the receiving nurse. Lines:  
Peripheral IV 01/28/19 Right Antecubital (Active) Site Assessment Clean, dry, & intact 1/31/2019  3:00 AM  
Phlebitis Assessment 0 1/31/2019  3:00 AM  
Infiltration Assessment 0 1/31/2019  3:00 AM  
Dressing Status Clean, dry, & intact 1/31/2019  3:00 AM  
Dressing Type Tape;Transparent 1/31/2019  3:00 AM  
Hub Color/Line Status Pink;Flushed; Infusing;Patent 1/31/2019  3:00 AM  
Alcohol Cap Used No 1/31/2019  3:00 AM  
   
Peripheral IV 01/31/19 Left Antecubital (Active) Opportunity for questions and clarification was provided. Patient transported with: 
 Registered Nurse MARILYN Devlin Left chest incision w/ exofin & aquacel - site C/D/I Versed 1 mg IV Settings : DDDR 60/130

## 2019-01-31 NOTE — PROGRESS NOTES
This CM spoke with pt spouse this day. Discussed with wife discharge planning - plan remains that pt will return home with spouse when stable for discharge. She is very interested in additional in home care and agreeable to [facility] referral to be made. She is also agreeable to [facility] referral being made to [facility] due previous positive experience with them in the past.  [facility] referral to be made this day. No additional discharge needs voiced. CM to continue to follow.

## 2019-01-31 NOTE — PROGRESS NOTES
IV heparin rate has been adjusted based on the most recent PTT results. Lab Results Component Value Date/Time  
 aPTT 67.4 (H) 01/30/2019 11:05 PM  
 
Gtt increased and bolus ordered

## 2019-01-31 NOTE — PROCEDURES
Cardiac Catheterization Procedure Note pacer/loop    Patient ID:     Name: Karo Hutchinson Record Number: [id number]   YOB: 1950    Date of Procedure: 1/31/2019    Physician: Yennifer Mcmahan MD    Referring bittrick    Indications: This is a 70 yrs male who presents with SSS. Pre procedure dx SSS  Post procedure dx DDD biotronik MRI compatable pacing system    Blood loss less than 5 ml    Sedation. Pt received 2 mg versed and 0 mcg fentanyl for monitored conscious sedation from 1:30 to 2:00. Specimen: None    No complications    No assistants    Time out, Mallampati, and ASA performed    Procedure: left pectoral region was cleaned and prepped in a sterile fashion. Lidocaine was used for local anesthesia. Modified Seldinger technique was used to gain access to the subclavian vein. Pacer pocket was made with sharp and blunt dissection. Sheaths were placed which allowed for placement of a dual chamber device. After appropriate placement of leads the sheaths were removed and leads were attached to the pre pectoral fascia. The pocket was flushed with antibiotic solution. The device was attached to the appropriate leads and set screws were tightened. Closure was then performored with 2.0 V Marcial and 3.0 V Marcial to close the skin. All counts were correct    Dressing was applied to the skin    Pulse generator was a Edstarr 8 DR CHU serial number [id number] .      Atrial lead was a Solia S 53 serial number [id number]   Threshold 0.5 V @ 0.4 ms   A wave 3.7mV   Impedence 585 ohms    Ventricular lead was a Solia S 60 serial number [id number]    Threshold 0.8 V @ 0.4 ms   V wave 8.4 mV   [id number] ohms    Settings DDD CLS 60 130

## 2019-01-31 NOTE — PROGRESS NOTES
[facility] CARDIOLOGY PROGRESS NOTE 
      
 
1/31/2019 8:02 AM 
 
Admit Date: 1/28/2019 Subjective:  
Mr. Melinda Cross is a 70year old male who presented with bradycardia and when rate slowing medications were held he began showing Afib. Today he was in sinus rhythm with PAC but is still hypertensive. A permanent pacemaker will be placed today. ROS: 
Cardiovascular:  As noted above Objective:  
  
Vitals:  
 01/31/19 1570 01/31/19 0431 01/31/19 0502 01/31/19 0522 BP: 121/56 136/60 144/67 Pulse: 62 61 60 Resp: 12 21 22 Temp:      
SpO2: 96% 97% 95% Weight:    106.4 kg (234 lb 9.1 oz) Height:      
 
 
Physical Exam: 
General-No Acute Distress Neck- supple, no JVD 
CV- regular rate and rhythm no MRG Lung- clear bilaterally Abd- soft, nontender, nondistended Ext- no edema bilaterally. Skin- warm and dry Data Review:  
Recent Labs  
  01/31/19 
0631 01/30/19 
0457 01/29/19 
1001 01/28/19 
1513 NA  --  141 142 141 K  --  3.7 4.1 4.1 MG  --  2.0  --  2.1 BUN  --  10 12 16 CREA  --  1.01 0.99 1.12  
GLU  --  126* 154* 73 WBC 6.4 4.8 5.0 6.3 HGB 12.3* 12.1* 12.0* 12.5*  
HCT 37.0* 36.9* 36.8* 38.8*  
 180 168 171 Assessment/Plan:  
 
Principal Problem: 
  Bradycardia (1/28/2019) The current medical regimen is effective;  continue present plan and medications. Permanent pacemaker will be placed today. Active Problems: Hypertension (1/12/2012) The current medical regimen is effective;  continue present plan and medications. Type 2 diabetes mellitus with diabetic polyneuropathy ([facility] [address] (7/13/2015) The current medical regimen is effective;  continue present plan and medications. Weakness generalized (1/28/2019) The current medical regimen is effective;  continue present plan and medications. Krystal Sic 1/31/2019 8:02 AM 
 
 
 
*ATTENTION:  This note has been created by a medical student for educational purposes only. Please do not refer to the content of this note for clinical decision-making, billing, or other purposes. Please see attending physicians note to obtain clinical information on this patient. *

## 2019-01-31 NOTE — PROGRESS NOTES
[facility] [facility] Face to Face Encounter Patients Name: Camryn Casas    YOB: 1950 Ordering Physician: Dr. Kristyn Hi Primary Diagnosis: Bradycardia Bradycardia Date of Face to Face:   1/31/2019 Face to Face Encounter findings are related to primary reason for home care:   yes. 1. I certify that the patient needs intermittent care as follows: skilled nursing care:  complex care plan management 
physical therapy: strengthening, transfer training and gait/stair training 2. I certify that this patient is homebound, that is: 1) patient requires the use of a walker device, special transportation, or assistance of another to leave the home; or 2) patient's condition makes leaving the home medically contraindicated; and 3) patient has a normal inability to leave the home and leaving the home requires considerable and taxing effort. Patient may leave the home for infrequent and short duration for medical reasons, and occasional absences for non-medical reasons. Homebound status is due to the following functional limitations: Patient with strength deficits limiting the performance of all ADL's without caregiver assistance or the use of an assistive device. 3. I certify that this patient is under my care and that I, or a nurse practitioner or [facility], or clinical nurse specialist, or certified nurse midwife, working with me, had a Face-to-Face Encounter that meets the physician Face-to-Face Encounter requirements. The following are the clinical findings from the [address] [place] [address] encounter that support the need for skilled services and is a summary of the encounter: see progress notes See attached progess note Sabina Guido 1/31/2019 THE FOLLOWING TO BE COMPLETED BY THE COMMUNITY PHYSICIAN: 
 
I concur with the findings described above from the [facility] encounter that this patient is homebound and in need of a skilled service. Certifying Physician: _____________________________________ Printed Certifying Physician Name: _____________________________________ Date: _________________

## 2019-01-31 NOTE — PROGRESS NOTES
Problem: Mobility Impaired (Adult and Pediatric) Goal: *Acute Goals and Plan of Care (Insert Text) 1. Mr. Melinda Cross will perform supine to sit and sit to supine with supervision in 3 days. 2.  Mr. Melinda Cross will perform sit to stand and bed to chair with supervision in 3 days. 3.  Mr. Melinda Cross will perform gait with rolling walker 150 ft with supervision in 3 days. PHYSICAL THERAPY: Daily Note and AM 1/31/2019OBSERVATION: PT Visit Days : 2 Payor: Jillian Mix / Plan: [facility] [employer] MEDICARE CHOICE PPO/PFFS / Product Type: "[employer] Care Medicare /   
  
NAME/AGE/GENDER: Myra Maravilla is a 70 y.o. male PRIMARY DIAGNOSIS: Bradycardia Bradycardia Bradycardia Bradycardia ICD-10: Treatment Diagnosis:  
 · Generalized Muscle Weakness (M62.81) · Difficulty in walking, Not elsewhere classified (R26.2) Precaution/Allergies: 
Fosinopril and Lisinopril ASSESSMENT:  
Mr. Melinda Cross presents in supine on arrival, in CVICU, spouse present. Pt A & O x 3, on room air, states no pain. Pt and RN report to have pacemaker placement sometime this date. Pt agreeable for mobility at this time. Pt required CGA to MIN A for bed mobility, CGA for transfers. Pt ambulated with RW and very slow roya, family reports this is pt's norm. Pt overall demo improvement with ambulation distance and general mobility. Pt making progress toward goals. Pt will need reassessment s/p pacemaker placement. PT to cont to follow for acute care needs. This section established at most recent assessment PROBLEM LIST (Impairments causing functional limitations): 1. Decreased Strength 2. Decreased Transfer Abilities 3. Decreased Ambulation Ability/Technique 4. Decreased Activity Tolerance INTERVENTIONS PLANNED: (Benefits and precautions of physical therapy have been discussed with the patient.) 1. Bed Mobility 2. Gait Training 3. Therapeutic Activites 4. Therapeutic Exercise/Strengthening 5. Transfer Training TREATMENT PLAN: Frequency/Duration: 4 times a week for duration of hospital stay Rehabilitation Potential For Stated Goals: Good RECOMMENDED REHABILITATION/EQUIPMENT: (at time of discharge pending progress): Due to the probability of continued deficits (see above) this patient will likely need continued skilled physical therapy after discharge. Equipment:  
? None at this time HISTORY:  
History of Present Injury/Illness (Reason for Referral): 
Patient is a 70 y.o. male with a past medical hx of CAD s/p stents in 2008, Asthma, HTN,  DM, Depression, chronic back and neck pain, seizures, PTSD, TIA x 3, and hypothyroidism. The patient has felt weak for about a week and has had one fall. He has associated symptoms of SOB but denies CP, LEVY, LOC, AMS, N/V, ha, recent illness, being on betablocker's, taking any medications that are not prescribed to him. The patient said he recently had a trip to the ED for weakness and fatigue with a HR in the 50's where he was treated and released from the ED in April. He denies any further episodes and was not on BB at the time of his ED visit. He currently has no other complaints. During his physical exam the patient was very lethartic, had trouble concentrating, and had mild SOB Past Medical History/Comorbidities:  
Mr. Smith Joe  has a past medical history of Abdominal complaints (12/18/2013), Arthritis, Asthma, BPH (benign prostatic hyperplasia), CAD (coronary artery disease), Chronic pain, COPD, Depression (emotion) (12/18/2013), Diabetes ([address] GERD (gastroesophageal reflux disease), Hypertension, Neuropathy, Neuropathy, Other ill-defined conditions(799.89), Post traumatic stress disorder (1/12/2012), PTSD (post-traumatic stress disorder), Seizures ([address] Stroke ([address] Thrombocytopenia, unspecified ([address] (1/12/2012), and Thyroid disease.   Mr. Smith Joe  has a past surgical history that includes hx heent (2010); hx other surgical (1967/68); hx orthopaedic; bone marrow aspirate &biopsy (1/19/2012); pr cardiac surg procedure unlist; hx heart catheterization (4/23/2015); hx back surgery; and pr neurological procedure unlisted. Social History/Living Environment:  
Home Environment: Private residence # Steps to Enter: 6 One/Two Story Residence: Two story, live on 1st floor # of Interior Steps: 10 Interior Rails: Both Lift Chair Available: No 
Living Alone: No 
Support Systems: Spouse/Significant Other/Partner Patient Expects to be Discharged to[de-identified] Private residence Current DME Used/Available at Home: Shower chair, Commode, bedside, Walker, rolling Prior Level of Function/Work/Activity: 
Used rolling walker. age, COPD, PTSD Number of Personal Factors/Comorbidities that affect the Plan of Care: 3+: HIGH COMPLEXITY EXAMINATION:  
Most Recent Physical Functioning:  
Gross Assessment: 
  
         
  
Posture: 
Posture Assessment: Forward head, Rounded shoulders, Trunk flexion, Increased Balance: 
Sitting: Impaired Sitting - Static: Good (unsupported) Sitting - Dynamic: Fair (occasional) Standing: Impaired Standing - Static: Good;Fair 
Standing - Dynamic : Fair Bed Mobility: 
Supine to Sit: Contact guard assistance Sit to Supine: Minimum assistance(for B LE) Scooting: Contact guard assistance Wheelchair Mobility: 
  
Transfers: 
Sit to Stand: Contact guard assistance Stand to Sit: Contact guard assistance Gait: 
  
Speed/Precious: Shuffled; Slow Step Length: Left shortened;Right shortened Swing Pattern: Left symmetrical;Right symmetrical 
Gait Abnormalities: Decreased step clearance;Shuffling gait(forward trunk flexion) Distance (ft): 50 Feet (ft) Assistive Device: Gait belt;Walker, rolling Ambulation - Level of Assistance: Stand-by assistance;Contact guard assistance Interventions: Safety awareness training;Verbal cues Body Structures Involved: 1. Muscles Body Functions Affected: 1. Movement Related Activities and Participation Affected: 1. Mobility Number of elements that affect the Plan of Care: 3: MODERATE COMPLEXITY CLINICAL PRESENTATION:  
Presentation: Evolving clinical presentation with changing clinical characteristics: MODERATE COMPLEXITY CLINICAL DECISION MAKING:  
[employer] AM-PAC 6 Clicks Basic Mobility Inpatient Short Form How much difficulty does the patient currently have. .. Unable A Lot A Little None 1. Turning over in bed (including adjusting bedclothes, sheets and blankets)? [] 1   [] 2   [x] 3   [] 4  
2. Sitting down on and standing up from a chair with arms ( e.g., wheelchair, bedside commode, etc.)   [] 1   [] 2   [x] 3   [] 4  
3. Moving from lying on back to sitting on the side of the bed? [] 1   [] 2   [x] 3   [] 4 How much help from another person does the patient currently need. .. Total A Lot A Little None 4. Moving to and from a bed to a chair (including a wheelchair)? [] 1   [] 2   [x] 3   [] 4  
5. Need to walk in hospital room? [] 1   [] 2   [x] 3   [] 4  
6. Climbing 3-5 steps with a railing? [] 1   [] 2   [x] 3   [] 4  
© 2007, Trustees of [facility] [employer], under license to [employer]. All rights reserved Score:  Initial: 18 Most Recent: X (Date: -- ) Interpretation of Tool:  Represents activities that are increasingly more difficult (i.e. Bed mobility, Transfers, Gait). Medical Necessity:    
· Patient is expected to demonstrate progress in functional technique to increase independence with mobility and gait. . 
Reason for Services/Other Comments: 
· Patient continues to require present interventions due to patient's inability to function at baseline.   
Use of outcome tool(s) and clinical judgement create a POC that gives a: Questionable prediction of patient's progress: MODERATE COMPLEXITY  
  
 
 
 
TREATMENT:  
(In addition to Assessment/Re-Assessment sessions the following treatments were rendered) Pre-treatment Symptoms/Complaints: \"I would like to walk before the surgery\" Pain: Initial:  
Pain Intensity 1: 0  Post Session:  none Therapeutic Activity: (    23 min): Therapeutic activities including Bed transfers, Chair transfers and Ambulation on level ground to improve mobility, strength, balance and coordination. Required minimal  Safety awareness training;Verbal cues to promote motor control of bilateral, upper extremity(s), lower extremity(s). Braces/Orthotics/Lines/Etc:  
· O2 Device: Room air Treatment/Session Assessment:   
· Response to Treatment:  Fair tolerance, limited mobility prior to admission · Interdisciplinary Collaboration:  
o Registered Nurse · After treatment position/precautions:  
o Supine in bed 
o Bed/Chair-wheels locked 
o Bed in low position 
o Call light within reach 
o RN notified · Compliance with Program/Exercises: Will assess as treatment progresses · Recommendations/Intent for next treatment session: \"Next visit will focus on advancements to more challenging activities and reduction in assistance provided\". Total Treatment Duration: PT Patient Time In/Time Out Time In: 2507 Time Out: 1036 Toan Coker PT

## 2019-01-31 NOTE — PROGRESS NOTES
IV heparin rate has been adjusted based on the most recent PTT results. Lab Results Component Value Date/Time  
 aPTT 163.8 (HH) 01/31/2019 06:31 AM  
 
Drip stopped for 1 hour and will restart at a rate 3 units/kg/hour less.

## 2019-01-31 NOTE — PROCEDURES
[]Hide copied text    []Jolene for details       Cardiac Catheterization Procedure Note pacer/loop     Patient ID:      Name: Fadi New   Medical Record Number: [id number]   YOB: 1950     Date of Procedure: 1/31/2019     Physician: Shani Salinas MD     Referring bittrick     Indications: This is a 70 yrs male who presents with SSS.    Pre procedure dx SSS  Post procedure dx DDD biotronik MRI compatable pacing system     Blood loss less than 5 ml     Sedation. Pt received 2 mg versed and 0 mcg fentanyl for monitored conscious sedation from 1:30 to 2:00.     Specimen: None     No complications     No assistants     Time out, Mallampati, and ASA performed     Procedure: left pectoral region was cleaned and prepped in a sterile fashion. Lidocaine was used for local anesthesia. Modified Seldinger technique was used to gain access to the subclavian vein. Pacer pocket was made with sharp and blunt dissection. Sheaths were placed which allowed for placement of a dual chamber device. After appropriate placement of leads the sheaths were removed and leads were attached to the pre pectoral fascia. The pocket was flushed with antibiotic solution. The device was attached to the appropriate leads and set screws were tightened.  Closure was then performored with 2.0 V Marcial and 3.0 V Marcial to close the skin.     All counts were correct     Dressing was applied to the skin     Pulse generator was a Delisa CHU serial number [id number] .      Atrial lead was a Solia S 53 serial number [id number]              Threshold 0.5 V @ 0.4 ms              A wave 3.7mV              Impedence 585 ohms     Ventricular lead was a Solia S 60 serial number [id number]               Threshold 0.8 V @ 0.4 ms              V wave 8.4 mV              [id number] ohms     Settings DDD CLS 60 130

## 2019-01-31 NOTE — PROGRESS NOTES
TRANSFER - IN REPORT: 
 
Verbal report received from [address] Kezia Mckeon RN on Mary Traore  being received from [address] [place] [address] for routine progression of care Report consisted of patients Situation, Background, Assessment and  
Recommendations(SBAR). Information from the following reports was reviewed: Kardex, Procedure Summary, MAR and Recent Results. Opportunity for questions and clarification was provided. Patient received to room 105 and connected to telemetry monitor and eagle. BP cycling every 15 minutes. Assessment completed and plan of care reviewed. Patient oriented to room and call light. HOB elevated 30 degrees. left subclavian dressing aquacel and left arm sling in place. Patient voiced understanding of bedrest. Patient provided with clear liquids. Patient voiced understanding to use call light to communicate needs.

## 2019-02-01 VITALS
RESPIRATION RATE: 10 BRPM | HEART RATE: 60 BPM | BODY MASS INDEX: 30.22 KG/M2 | OXYGEN SATURATION: 97 % | WEIGHT: 235.45 LBS | SYSTOLIC BLOOD PRESSURE: 131 MMHG | HEIGHT: 74 IN | TEMPERATURE: 99 F | DIASTOLIC BLOOD PRESSURE: 71 MMHG

## 2019-02-01 LAB
BASOPHILS # BLD: 0 K/UL (ref 0–0.2)
BASOPHILS NFR BLD: 0 % (ref 0–2)
DIFFERENTIAL METHOD BLD: ABNORMAL
EOSINOPHIL # BLD: 0.2 K/UL (ref 0–0.8)
EOSINOPHIL NFR BLD: 4 % (ref 0.5–7.8)
ERYTHROCYTE [DISTWIDTH] IN BLOOD BY AUTOMATED COUNT: 14.1 % (ref 11.9–14.6)
HCT VFR BLD AUTO: 35.7 % (ref 41.1–50.3)
HGB BLD-MCNC: 11.8 G/DL (ref 13.6–17.2)
IMM GRANULOCYTES # BLD AUTO: 0 K/UL (ref 0–0.5)
IMM GRANULOCYTES NFR BLD AUTO: 0 % (ref 0–5)
LYMPHOCYTES # BLD: 1.5 K/UL (ref 0.5–4.6)
LYMPHOCYTES NFR BLD: 27 % (ref 13–44)
MCH RBC QN AUTO: 30.7 PG (ref 26.1–32.9)
MCHC RBC AUTO-ENTMCNC: 33.1 G/DL (ref 31.4–35)
MCV RBC AUTO: 93 FL (ref 79.6–97.8)
MONOCYTES # BLD: 0.5 K/UL (ref 0.1–1.3)
MONOCYTES NFR BLD: 10 % (ref 4–12)
NEUTS SEG # BLD: 3.3 K/UL (ref 1.7–8.2)
NEUTS SEG NFR BLD: 59 % (ref 43–78)
NRBC # BLD: 0 K/UL (ref 0–0.2)
PLATELET # BLD AUTO: 164 K/UL (ref 150–450)
PMV BLD AUTO: 12 FL (ref 9.4–12.3)
RBC # BLD AUTO: 3.84 M/UL (ref 4.23–5.6)
WBC # BLD AUTO: 5.5 K/UL (ref 4.3–11.1)

## 2019-02-01 PROCEDURE — 74011250637 HC RX REV CODE- 250/637: Performed by: INTERNAL MEDICINE

## 2019-02-01 PROCEDURE — 74011250637 HC RX REV CODE- 250/637: Performed by: NURSE PRACTITIONER

## 2019-02-01 PROCEDURE — 74011636637 HC RX REV CODE- 636/637: Performed by: NURSE PRACTITIONER

## 2019-02-01 PROCEDURE — 85025 COMPLETE CBC W/AUTO DIFF WBC: CPT

## 2019-02-01 PROCEDURE — 74011250636 HC RX REV CODE- 250/636: Performed by: INTERNAL MEDICINE

## 2019-02-01 PROCEDURE — 99218 HC RM OBSERVATION: CPT

## 2019-02-01 PROCEDURE — 36415 COLL VENOUS BLD VENIPUNCTURE: CPT

## 2019-02-01 PROCEDURE — 97530 THERAPEUTIC ACTIVITIES: CPT

## 2019-02-01 RX ORDER — LOSARTAN POTASSIUM 100 MG/1
100 TABLET ORAL DAILY
Qty: 30 TAB | Refills: 6 | Status: SHIPPED | OUTPATIENT
Start: 2019-02-01

## 2019-02-01 RX ORDER — METOPROLOL SUCCINATE 50 MG/1
50 TABLET, EXTENDED RELEASE ORAL DAILY
Qty: 30 TAB | Refills: 6 | Status: SHIPPED | OUTPATIENT
Start: 2019-02-02

## 2019-02-01 RX ORDER — LOSARTAN POTASSIUM 50 MG/1
100 TABLET ORAL DAILY
Status: DISCONTINUED | OUTPATIENT
Start: 2019-02-01 | End: 2019-02-01 | Stop reason: HOSPADM

## 2019-02-01 RX ORDER — AMIODARONE HYDROCHLORIDE 200 MG/1
200 TABLET ORAL 2 TIMES DAILY
Qty: 60 TAB | Refills: 3 | Status: SHIPPED | OUTPATIENT
Start: 2019-02-01 | End: 2019-02-15 | Stop reason: SDUPTHER

## 2019-02-01 RX ORDER — METOPROLOL SUCCINATE 50 MG/1
50 TABLET, EXTENDED RELEASE ORAL DAILY
Status: DISCONTINUED | OUTPATIENT
Start: 2019-02-01 | End: 2019-02-01 | Stop reason: HOSPADM

## 2019-02-01 RX ADMIN — CLOPIDOGREL BISULFATE 75 MG: 75 TABLET ORAL at 09:24

## 2019-02-01 RX ADMIN — GALANTAMINE 8 MG: 4 TABLET, FILM COATED ORAL at 09:24

## 2019-02-01 RX ADMIN — GABAPENTIN 600 MG: 300 CAPSULE ORAL at 09:24

## 2019-02-01 RX ADMIN — CYANOCOBALAMIN TAB 1000 MCG 1000 MCG: 1000 TAB at 09:25

## 2019-02-01 RX ADMIN — ISOSORBIDE MONONITRATE 30 MG: 60 TABLET, EXTENDED RELEASE ORAL at 09:24

## 2019-02-01 RX ADMIN — AMIODARONE HYDROCHLORIDE 200 MG: 200 TABLET ORAL at 05:47

## 2019-02-01 RX ADMIN — INSULIN HUMAN 15 UNITS: 100 INJECTION, SUSPENSION SUBCUTANEOUS at 09:39

## 2019-02-01 RX ADMIN — LEVOTHYROXINE SODIUM 25 MCG: 50 TABLET ORAL at 05:46

## 2019-02-01 RX ADMIN — APIXABAN 5 MG: 5 TABLET, FILM COATED ORAL at 09:24

## 2019-02-01 RX ADMIN — LOSARTAN POTASSIUM 100 MG: 50 TABLET ORAL at 09:24

## 2019-02-01 RX ADMIN — Medication 2 G: at 05:24

## 2019-02-01 RX ADMIN — HYDRALAZINE HYDROCHLORIDE 25 MG: 50 TABLET, FILM COATED ORAL at 09:25

## 2019-02-01 RX ADMIN — ASPIRIN 81 MG: 81 TABLET, COATED ORAL at 09:25

## 2019-02-01 RX ADMIN — METOPROLOL SUCCINATE 50 MG: 50 TABLET, EXTENDED RELEASE ORAL at 09:25

## 2019-02-01 RX ADMIN — PANTOPRAZOLE SODIUM 40 MG: 40 TABLET, DELAYED RELEASE ORAL at 09:25

## 2019-02-01 RX ADMIN — MORPHINE SULFATE 15 MG: 15 TABLET ORAL at 07:40

## 2019-02-01 RX ADMIN — AMLODIPINE BESYLATE 10 MG: 5 TABLET ORAL at 05:47

## 2019-02-01 RX ADMIN — ESCITALOPRAM OXALATE 15 MG: 10 TABLET ORAL at 09:25

## 2019-02-01 RX ADMIN — Medication 10 ML: at 05:25

## 2019-02-01 RX ADMIN — POTASSIUM CHLORIDE 20 MEQ: 20 TABLET, EXTENDED RELEASE ORAL at 09:25

## 2019-02-01 RX ADMIN — TAMSULOSIN HYDROCHLORIDE 0.4 MG: 0.4 CAPSULE ORAL at 09:25

## 2019-02-01 NOTE — PROGRESS NOTES
Pt discharged at this time via wheelchair. Family with him. All belongings, prescriptions sent home as well.

## 2019-02-01 NOTE — DISCHARGE SUMMARY
Physician Discharge Summary     Patient ID:  Dioni Braajas  [id number]  89 y.o.  1950    Admit date: 1/28/2019    Discharge date and time: 2/1/19    Admitting Physician: Precious Rivas MD     Primary Cardiologist:Dr. Franny Chandra    Primary Care Roseanna Thomas MD    Discharge Physician: Rekha Mccullough NP    Admission Diagnoses: Bradycardia  Bradycardia    Discharge Diagnoses:   Patient Active Problem List    Diagnosis Date Noted    Bradycardia 01/28/2019    Weakness generalized 01/28/2019    Type 2 diabetes with nephropathy ([address] 11/06/2018    Moderate major depression ([address] 08/10/2018    Type 2 diabetes mellitus with diabetic neuropathy ([address] 07/27/2018    PAD (peripheral artery disease) ([address] 11/29/2017    Atherosclerosis of native artery of extremity with ulceration ([address] 11/29/2017    Benign nodular prostatic hyperplasia without lower urinary tract symptoms 05/25/2017    History of CVA (cerebrovascular accident) 09/14/2016    Left-sided weakness 06/29/2016    Syncope 06/29/2016    PTSD (post-traumatic stress disorder) 10/02/2015    Type 2 diabetes mellitus with diabetic polyneuropathy ([address] 07/13/2015    Accelerated hypertension 04/23/2015    Coronary artery disease 04/14/2015    Chronic pain syndrome 08/08/2014    Lumbar spondylosis 08/08/2014    Spinal stenosis 08/08/2014    Hyperlipidemia 05/02/2014    Dementia 05/02/2014    Depression (emotion) 12/18/2013    Abdominal complaints 12/18/2013    Thrombocytopenia, unspecified ([address] 01/12/2012    Post traumatic stress disorder 01/12/2012    Hypertension 01/12/2012    CAD (coronary artery disease) 01/12/2012           Hospital Course:Patient is a 70 y.o. male with a past medical hx of CAD s/p stents in 2008, Asthma, HTN,  DM, Depression, chronic back and neck pain, seizures, PTSD, TIA x 3, and hypothyroidism. The patient has felt weak for about a week and has had one fall.  He has associated symptoms of SOB but denies CP, LEVY, LOC, AMS, N/V, ha, recent illness, being on betablocker's, taking any medications that are not prescribed to him. The patient said he recently had a trip to the ED for weakness and fatigue with a HR in the 50's where he was treated and released from the ED in April. He denies any further episodes and was not on BB at the time of his ED visit. He currently has no other complaints. During his physical exam the patient was very lethartic, had trouble concentrating, and had mild SOB.       [facility]: Stents 2008  Echo:  8/8/2018 · The left ventricular systolic function is normal (55-65%). · Normal left ventricular diastolic function. · There is mild concentric left ventricular hypertrophy present. · Normal RV systolic function. · Normal chamber sizes. · No significant valvular abnormalities.          ------------------------------------------------------------------------------------------------------------  Pt was admitted to hospital with noted bradycardia with AMS--symptomatic. He underwent implantation of dual chamber BiotroniK PM. He tolerated that procedure well. He was seen by Dr. Zhou Armendariz with noted ventricular bigeminy, PAF. He was started on oral anticoagulation of eliquis 5 mg bid. His BP was elevated this am and toprol was added to his regimen with improved BPs. He was ambulating in gonzalez and felt acceptable for discharge. It was discussed with patient the importance of oral anticoagulation, to take this every day and monitor stools for signs and symptoms of GI bleed. Report to us or PCP any dark tarry stools or hunter blood in stool. Discharge Exam:     Visit Vitals  /63   Pulse 85   Temp 99 °F (37.2 °C)   Resp 13   Ht 6' 2\" (1.88 m)   Wt 106.8 kg (235 lb 7.2 oz)   SpO2 95%   BMI 30.23 kg/m²     General Appearance:  Well developed, well nourished,alert and oriented x 3, and individual in no acute distress.    Ears/Nose/Mouth/Throat:   Hearing grossly normal.         Neck: Supple. Chest:   Lungs clear to auscultation bilaterally. Cardiovascular:  Regular rate and rhythm, S1, S2 normal, no murmur. Abdomen:   Soft, non-tender, bowel sounds are active. Extremities: No edema bilaterally. Skin: Warm and dry. Final Laboratory Data:  Recent Results (from the past 24 hour(s))   EKG, 12 LEAD, INITIAL    Collection Time: 01/31/19  2:41 PM   Result Value Ref Range    Ventricular Rate 78 BPM    Atrial Rate 78 BPM    P-R Interval 150 ms    QRS Duration 100 ms    Q-T Interval 514 ms    QTC Calculation (Bezet) 585 ms    Calculated P Axis 59 degrees    Calculated R Axis 2 degrees    Calculated T Axis -153 degrees    Diagnosis       !!! Poor data quality, interpretation may be adversely affected  Normal sinus rhythm  Left ventricular hypertrophy with repolarization abnormality  Abnormal ECG  When compared with ECG of 28-JAN-2019 15:00,  Vent. rate has increased BY  35 BPM  Questionable change in initial forces of Septal leads  QT has lengthened  Confirmed by Venus Yee ([id number]) on 1/31/2019 2:59:35 PM     GLUCOSE, POC    Collection Time: 01/31/19  9:55 PM   Result Value Ref Range    Glucose (POC) 251 (H) 65 - 100 mg/dL   CBC WITH AUTOMATED DIFF    Collection Time: 02/01/19  5:15 AM   Result Value Ref Range    WBC 5.5 4.3 - 11.1 K/uL    RBC 3.84 (L) 4.23 - 5.6 M/uL    HGB 11.8 (L) 13.6 - 17.2 g/dL    HCT 35.7 (L) 41.1 - 50.3 %    MCV 93.0 79.6 - 97.8 FL    MCH 30.7 26.1 - 32.9 PG    MCHC 33.1 31.4 - 35.0 g/dL    RDW 14.1 11.9 - 14.6 %    PLATELET 614 295 - 858 K/uL    MPV 12.0 9.4 - 12.3 FL    ABSOLUTE NRBC 0.00 0.0 - 0.2 K/uL    DF AUTOMATED      NEUTROPHILS 59 43 - 78 %    LYMPHOCYTES 27 13 - 44 %    MONOCYTES 10 4.0 - 12.0 %    EOSINOPHILS 4 0.5 - 7.8 %    BASOPHILS 0 0.0 - 2.0 %    IMMATURE GRANULOCYTES 0 0.0 - 5.0 %    ABS. NEUTROPHILS 3.3 1.7 - 8.2 K/UL    ABS. LYMPHOCYTES 1.5 0.5 - 4.6 K/UL    ABS. MONOCYTES 0.5 0.1 - 1.3 K/UL    ABS.  EOSINOPHILS 0.2 0.0 - 0.8 K/UL ABS. BASOPHILS 0.0 0.0 - 0.2 K/UL    ABS. IMM. GRANS. 0.0 0.0 - 0.5 K/UL       Disposition: home    Patient Instructions:   Current Discharge Medication List      START taking these medications    Details   amiodarone (CORDARONE) 200 mg tablet Take 1 Tab by mouth two (2) times a day. Qty: 60 Tab, Refills: 3      apixaban (ELIQUIS) 5 mg tablet Take 1 Tab by mouth every twelve (12) hours. Qty: 60 Tab, Refills: 6      metoprolol succinate (TOPROL-XL) 50 mg XL tablet Take 1 Tab by mouth daily. Qty: 30 Tab, Refills: 6         CONTINUE these medications which have CHANGED    Details   losartan (COZAAR) 100 mg tablet Take 1 Tab by mouth daily. Qty: 30 Tab, Refills: 6         CONTINUE these medications which have NOT CHANGED    Details   hydrALAZINE (APRESOLINE) 50 mg tablet Take 0.5 Tabs by mouth three (3) times daily. Qty: 270 Tab, Refills: 6    Associated Diagnoses: Essential hypertension      Lancets (ACCU-CHEK FASTCLIX) [facility] Accu Check Fastclix LancetsPt is to check BS bid. Dx.E11.9  Qty: 200 Each, Refills: 3      glucose blood VI test strips (ACCU-CHEK GUIDE) strip Accu check Guide Strips  Pt is to check BS Bid. Dx:E11.9  Qty: 200 Strip, Refills: 3      Blood Glucose Control High&Low (ACCU-CHEK GUIDE L1-L2 CTRL SOL) soln Accu Check Guide Control Solution L1-L2 Use as directed  DX E11.9  Qty: 1 Bottle, Refills: 5      insulin NPH (NOVOLIN N, HUMULIN N) 100 unit/mL injection 100 Units by SubCUTAneous route two (2) times a day. 15 units in the morning and 15 units at bedtime      pantoprazole (PROTONIX) 40 mg tablet Take 40 mg by mouth daily. morphine IR (MS IR) 15 mg tablet Take 15 mg by mouth two (2) times a day. escitalopram oxalate (LEXAPRO) 10 mg tablet Take 15 mg by mouth daily. risperiDONE (RISPERDAL) 2 mg tablet Take 1 mg by mouth daily. levothyroxine (SYNTHROID) 25 mcg tablet Take  by mouth Daily (before breakfast).       isosorbide mononitrate ER (IMDUR) 30 mg tablet Take  by mouth daily.      cyanocobalamin (VITAMIN B-12) 1,000 mcg tablet Take 1,000 mcg by mouth daily. gabapentin (NEURONTIN) 600 mg tablet Take 1 Tab by mouth three (3) times daily. Qty: 270 Tab, Refills: 2    Associated Diagnoses: Cervical radiculopathy; Lumbar radiculopathy; Foraminal stenosis of cervical region      clopidogrel (PLAVIX) 75 mg tab Take 1 Tab by mouth daily (after breakfast). Qty: 90 Tab, Refills: 3      furosemide (LASIX) 20 mg tablet Take 1 Tab by mouth daily. Take  by mouth daily. Qty: 90 Tab, Refills: 3    Associated Diagnoses: Benign essential HTN      potassium chloride (K-DUR, KLOR-CON) 20 mEq tablet Take 1 Tab by mouth daily. Qty: 90 Tab, Refills: 3      prazosin (MINIPRESS) 1 mg capsule Take 1 Cap by mouth nightly. Qty: 90 Cap, Refills: 3      galantamine (RAZADYNE) 8 mg tablet Take 8 mg by mouth two (2) times a day. amLODIPine (NORVASC) 10 mg tablet Take  by mouth daily. tamsulosin (FLOMAX) 0.4 mg capsule Take 1 Cap by mouth daily. Qty: 90 Cap, Refills: 3    Associated Diagnoses: Urinary urgency      nitroglycerin (NITROSTAT) 0.4 mg SL tablet by SubLINGual route every five (5) minutes as needed. STOP taking these medications       aspirin delayed-release 81 mg tablet Comments:   Reason for Stopping:         clonidine (CATAPRESS) 0.2 mg tablet Comments:   Reason for Stopping:         MYRBETRIQ 50 mg ER tablet Comments:   Reason for Stopping:               Referenced discharge instructions provided by nursing for diet and activity. Follow-up:  Primary Cardiologist:Dr. Paul Colindres in 2 weeks  PCP: (Nathaniel Hollins MD) in about 4 weeks.     Signed:  Dione Lemos NP  2/1/2019  11:10 AM

## 2019-02-01 NOTE — PROGRESS NOTES
Pt with discharge orders for discharge home this day. Pt spouse to provide transportation home. [facility] referral has been made to [facility]. No additional discharge needs voiced at this time. Milestones met.

## 2019-02-01 NOTE — DISCHARGE INSTRUCTIONS
DISCHARGE SUMMARY from Nurse    PATIENT INSTRUCTIONS:    After general anesthesia or intravenous sedation, for 24 hours or while taking prescription Narcotics:  · Limit your activities  · Do not drive and operate hazardous machinery  · Do not make important personal or business decisions  · Do  not drink alcoholic beverages  · If you have not urinated within 8 hours after discharge, please contact your surgeon on call. Report the following to your surgeon:  · Excessive pain, swelling, redness or odor of or around the surgical area  · Temperature over 100.5  · Nausea and vomiting lasting longer than 4 hours or if unable to take medications  · Any signs of decreased circulation or nerve impairment to extremity: change in color, persistent  numbness, tingling, coldness or increase pain  · Any questions    What to do at Home:  Recommended activity: No heavy lifting, pushing, pulling with the implant side for 2 months,         *  Please give a list of your current medications to your Primary Care Provider. *  Please update this list whenever your medications are discontinued, doses are      changed, or new medications (including over-the-counter products) are added. *  Please carry medication information at all times in case of emergency situations. These are general instructions for a healthy lifestyle:    No smoking/ No tobacco products/ Avoid exposure to second hand smoke  Surgeon General's Warning:  Quitting smoking now greatly reduces serious risk to your health.     Obesity, smoking, and sedentary lifestyle greatly increases your risk for illness    A healthy diet, regular physical exercise & weight monitoring are important for maintaining a healthy lifestyle    You may be retaining fluid if you have a history of heart failure or if you experience any of the following symptoms:  Weight gain of 3 pounds or more overnight or 5 pounds in a week, increased swelling in our hands or feet or shortness of breath while lying flat in bed. Please call your doctor as soon as you notice any of these symptoms; do not wait until your next office visit. Recognize signs and symptoms of STROKE:    F-face looks uneven    A-arms unable to move or move unevenly    S-speech slurred or non-existent    T-time-call 911 as soon as signs and symptoms begin-DO NOT go       Back to bed or wait to see if you get better-TIME IS BRAIN. Warning Signs of HEART ATTACK     Call 911 if you have these symptoms:   Chest discomfort. Most heart attacks involve discomfort in the center of the chest that lasts more than a few minutes, or that goes away and comes back. It can feel like uncomfortable pressure, squeezing, fullness, or pain.  Discomfort in other areas of the upper body. Symptoms can include pain or discomfort in one or both arms, the back, neck, jaw, or stomach.  Shortness of breath with or without chest discomfort.  Other signs may include breaking out in a cold sweat, nausea, or lightheadedness. Don't wait more than five minutes to call 911 - MINUTES MATTER! Fast action can save your life. Calling 911 is almost always the fastest way to get lifesaving treatment. Emergency Medical Services staff can begin treatment when they arrive -- up to an hour sooner than if someone gets to the hospital by car. The discharge information has been reviewed with the patient and spouse. The patient and spouse verbalized understanding. Discharge medications reviewed with the patient and spouse and appropriate educational materials and side effects teaching were provided.   ___________________________________________________________________________________________________________________________________

## 2019-02-01 NOTE — PROGRESS NOTES
Problem: Mobility Impaired (Adult and Pediatric) Goal: *Acute Goals and Plan of Care (Insert Text) 1. Mr. Janine Hearn will perform supine to sit and sit to supine with supervision in 3 days. 2.  Mr. Janine Hearn will perform sit to stand and bed to chair with supervision in 3 days. 3.  Mr. Janine Hearn will perform gait with rolling walker 150 ft with supervision in 3 days. PHYSICAL THERAPY: Daily Note and AM 2/1/2019OBSERVATION: PT Visit Days : 3 Payor: Lizett De Jesus / Plan: [facility] [employer] MEDICARE CHOICE PPO/PFFS / Product Type: [employer] Care Medicare /   
  
NAME/AGE/GENDER: Katalina Webber is a 70 y.o. male PRIMARY DIAGNOSIS: Bradycardia Bradycardia Bradycardia Bradycardia ICD-10: Treatment Diagnosis:  
 · Generalized Muscle Weakness (M62.81) · Difficulty in walking, Not elsewhere classified (R26.2) Precaution/Allergies: 
Fosinopril and Lisinopril ASSESSMENT:  
Mr. Janine Hearn presents up in chair on arrival, L UE in sling s/p pacemaker placement. PT reviewed precautions with pt for post pacemaker surgery. Pt verbalized understanding. Pt required increased assist for sit to stand from chair this date, MIN A; ambulated with RW for 61' with CGA to MIN A using RW. Pt conts to have slow shuffled gait, this is pt's baseline. Pt overall doing fairly well and functioning close to baseline level of activity. Pt's goals still appropriate s/p pacemaker placement. Pt hopeful to discharge home today. PT to cont to follow for acute care needs. This section established at most recent assessment PROBLEM LIST (Impairments causing functional limitations): 1. Decreased Strength 2. Decreased Transfer Abilities 3. Decreased Ambulation Ability/Technique 4. Decreased Activity Tolerance INTERVENTIONS PLANNED: (Benefits and precautions of physical therapy have been discussed with the patient.) 1. Bed Mobility 2. Gait Training 3. Therapeutic Activites 4. Therapeutic Exercise/Strengthening 5. Transfer Training TREATMENT PLAN: Frequency/Duration: 4 times a week for duration of hospital stay Rehabilitation Potential For Stated Goals: Good RECOMMENDED REHABILITATION/EQUIPMENT: (at time of discharge pending progress): Due to the probability of continued deficits (see above) this patient will likely need continued skilled physical therapy after discharge. Equipment:  
? None at this time HISTORY:  
History of Present Injury/Illness (Reason for Referral): 
Patient is a 70 y.o. male with a past medical hx of CAD s/p stents in 2008, Asthma, HTN,  DM, Depression, chronic back and neck pain, seizures, PTSD, TIA x 3, and hypothyroidism. The patient has felt weak for about a week and has had one fall. He has associated symptoms of SOB but denies CP, LEVY, LOC, AMS, N/V, ha, recent illness, being on betablocker's, taking any medications that are not prescribed to him. The patient said he recently had a trip to the ED for weakness and fatigue with a HR in the 50's where he was treated and released from the ED in April. He denies any further episodes and was not on BB at the time of his ED visit. He currently has no other complaints. During his physical exam the patient was very lethartic, had trouble concentrating, and had mild SOB Past Medical History/Comorbidities:  
Mr. Oma Soares  has a past medical history of Abdominal complaints (12/18/2013), Arthritis, Asthma, BPH (benign prostatic hyperplasia), CAD (coronary artery disease), Chronic pain, COPD, Depression (emotion) (12/18/2013), Diabetes ([address] GERD (gastroesophageal reflux disease), Hypertension, Neuropathy, Neuropathy, Other ill-defined conditions(799.89), Post traumatic stress disorder (1/12/2012), PTSD (post-traumatic stress disorder), Seizures ([address] Stroke ([address] Thrombocytopenia, unspecified ([address] (1/12/2012), and Thyroid disease.   Mr. Oma Soares  has a past surgical history that includes hx heent (2010); hx other surgical (1967/68); hx orthopaedic; bone marrow aspirate &biopsy (1/19/2012); pr cardiac surg procedure unlist; hx heart catheterization (4/23/2015); hx back surgery; and pr neurological procedure unlisted. Social History/Living Environment:  
Home Environment: Private residence # Steps to Enter: 6 One/Two Story Residence: Two story, live on 1st floor # of Interior Steps: 10 Interior Rails: Both Lift Chair Available: No 
Living Alone: No 
Support Systems: Spouse/Significant Other/Partner Patient Expects to be Discharged to[de-identified] Private residence Current DME Used/Available at Home: Shower chair, Commode, bedside, Walker, rolling Prior Level of Function/Work/Activity: 
Used rolling walker. age, COPD, PTSD Number of Personal Factors/Comorbidities that affect the Plan of Care: 3+: HIGH COMPLEXITY EXAMINATION:  
Most Recent Physical Functioning:  
Gross Assessment: 
  
         
  
Posture: 
Posture Assessment: Forward head, Rounded shoulders, Trunk flexion, Increased Balance: 
Sitting: Impaired Sitting - Static: Good (unsupported) Sitting - Dynamic: Fair (occasional) Standing: Impaired Standing - Static: Good;Fair 
Standing - Dynamic : Fair Bed Mobility: 
Supine to Sit: (in chair on arrival) Sit to Supine: (left up in chair) Wheelchair Mobility: 
  
Transfers: 
Sit to Stand: Minimum assistance(from lower surface) Stand to Sit: Minimum assistance Gait: 
  
Speed/Precious: Shuffled; Slow Step Length: Left shortened;Right shortened Swing Pattern: Left symmetrical;Right symmetrical 
Gait Abnormalities: Decreased step clearance;Shuffling gait(forward trunk flexion) Distance (ft): 60 Feet (ft) Assistive Device: Gait belt;Walker, rolling Ambulation - Level of Assistance: Stand-by assistance;Contact guard assistance Interventions: Safety awareness training;Verbal cues Body Structures Involved: 1. Muscles Body Functions Affected: 1. Movement Related Activities and Participation Affected: 1. Mobility Number of elements that affect the Plan of Care: 3: MODERATE COMPLEXITY CLINICAL PRESENTATION:  
Presentation: Evolving clinical presentation with changing clinical characteristics: MODERATE COMPLEXITY CLINICAL DECISION MAKIN [facility] [address] [address] AM-PAC 6 Clicks Basic Mobility Inpatient Short Form How much difficulty does the patient currently have. .. Unable A Lot A Little None 1. Turning over in bed (including adjusting bedclothes, sheets and blankets)? [] 1   [] 2   [x] 3   [] 4  
2. Sitting down on and standing up from a chair with arms ( e.g., wheelchair, bedside commode, etc.)   [] 1   [] 2   [x] 3   [] 4  
3. Moving from lying on back to sitting on the side of the bed? [] 1   [] 2   [x] 3   [] 4 How much help from another person does the patient currently need. .. Total A Lot A Little None 4. Moving to and from a bed to a chair (including a wheelchair)? [] 1   [] 2   [x] 3   [] 4  
5. Need to walk in hospital room? [] 1   [] 2   [x] 3   [] 4  
6. Climbing 3-5 steps with a railing? [] 1   [] 2   [x] 3   [] 4  
© , Trustees of [address] [place] [address] [address] [id number], under license to [employer] All rights reserved Score:  Initial: 18 Most Recent: X (Date: -- ) Interpretation of Tool:  Represents activities that are increasingly more difficult (i.e. Bed mobility, Transfers, Gait). Medical Necessity:    
· Patient is expected to demonstrate progress in functional technique to increase independence with mobility and gait. . 
Reason for Services/Other Comments: 
· Patient continues to require present interventions due to patient's inability to function at baseline. Use of outcome tool(s) and clinical judgement create a POC that gives a: Questionable prediction of patient's progress: MODERATE COMPLEXITY  
  
 
 
 
TREATMENT:  
(In addition to Assessment/Re-Assessment sessions the following treatments were rendered) Pre-treatment Symptoms/Complaints: \"I hope I get to go home later\" Pain: Initial:  
Pain Intensity 1: 7(states sore at pacemaker site, has had medication)  Post Session:  6/10 post session Therapeutic Activity: (    23 min): Therapeutic activities including Chair transfers, weight shifting, posture, walker safety, pacemaker precautions and Ambulation on level ground to improve mobility, strength, balance and coordination. Required minimal  Safety awareness training;Verbal cues to promote motor control of bilateral, upper extremity(s), lower extremity(s). Braces/Orthotics/Lines/Etc:  
· sling for L UE 
· O2 Device: Room air Treatment/Session Assessment:   
· Response to Treatment: limited mobility prior to admission · Interdisciplinary Collaboration:  
o Registered Nurse · After treatment position/precautions:  
o Up in chair 
o Bed/Chair-wheels locked 
o Bed in low position 
o Call light within reach 
o RN notified · Compliance with Program/Exercises: Compliant all of the time · Recommendations/Intent for next treatment session: \"Next visit will focus on advancements to more challenging activities and reduction in assistance provided\". Total Treatment Duration: PT Patient Time In/Time Out Time In: 1011 Time Out: 1034 Sybil Bui, PT

## 2019-02-03 ENCOUNTER — APPOINTMENT (OUTPATIENT)
Dept: CT IMAGING | Age: 69
End: 2019-02-03
Attending: EMERGENCY MEDICINE
Payer: MEDICARE

## 2019-02-03 ENCOUNTER — APPOINTMENT (OUTPATIENT)
Dept: GENERAL RADIOLOGY | Age: 69
End: 2019-02-03
Attending: EMERGENCY MEDICINE
Payer: MEDICARE

## 2019-02-03 ENCOUNTER — HOME CARE VISIT (OUTPATIENT)
Dept: SCHEDULING | Facility: HOME HEALTH | Age: 69
End: 2019-02-03

## 2019-02-03 ENCOUNTER — HOSPITAL ENCOUNTER (OUTPATIENT)
Age: 69
Setting detail: OBSERVATION
Discharge: NURSING, REHAB OR LONG-TERM CARE | End: 2019-02-05
Attending: EMERGENCY MEDICINE | Admitting: FAMILY MEDICINE
Payer: MEDICARE

## 2019-02-03 DIAGNOSIS — Z79.4 TYPE 2 DIABETES MELLITUS WITHOUT COMPLICATION, WITH LONG-TERM CURRENT USE OF INSULIN (HCC): ICD-10-CM

## 2019-02-03 DIAGNOSIS — R53.1 WEAKNESS: Primary | ICD-10-CM

## 2019-02-03 DIAGNOSIS — I10 ESSENTIAL HYPERTENSION: ICD-10-CM

## 2019-02-03 DIAGNOSIS — E11.9 TYPE 2 DIABETES MELLITUS WITHOUT COMPLICATION, WITH LONG-TERM CURRENT USE OF INSULIN (HCC): ICD-10-CM

## 2019-02-03 PROBLEM — Z95.0 S/P PLACEMENT OF CARDIAC PACEMAKER: Status: ACTIVE | Noted: 2019-02-03

## 2019-02-03 LAB
ALBUMIN SERPL-MCNC: 2.5 G/DL (ref 3.2–4.6)
ALBUMIN/GLOB SERPL: 0.6 {RATIO} (ref 1.2–3.5)
ALP SERPL-CCNC: 72 U/L (ref 50–136)
ALT SERPL-CCNC: 37 U/L (ref 12–65)
ANION GAP SERPL CALC-SCNC: 7 MMOL/L (ref 7–16)
AST SERPL-CCNC: 76 U/L (ref 15–37)
ATRIAL RATE: 70 BPM
BASOPHILS # BLD: 0 K/UL (ref 0–0.2)
BASOPHILS NFR BLD: 0 % (ref 0–2)
BILIRUB SERPL-MCNC: 0.3 MG/DL (ref 0.2–1.1)
BUN SERPL-MCNC: 18 MG/DL (ref 8–23)
CALCIUM SERPL-MCNC: 8.5 MG/DL (ref 8.3–10.4)
CALCULATED P AXIS, ECG09: 66 DEGREES
CALCULATED R AXIS, ECG10: 71 DEGREES
CALCULATED T AXIS, ECG11: 98 DEGREES
CHLORIDE SERPL-SCNC: 108 MMOL/L (ref 98–107)
CO2 SERPL-SCNC: 28 MMOL/L (ref 21–32)
CREAT SERPL-MCNC: 1.09 MG/DL (ref 0.8–1.5)
DIAGNOSIS, 93000: NORMAL
DIFFERENTIAL METHOD BLD: ABNORMAL
EOSINOPHIL # BLD: 0.4 K/UL (ref 0–0.8)
EOSINOPHIL NFR BLD: 6 % (ref 0.5–7.8)
ERYTHROCYTE [DISTWIDTH] IN BLOOD BY AUTOMATED COUNT: 14.4 % (ref 11.9–14.6)
GLOBULIN SER CALC-MCNC: 3.9 G/DL (ref 2.3–3.5)
GLUCOSE BLD STRIP.AUTO-MCNC: 112 MG/DL (ref 65–100)
GLUCOSE SERPL-MCNC: 119 MG/DL (ref 65–100)
HCT VFR BLD AUTO: 34.6 % (ref 41.1–50.3)
HGB BLD-MCNC: 11.4 G/DL (ref 13.6–17.2)
IMM GRANULOCYTES # BLD AUTO: 0 K/UL (ref 0–0.5)
IMM GRANULOCYTES NFR BLD AUTO: 1 % (ref 0–5)
LACTATE BLD-SCNC: 0.73 MMOL/L (ref 0.5–1.9)
LYMPHOCYTES # BLD: 1.9 K/UL (ref 0.5–4.6)
LYMPHOCYTES NFR BLD: 28 % (ref 13–44)
MCH RBC QN AUTO: 30.6 PG (ref 26.1–32.9)
MCHC RBC AUTO-ENTMCNC: 32.9 G/DL (ref 31.4–35)
MCV RBC AUTO: 93 FL (ref 79.6–97.8)
MONOCYTES # BLD: 0.6 K/UL (ref 0.1–1.3)
MONOCYTES NFR BLD: 8 % (ref 4–12)
NEUTS SEG # BLD: 3.8 K/UL (ref 1.7–8.2)
NEUTS SEG NFR BLD: 56 % (ref 43–78)
NRBC # BLD: 0 K/UL (ref 0–0.2)
P-R INTERVAL, ECG05: 120 MS
PLATELET # BLD AUTO: 154 K/UL (ref 150–450)
PMV BLD AUTO: 11.5 FL (ref 9.4–12.3)
POTASSIUM SERPL-SCNC: 3.7 MMOL/L (ref 3.5–5.1)
PROT SERPL-MCNC: 6.4 G/DL (ref 6.3–8.2)
Q-T INTERVAL, ECG07: 426 MS
QRS DURATION, ECG06: 92 MS
QTC CALCULATION (BEZET), ECG08: 460 MS
RBC # BLD AUTO: 3.72 M/UL (ref 4.23–5.6)
SODIUM SERPL-SCNC: 143 MMOL/L (ref 136–145)
TROPONIN I BLD-MCNC: 0.02 NG/ML (ref 0.02–0.05)
VENTRICULAR RATE, ECG03: 70 BPM
WBC # BLD AUTO: 6.8 K/UL (ref 4.3–11.1)

## 2019-02-03 PROCEDURE — 99285 EMERGENCY DEPT VISIT HI MDM: CPT | Performed by: EMERGENCY MEDICINE

## 2019-02-03 PROCEDURE — 74011250637 HC RX REV CODE- 250/637: Performed by: EMERGENCY MEDICINE

## 2019-02-03 PROCEDURE — 84484 ASSAY OF TROPONIN QUANT: CPT

## 2019-02-03 PROCEDURE — 80053 COMPREHEN METABOLIC PANEL: CPT

## 2019-02-03 PROCEDURE — 86580 TB INTRADERMAL TEST: CPT | Performed by: FAMILY MEDICINE

## 2019-02-03 PROCEDURE — 74011250637 HC RX REV CODE- 250/637: Performed by: FAMILY MEDICINE

## 2019-02-03 PROCEDURE — 82962 GLUCOSE BLOOD TEST: CPT

## 2019-02-03 PROCEDURE — 99218 HC RM OBSERVATION: CPT

## 2019-02-03 PROCEDURE — 83605 ASSAY OF LACTIC ACID: CPT

## 2019-02-03 PROCEDURE — 93005 ELECTROCARDIOGRAM TRACING: CPT | Performed by: EMERGENCY MEDICINE

## 2019-02-03 PROCEDURE — 85025 COMPLETE CBC W/AUTO DIFF WBC: CPT

## 2019-02-03 PROCEDURE — 70450 CT HEAD/BRAIN W/O DYE: CPT

## 2019-02-03 PROCEDURE — 71045 X-RAY EXAM CHEST 1 VIEW: CPT

## 2019-02-03 PROCEDURE — 74011000302 HC RX REV CODE- 302: Performed by: FAMILY MEDICINE

## 2019-02-03 RX ORDER — HYDRALAZINE HYDROCHLORIDE 25 MG/1
25 TABLET, FILM COATED ORAL 3 TIMES DAILY
Status: DISCONTINUED | OUTPATIENT
Start: 2019-02-03 | End: 2019-02-05 | Stop reason: HOSPADM

## 2019-02-03 RX ORDER — TAMSULOSIN HYDROCHLORIDE 0.4 MG/1
0.4 CAPSULE ORAL DAILY
Status: DISCONTINUED | OUTPATIENT
Start: 2019-02-04 | End: 2019-02-05 | Stop reason: HOSPADM

## 2019-02-03 RX ORDER — CLOPIDOGREL BISULFATE 75 MG/1
75 TABLET ORAL
Status: DISCONTINUED | OUTPATIENT
Start: 2019-02-04 | End: 2019-02-05 | Stop reason: HOSPADM

## 2019-02-03 RX ORDER — GABAPENTIN 300 MG/1
600 CAPSULE ORAL 3 TIMES DAILY
Status: DISCONTINUED | OUTPATIENT
Start: 2019-02-03 | End: 2019-02-05 | Stop reason: HOSPADM

## 2019-02-03 RX ORDER — POTASSIUM CHLORIDE 20 MEQ/1
20 TABLET, EXTENDED RELEASE ORAL DAILY
Status: DISCONTINUED | OUTPATIENT
Start: 2019-02-04 | End: 2019-02-05 | Stop reason: HOSPADM

## 2019-02-03 RX ORDER — AMIODARONE HYDROCHLORIDE 200 MG/1
200 TABLET ORAL 2 TIMES DAILY
Status: DISCONTINUED | OUTPATIENT
Start: 2019-02-03 | End: 2019-02-05 | Stop reason: HOSPADM

## 2019-02-03 RX ORDER — METOPROLOL SUCCINATE 50 MG/1
50 TABLET, EXTENDED RELEASE ORAL DAILY
Status: DISCONTINUED | OUTPATIENT
Start: 2019-02-04 | End: 2019-02-05 | Stop reason: HOSPADM

## 2019-02-03 RX ORDER — NITROGLYCERIN 0.3 MG/1
0.3 TABLET SUBLINGUAL AS NEEDED
Status: DISCONTINUED | OUTPATIENT
Start: 2019-02-03 | End: 2019-02-05 | Stop reason: HOSPADM

## 2019-02-03 RX ORDER — NALOXONE HYDROCHLORIDE 0.4 MG/ML
0.4 INJECTION, SOLUTION INTRAMUSCULAR; INTRAVENOUS; SUBCUTANEOUS AS NEEDED
Status: DISCONTINUED | OUTPATIENT
Start: 2019-02-03 | End: 2019-02-05 | Stop reason: HOSPADM

## 2019-02-03 RX ORDER — AMLODIPINE BESYLATE 10 MG/1
10 TABLET ORAL DAILY
Status: DISCONTINUED | OUTPATIENT
Start: 2019-02-04 | End: 2019-02-05 | Stop reason: HOSPADM

## 2019-02-03 RX ORDER — INSULIN LISPRO 100 [IU]/ML
0-10 INJECTION, SOLUTION INTRAVENOUS; SUBCUTANEOUS
Status: DISCONTINUED | OUTPATIENT
Start: 2019-02-03 | End: 2019-02-05 | Stop reason: HOSPADM

## 2019-02-03 RX ORDER — MORPHINE SULFATE 15 MG/1
15 TABLET ORAL 2 TIMES DAILY
Status: DISCONTINUED | OUTPATIENT
Start: 2019-02-03 | End: 2019-02-05 | Stop reason: HOSPADM

## 2019-02-03 RX ORDER — LEVOTHYROXINE SODIUM 50 UG/1
25 TABLET ORAL
Status: DISCONTINUED | OUTPATIENT
Start: 2019-02-04 | End: 2019-02-05 | Stop reason: HOSPADM

## 2019-02-03 RX ORDER — LOSARTAN POTASSIUM 50 MG/1
100 TABLET ORAL DAILY
Status: DISCONTINUED | OUTPATIENT
Start: 2019-02-04 | End: 2019-02-05 | Stop reason: HOSPADM

## 2019-02-03 RX ORDER — RISPERIDONE 1 MG/1
1 TABLET, FILM COATED ORAL DAILY
Status: DISCONTINUED | OUTPATIENT
Start: 2019-02-04 | End: 2019-02-05 | Stop reason: HOSPADM

## 2019-02-03 RX ORDER — LANOLIN ALCOHOL/MO/W.PET/CERES
1000 CREAM (GRAM) TOPICAL DAILY
Status: DISCONTINUED | OUTPATIENT
Start: 2019-02-04 | End: 2019-02-05 | Stop reason: HOSPADM

## 2019-02-03 RX ORDER — PRAZOSIN HYDROCHLORIDE 1 MG/1
1 CAPSULE ORAL
Status: DISCONTINUED | OUTPATIENT
Start: 2019-02-03 | End: 2019-02-05 | Stop reason: HOSPADM

## 2019-02-03 RX ORDER — PANTOPRAZOLE SODIUM 40 MG/1
40 TABLET, DELAYED RELEASE ORAL DAILY
Status: DISCONTINUED | OUTPATIENT
Start: 2019-02-04 | End: 2019-02-05 | Stop reason: HOSPADM

## 2019-02-03 RX ORDER — GALANTAMINE 4 MG/1
8 TABLET, FILM COATED ORAL 2 TIMES DAILY
Status: DISCONTINUED | OUTPATIENT
Start: 2019-02-03 | End: 2019-02-05 | Stop reason: HOSPADM

## 2019-02-03 RX ORDER — FUROSEMIDE 20 MG/1
20 TABLET ORAL DAILY
Status: DISCONTINUED | OUTPATIENT
Start: 2019-02-04 | End: 2019-02-05 | Stop reason: HOSPADM

## 2019-02-03 RX ORDER — ACETAMINOPHEN 325 MG/1
650 TABLET ORAL
Status: DISCONTINUED | OUTPATIENT
Start: 2019-02-03 | End: 2019-02-05 | Stop reason: HOSPADM

## 2019-02-03 RX ORDER — ISOSORBIDE MONONITRATE 30 MG/1
30 TABLET, EXTENDED RELEASE ORAL DAILY
Status: DISCONTINUED | OUTPATIENT
Start: 2019-02-04 | End: 2019-02-05 | Stop reason: HOSPADM

## 2019-02-03 RX ORDER — ESCITALOPRAM OXALATE 10 MG/1
15 TABLET ORAL DAILY
Status: DISCONTINUED | OUTPATIENT
Start: 2019-02-04 | End: 2019-02-05 | Stop reason: HOSPADM

## 2019-02-03 RX ORDER — MORPHINE SULFATE 15 MG/1
15 TABLET ORAL
Status: COMPLETED | OUTPATIENT
Start: 2019-02-03 | End: 2019-02-03

## 2019-02-03 RX ADMIN — PRAZOSIN HYDROCHLORIDE 1 MG: 1 CAPSULE ORAL at 22:28

## 2019-02-03 RX ADMIN — GALANTAMINE 8 MG: 4 TABLET, FILM COATED ORAL at 22:28

## 2019-02-03 RX ADMIN — HYDRALAZINE HYDROCHLORIDE 25 MG: 25 TABLET, FILM COATED ORAL at 22:20

## 2019-02-03 RX ADMIN — APIXABAN 5 MG: 5 TABLET, FILM COATED ORAL at 22:20

## 2019-02-03 RX ADMIN — AMIODARONE HYDROCHLORIDE 200 MG: 200 TABLET ORAL at 22:20

## 2019-02-03 RX ADMIN — MORPHINE SULFATE 15 MG: 15 TABLET ORAL at 22:20

## 2019-02-03 RX ADMIN — MORPHINE SULFATE 15 MG: 15 TABLET ORAL at 19:15

## 2019-02-03 RX ADMIN — TUBERCULIN PURIFIED PROTEIN DERIVATIVE 5 UNITS: 5 INJECTION, SOLUTION INTRADERMAL at 22:21

## 2019-02-03 RX ADMIN — GABAPENTIN 600 MG: 300 CAPSULE ORAL at 22:20
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
## 2019-02-03 NOTE — ED TRIAGE NOTES
Pt arrives via ems. Pt had pacemaker placed Friday and d/c home yesterday and pt is feeling severely weak. Pt NSR in 70's and then pacemaker kicked in and pt HR increased to 120's in route. /32. 20g right hand.  Pt alert and oriented x 4 on EMS arrival.
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
## 2019-02-04 LAB
ANION GAP SERPL CALC-SCNC: 7 MMOL/L (ref 7–16)
BUN SERPL-MCNC: 20 MG/DL (ref 8–23)
CALCIUM SERPL-MCNC: 8.6 MG/DL (ref 8.3–10.4)
CHLORIDE SERPL-SCNC: 107 MMOL/L (ref 98–107)
CO2 SERPL-SCNC: 29 MMOL/L (ref 21–32)
CREAT SERPL-MCNC: 0.91 MG/DL (ref 0.8–1.5)
ERYTHROCYTE [DISTWIDTH] IN BLOOD BY AUTOMATED COUNT: 14.2 % (ref 11.9–14.6)
GLUCOSE BLD STRIP.AUTO-MCNC: 122 MG/DL (ref 65–100)
GLUCOSE BLD STRIP.AUTO-MCNC: 175 MG/DL (ref 65–100)
GLUCOSE BLD STRIP.AUTO-MCNC: 201 MG/DL (ref 65–100)
GLUCOSE BLD STRIP.AUTO-MCNC: 210 MG/DL (ref 65–100)
GLUCOSE SERPL-MCNC: 119 MG/DL (ref 65–100)
HCT VFR BLD AUTO: 35.3 % (ref 41.1–50.3)
HGB BLD-MCNC: 11.6 G/DL (ref 13.6–17.2)
MCH RBC QN AUTO: 30.7 PG (ref 26.1–32.9)
MCHC RBC AUTO-ENTMCNC: 32.9 G/DL (ref 31.4–35)
MCV RBC AUTO: 93.4 FL (ref 79.6–97.8)
MM INDURATION POC: 0 MM (ref 0–5)
NRBC # BLD: 0 K/UL (ref 0–0.2)
PLATELET # BLD AUTO: 156 K/UL (ref 150–450)
PMV BLD AUTO: 11.3 FL (ref 9.4–12.3)
POTASSIUM SERPL-SCNC: 3.8 MMOL/L (ref 3.5–5.1)
PPD POC: NORMAL NEGATIVE
RBC # BLD AUTO: 3.78 M/UL (ref 4.23–5.6)
SODIUM SERPL-SCNC: 143 MMOL/L (ref 136–145)
WBC # BLD AUTO: 5.2 K/UL (ref 4.3–11.1)

## 2019-02-04 PROCEDURE — 82962 GLUCOSE BLOOD TEST: CPT

## 2019-02-04 PROCEDURE — 97162 PT EVAL MOD COMPLEX 30 MIN: CPT

## 2019-02-04 PROCEDURE — 36415 COLL VENOUS BLD VENIPUNCTURE: CPT

## 2019-02-04 PROCEDURE — 80048 BASIC METABOLIC PNL TOTAL CA: CPT

## 2019-02-04 PROCEDURE — 74011250637 HC RX REV CODE- 250/637: Performed by: FAMILY MEDICINE

## 2019-02-04 PROCEDURE — 97535 SELF CARE MNGMENT TRAINING: CPT

## 2019-02-04 PROCEDURE — 97166 OT EVAL MOD COMPLEX 45 MIN: CPT

## 2019-02-04 PROCEDURE — 99218 HC RM OBSERVATION: CPT

## 2019-02-04 PROCEDURE — 85027 COMPLETE CBC AUTOMATED: CPT

## 2019-02-04 PROCEDURE — 74011636637 HC RX REV CODE- 636/637: Performed by: FAMILY MEDICINE

## 2019-02-04 RX ORDER — POLYETHYLENE GLYCOL 3350 17 G/17G
17 POWDER, FOR SOLUTION ORAL DAILY
Status: DISCONTINUED | OUTPATIENT
Start: 2019-02-04 | End: 2019-02-05 | Stop reason: HOSPADM

## 2019-02-04 RX ADMIN — GALANTAMINE 8 MG: 4 TABLET, FILM COATED ORAL at 08:59

## 2019-02-04 RX ADMIN — HYDRALAZINE HYDROCHLORIDE 25 MG: 25 TABLET, FILM COATED ORAL at 21:24

## 2019-02-04 RX ADMIN — GALANTAMINE 8 MG: 4 TABLET, FILM COATED ORAL at 17:32

## 2019-02-04 RX ADMIN — POLYETHYLENE GLYCOL 3350 17 G: 17 POWDER, FOR SOLUTION ORAL at 17:32

## 2019-02-04 RX ADMIN — MORPHINE SULFATE 15 MG: 15 TABLET ORAL at 21:24

## 2019-02-04 RX ADMIN — PANTOPRAZOLE SODIUM 40 MG: 40 TABLET, DELAYED RELEASE ORAL at 08:56

## 2019-02-04 RX ADMIN — RISPERIDONE 1 MG: 1 TABLET ORAL at 08:59

## 2019-02-04 RX ADMIN — ESCITALOPRAM OXALATE 15 MG: 10 TABLET ORAL at 08:54

## 2019-02-04 RX ADMIN — ISOSORBIDE MONONITRATE 30 MG: 60 TABLET, EXTENDED RELEASE ORAL at 08:55

## 2019-02-04 RX ADMIN — LOSARTAN POTASSIUM 100 MG: 50 TABLET ORAL at 08:56

## 2019-02-04 RX ADMIN — AMIODARONE HYDROCHLORIDE 200 MG: 200 TABLET ORAL at 08:53

## 2019-02-04 RX ADMIN — AMIODARONE HYDROCHLORIDE 200 MG: 200 TABLET ORAL at 17:32

## 2019-02-04 RX ADMIN — CLOPIDOGREL BISULFATE 75 MG: 75 TABLET ORAL at 08:54

## 2019-02-04 RX ADMIN — INSULIN LISPRO 2 UNITS: 100 INJECTION, SOLUTION INTRAVENOUS; SUBCUTANEOUS at 21:29

## 2019-02-04 RX ADMIN — PRAZOSIN HYDROCHLORIDE 1 MG: 1 CAPSULE ORAL at 21:23

## 2019-02-04 RX ADMIN — POTASSIUM CHLORIDE 20 MEQ: 20 TABLET, EXTENDED RELEASE ORAL at 08:56

## 2019-02-04 RX ADMIN — TAMSULOSIN HYDROCHLORIDE 0.4 MG: 0.4 CAPSULE ORAL at 08:56

## 2019-02-04 RX ADMIN — GABAPENTIN 600 MG: 300 CAPSULE ORAL at 08:54

## 2019-02-04 RX ADMIN — CYANOCOBALAMIN TAB 1000 MCG 1000 MCG: 1000 TAB at 08:54

## 2019-02-04 RX ADMIN — GABAPENTIN 600 MG: 300 CAPSULE ORAL at 21:24

## 2019-02-04 RX ADMIN — FUROSEMIDE 20 MG: 20 TABLET ORAL at 08:54

## 2019-02-04 RX ADMIN — AMLODIPINE BESYLATE 10 MG: 10 TABLET ORAL at 08:53

## 2019-02-04 RX ADMIN — INSULIN LISPRO 4 UNITS: 100 INJECTION, SOLUTION INTRAVENOUS; SUBCUTANEOUS at 11:30

## 2019-02-04 RX ADMIN — APIXABAN 5 MG: 5 TABLET, FILM COATED ORAL at 21:24

## 2019-02-04 RX ADMIN — INSULIN LISPRO 4 UNITS: 100 INJECTION, SOLUTION INTRAVENOUS; SUBCUTANEOUS at 17:31

## 2019-02-04 RX ADMIN — GABAPENTIN 600 MG: 300 CAPSULE ORAL at 17:32

## 2019-02-04 RX ADMIN — METOPROLOL SUCCINATE 50 MG: 50 TABLET, EXTENDED RELEASE ORAL at 08:56

## 2019-02-04 RX ADMIN — LEVOTHYROXINE SODIUM 25 MCG: 50 TABLET ORAL at 05:10

## 2019-02-04 RX ADMIN — HYDRALAZINE HYDROCHLORIDE 25 MG: 25 TABLET, FILM COATED ORAL at 08:55

## 2019-02-04 RX ADMIN — HYDRALAZINE HYDROCHLORIDE 25 MG: 25 TABLET, FILM COATED ORAL at 17:32

## 2019-02-04 RX ADMIN — MORPHINE SULFATE 15 MG: 15 TABLET ORAL at 08:56

## 2019-02-04 RX ADMIN — APIXABAN 5 MG: 5 TABLET, FILM COATED ORAL at 08:59

## 2019-02-04 NOTE — ED NOTES
TRANSFER - OUT REPORT: 
 
Verbal report given to ALON Inman(name) on Tanmay Perez  being transferred to [facility](unit) for routine progression of care Report consisted of patients Situation, Background, Assessment and  
Recommendations(SBAR). Information from the following report(s) SBAR, ED Summary and Recent Results was reviewed with the receiving nurse. Lines:  
Peripheral IV 02/03/19 Right Hand (Active) Site Assessment Clean, dry, & intact 2/3/2019  2:52 PM  
Phlebitis Assessment 0 2/3/2019  2:52 PM  
Infiltration Assessment 0 2/3/2019  2:52 PM  
Dressing Status Clean, dry, & intact 2/3/2019  2:52 PM  
Hub Color/Line Status Pink 2/3/2019  2:52 PM  
Action Taken Blood drawn 2/3/2019  2:52 PM  
Alcohol Cap Used No 2/3/2019  2:52 PM  
  
 
Opportunity for questions and clarification was provided.    
 
Patient transported with:

## 2019-02-04 NOTE — PROGRESS NOTES
Met with patient at bedside. Patient currently lives with spouse. Per therapy, recommendation is SNF rehab. Patient states he is agreeable to SNF rehab. Patient asked that CM call his wife [facility] [place]. Wife called and she states she spoke with [employer] worker and they stated they will cover SNF rehab. Wife would like referral sent to [facility]. Referral sent. Awaiting response. Messaged left for [employer] worker to inquire how to proceed with [facility] coverage for SNF. Awaiting call back. Care Management Interventions PCP Verified by CM: Yes Mode of Transport at Discharge: BLS Transition of Care Consult (CM Consult): Discharge Planning, SNF Discharge Durable Medical Equipment: No 
Physical Therapy Consult: Yes Occupational Therapy Consult: Yes Current Support Network: Own Home, Lives with Spouse Confirm Follow Up Transport: Family Plan discussed with Pt/Family/Caregiver: Yes Freedom of Choice Offered: Yes Discharge Location Discharge Placement: Skilled nursing facility

## 2019-02-04 NOTE — PROGRESS NOTES
Box recieved from tele not functioning properly. Leads and wires replaced and still not functioning. No more boxes available. Monitor room will call when another box is available.

## 2019-02-04 NOTE — PROGRESS NOTES
Pt with weakness prior to implantation of PM and walking with walker. Interrogation of device noted normal function, no arrhythmias. Resting ECG noted NSR. No cardiac etiology to his weakness with properly functioning PM. Likely need rehab placement due to debilitate state.

## 2019-02-04 NOTE — PROGRESS NOTES
Hourly rounds performed this shift. PT assisted pt up to the chair, pt tolerated well. Ok to remove sling from L arm per cardiology NP. All needs have been met, wife at bedside. Explained to wife that per SW it will be 2-3 days before pt will be d/c to [facility]. Both states understanding.

## 2019-02-04 NOTE — PROGRESS NOTES
Hourly rounding completed on this shift. No new complaints at this time. All needs met. Pt is currently resting in bed watching TV. Will continue to monitor and give report to oncoming nurse.

## 2019-02-04 NOTE — PROGRESS NOTES
TRANSFER - IN REPORT: 
 
Verbal report received from [facility] on Jonnathan Grade  being received from ED for routine progression of care Report consisted of patients Situation, Background, Assessment and  
Recommendations(SBAR). Information from the following report(s) SBAR, Kardex, ED Summary and MAR was reviewed with the receiving nurse. Opportunity for questions and clarification was provided. Assessment completed upon patients arrival to unit and care assumed.

## 2019-02-04 NOTE — PROGRESS NOTES
02/03/19 2050 Dual Skin Pressure Injury Assessment Dual Skin Pressure Injury Assessment X Second Care Provider (Based on [address] [place] [address]) Josh Castellon RN  
 
Pt arrived to floor via stretcher. Pt alert and oriented x4. Dual skin assessment complete. Left upper chest has boarder dressing in place from pacemaker incertion. Dressing clean dry and intact. Large scar to left lower extremity pt reported from fall in the past. Bilateral knees have scabbed areas from past falls. Sacrum has no redness or open area. Allevyn placed for prevention.

## 2019-02-04 NOTE — PROGRESS NOTES
Problem: Mobility Impaired (Adult and Pediatric) Goal: *Acute Goals and Plan of Care (Insert Text) 1. Mr. Umair Bird will perform supine to sit and sit to supine with supervision in 7 days. 2.  Mr. Umair Bird will perform sit to stand and bed to chair with supervision in 7 days. 3.  Mr. Umair Bird will perform gait with least restrictive device 100 ft with supervision in 7 days. 4.  Mr. Umair Bird will perform therex to bilateral lower extremities x 25 reps in sitting in 7 days. PHYSICAL THERAPY: Initial Assessment 2/4/2019OBSERVATION:   
Payor: Sharan Tomas / Plan: [facility] [employer] MEDICARE CHOICE PPO/PFFS / Product Type: Managed Care Medicare /   
  
NAME/AGE/GENDER: Sheyla Lemos is a 78 y.o. male PRIMARY DIAGNOSIS: Generalized weakness Weakness generalized Weakness generalized ICD-10: Treatment Diagnosis:  
 · Generalized Muscle Weakness (M62.81) · Difficulty in walking, Not elsewhere classified (R26.2) · Repeated Falls (R29.6) Precaution/Allergies: 
Fosinopril and Lisinopril ASSESSMENT:  
Mr. Umair Bird presents known to this PT from evaluation just 3 days ago. At that time he was ambulating with a rolling walker and wife states it was his baseline. We discussed home health PT. Following PT evaluation patient had a pacemaker placed plus sling. Was sent home. Per Mr. Umair Bird he wasn't able to move at all at home. He was then re admitted. He tells me he is going to go to rehab. Today he requires contact guard for rolling, min assist for supine to sit and min assist of 2 for sit to stand. Min assist of 2 for gait to the chair. He does not need his sling any more per cardiology PA however he cant bear down on the walker so provided min assist of 2. Wife is not here today. Ms. Umair Bird is functioning below baseline and is therefore appropriate for skilled PT to maximize her rehab potential.  Of note he uses briefs at home. This section established at most recent assessment PROBLEM LIST (Impairments causing functional limitations): 1. Decreased Strength 2. Decreased Transfer Abilities 3. Decreased Ambulation Ability/Technique 4. Decreased Balance 5. Decreased Activity Tolerance INTERVENTIONS PLANNED: (Benefits and precautions of physical therapy have been discussed with the patient.) 1. Bed Mobility 2. Gait Training 3. Therapeutic Activites 4. Therapeutic Exercise/Strengthening 5. Transfer Training TREATMENT PLAN: Frequency/Duration: 4 times a week for duration of hospital stay Rehabilitation Potential For Stated Goals: Good RECOMMENDED REHABILITATION/EQUIPMENT: (at time of discharge pending progress): Due to the probability of continued deficits (see above) this patient will likely need continued skilled physical therapy after discharge. Equipment:  
? None at this time HISTORY:  
History of Present Injury/Illness (Reason for Referral): 
Patient is a 78 y.o. male who presented to the ED for inability to walk. Hx is significant for bradycardia s/p pacemaker with recent discharge 2/1/19, DM type 2, CVA, PTSD, HTN, spinal stenosis, depression, dementia, and CAD s/p stenting in 2008. On day of discharge, patient was ambulating in the [place] but since being home, he has had increased weakness.  
  
 
 
Past Medical History/Comorbidities:  
Mr. Bridgette Martin  has a past medical history of Abdominal complaints (12/18/2013), Arthritis, Asthma, BPH (benign prostatic hyperplasia), CAD (coronary artery disease), Chronic pain, COPD, Depression (emotion) (12/18/2013), Diabetes ([address] GERD (gastroesophageal reflux disease), Hypertension, Neuropathy, Neuropathy, Other ill-defined conditions(799.89), Post traumatic stress disorder (1/12/2012), PTSD (post-traumatic stress disorder), Seizures ([address] Stroke ([address] Thrombocytopenia, unspecified ([address] (1/12/2012), and Thyroid disease.   Mr. Bridgette Martin  has a past surgical history that includes hx heent (2010); hx other surgical (1967/68); hx orthopaedic; bone marrow aspirate &biopsy (1/19/2012); pr cardiac surg procedure unlist; hx heart catheterization (4/23/2015); hx back surgery; and pr neurological procedure unlisted. Social History/Living Environment:  
Home Environment: Private residence # Steps to Enter: 10 One/Two Story Residence: (states there are 3 stories but he lives on main level) Living Alone: No 
Support Systems: Spouse/Significant Other/Partner Patient Expects to be Discharged to[de-identified] Rehabilitation facility Current DME Used/Available at Home: [employer] Prior Level of Function/Work/Activity: 
Used a rolling walker. Wife assisted him.  
age, falls Number of Personal Factors/Comorbidities that affect the Plan of Care: 1-2: MODERATE COMPLEXITY EXAMINATION:  
Most Recent Physical Functioning:  
Gross Assessment: 
AROM: Generally decreased, functional 
PROM: Within functional limits Strength: Generally decreased, functional 
Coordination: Generally decreased, functional 
Sensation: Impaired Posture: 
Posture (WDL): Exceptions to [facility] Posture Assessment: Forward head, Rounded shoulders Balance: 
Sitting: Impaired Sitting - Static: Fair (occasional) Sitting - Dynamic: Fair (occasional) Standing: Impaired Standing - Static: Fair Standing - Dynamic : Poor Bed Mobility: 
Rolling: Contact guard assistance Supine to Sit: Contact guard assistance;Minimum assistance Wheelchair Mobility: 
  
Transfers: 
Sit to Stand: Minimum assistance;Assist x2 Stand to Sit: Minimum assistance;Assist x2 Bed to Chair: Minimum assistance;Assist x2 Gait: 
  
Speed/Precious: Slow;Shuffled Step Length: Right shortened;Left shortened Distance (ft): 2 Feet (ft) Ambulation - Level of Assistance: Minimal assistance;Assist x2 Body Structures Involved: 1. Muscles Body Functions Affected: 1. Movement Related Activities and Participation Affected: 1. Mobility Number of elements that affect the Plan of Care: 3: MODERATE COMPLEXITY CLINICAL PRESENTATION:  
Presentation: Evolving clinical presentation with changing clinical characteristics: MODERATE COMPLEXITY CLINICAL DECISION MAKIN [facility] [address] [id number] AM-PAC 6 Clicks Basic Mobility Inpatient Short Form How much difficulty does the patient currently have. .. Unable A Lot A Little None 1. Turning over in bed (including adjusting bedclothes, sheets and blankets)? [] 1   [] 2   [x] 3   [] 4  
2. Sitting down on and standing up from a chair with arms ( e.g., wheelchair, bedside commode, etc.)   [] 1   [x] 2   [] 3   [] 4  
3. Moving from lying on back to sitting on the side of the bed? [] 1   [] 2   [x] 3   [] 4 How much help from another person does the patient currently need. .. Total A Lot A Little None 4. Moving to and from a bed to a chair (including a wheelchair)? [] 1   [x] 2   [] 3   [] 4  
5. Need to walk in hospital room? [] 1   [x] 2   [] 3   [] 4  
6. Climbing 3-5 steps with a railing? [x] 1   [] 2   [] 3   [] 4  
© , Trustees of [facility] [address] [address], under license to [employer] All rights reserved Score:  Initial: 13 Most Recent: X (Date: -- ) Interpretation of Tool:  Represents activities that are increasingly more difficult (i.e. Bed mobility, Transfers, Gait). Medical Necessity:    
· Patient is expected to demonstrate progress in functional technique to decrease assistance required with mobility and gait. . 
Reason for Services/Other Comments: 
· Patient continues to require present interventions due to patient's inability to function at baseline. .  
Use of outcome tool(s) and clinical judgement create a POC that gives a: Questionable prediction of patient's progress: MODERATE COMPLEXITY  
  
 
 
 
TREATMENT:  
(In addition to Assessment/Re-Assessment sessions the following treatments were rendered) Pre-treatment Symptoms/Complaints:  none Pain: Initial: Pain Intensity 1: (unable to rate pain) Pain Location 1: Back Pain Orientation 1: Lower Pain Intervention(s) 1: Emotional support  Post Session:  none Assessment/Reassessment only, no treatment provided today Braces/Orthotics/Lines/Etc:  
· IV 
· O2 Device: Room air Treatment/Session Assessment:   
· Response to Treatment:  Fair · Interdisciplinary Collaboration:  
o Registered Nurse 
o  · After treatment position/precautions:  
o Up in chair 
o Call light within reach 
o RN notified · Compliance with Program/Exercises: Will assess as treatment progresses · Recommendations/Intent for next treatment session: \"Next visit will focus on advancements to more challenging activities and reduction in assistance provided\". Total Treatment Duration: PT Patient Time In/Time Out Time In: 3753 Time Out: 1001 Yelitza Dee Dee Edmonds, PT

## 2019-02-04 NOTE — PROGRESS NOTES
Patient has been accepted to [facility]. Family has accepted bed offer. CM has started pre-cert for [facility]. Awaiting pre-cert.

## 2019-02-04 NOTE — PROGRESS NOTES
Initial visit by  to convey care and concern and encourage patient that  services are available if desired. Offered spiritual interventions, including affirmation of emotions & chery and prayer as requested. Jenny Guevara MDiv Board Certified [address] [place] [address]

## 2019-02-04 NOTE — PROGRESS NOTES
Problem: Self Care Deficits Care Plan (Adult) Goal: *Acute Goals and Plan of Care (Insert Text) 1. Patient will complete lower body bathing and dressing with minimal assistance and adaptive equipment as needed. 2. Patient will complete toileting with minimal assistance. 3. Patient will tolerate 25 minutes of OT treatment with 2-3 rest breaks to increase activity tolerance for ADLs. 4. Patient will complete functional transfers with minimal assistance to the chair/BSC and adaptive equipment as needed with minimal cues for decreased pushing/pulling in the L UE s/p pacemaker placement. 5. Patient will complete grooming tasks with supervision to improve participation with daily tasks. 6. Patient will complete functional mobility for household distances with minimal assistance to improve safety and independence for safety tasks. Timeframe: 7 visits OCCUPATIONAL THERAPY: Initial Assessment, Daily Note and AM 2/4/2019OBSERVATION: OT Visit Days: 1 Payor: Félix Gamez / Plan: [employer] MEDICARE CHOICE PPO/PFFS / Product Type: [email or web address] Medicare /  
  
NAME/AGE/GENDER: Padmini Floyd is a 78 y.o. male PRIMARY DIAGNOSIS:  Generalized weakness Weakness generalized Weakness generalized ICD-10: Treatment Diagnosis:  
 · Generalized Muscle Weakness (M62.81) · Other lack of cordination (R27.8) · Repeated Falls (R29.6) Precautions/Allergies: 
   Fosinopril and Lisinopril ASSESSMENT:  
Mr. Milana Briscoe presents to the hospital with generalized weakness and was just discharged s/p pacemaker placement. Pt did not do well at home and his wife was not able to care for him in his condition. Pt reports that he has a hx of frequent falls and needs assistance with bathing/dressing at baseline. Pt typically uses a walker but has been limited due to not being able to press/pull through LUE. Pt has sling placed to L UE but per cardiology it is ok to remove sling at this time.  Pt agreeable to participating in OT this am. Pt's brief is wet but pt unaware of this. Pt worked on bathing while supine in bed with pt able to assist with some bathing in the front but needed assistance for his bottom. Pt needed minimal assistance rolling side to side. Pt donned new gown with moderate assistance. Pt sat to the edge of the bed and stood with minimal assistance x 2 at the edge of the bed. Pt left up working with PT at end of session. Pt demonstrates deficits in strength, activity tolerance, and standing balance impacting independence with ADL/functional transfers. Pt is currently functioning below his baseline and will benefit from OT services to address stated goals and plan of care. This section established at most recent assessment PROBLEM LIST (Impairments causing functional limitations): 1. Decreased Strength 2. Decreased ADL/Functional Activities 3. Decreased Transfer Abilities 4. Decreased Ambulation Ability/Technique 5. Decreased Balance 6. Increased Pain 7. Decreased Activity Tolerance 8. Decreased Flexibility/Joint Mobility 9. Decreased Knowledge of Precautions 10. Decreased Skin Integrity/Hygeine 11. Decreased [place] with Home Exercise Program 
12. Decreased Cognition INTERVENTIONS PLANNED: (Benefits and precautions of occupational therapy have been discussed with the patient.) 1. Activities of daily living training 2. Adaptive equipment training 3. Balance training 4. Clothing management 5. Cognitive training 6. Donning&doffing training 7. Neuromuscular re-eduation 8. Therapeutic activity 9. Therapeutic exercise TREATMENT PLAN: Frequency/Duration: Follow patient 3 times per week to address above goals. Rehabilitation Potential For Stated Goals: Good RECOMMENDED REHABILITATION/EQUIPMENT: (at time of discharge pending progress): Due to the probability of continued deficits (see above) this patient will likely need continued skilled occupational therapy after discharge. Equipment: ? TBD  
    
 
 
 
OCCUPATIONAL PROFILE AND HISTORY:  
History of Present Injury/Illness (Reason for Referral): 
See H&P Past Medical History/Comorbidities:  
Mr. Mounika Hernandez  has a past medical history of Abdominal complaints (12/18/2013), Arthritis, Asthma, BPH (benign prostatic hyperplasia), CAD (coronary artery disease), Chronic pain, COPD, Depression (emotion) (12/18/2013), Diabetes ([address] GERD (gastroesophageal reflux disease), Hypertension, Neuropathy, Neuropathy, Other ill-defined conditions(799.89), Post traumatic stress disorder (1/12/2012), PTSD (post-traumatic stress disorder), Seizures ([address] Stroke ([address] Thrombocytopenia, unspecified ([facility] [address] (1/12/2012), and Thyroid disease. Mr. Mounika Hernandez  has a past surgical history that includes hx heent (2010); hx other surgical (1967/68); hx orthopaedic; bone marrow aspirate &biopsy (1/19/2012); pr cardiac surg procedure unlist; hx heart catheterization (4/23/2015); hx back surgery; and pr neurological procedure unlisted. Social History/Living Environment:  
Home Environment: Private residence # Steps to Enter: 10 One/Two Story Residence: (states there are 3 stories but he lives on main level) Living Alone: No 
Support Systems: Spouse/Significant Other/Partner Patient Expects to be Discharged to[de-identified] Rehabilitation facility Current DME Used/Available at Home: [address] Prior Level of Function/Work/Activity: 
Pt did not do well at home and his wife was not able to care for him in his condition. Pt reports that he has a hx of frequent falls and needs assistance with bathing/dressing at baseline. Pt typically uses a walker but has been limited due to not being able to press/pull through LUE. Personal Factors:   
      Past/Current Experience:  Frequent falls; recent admission and pt did not do well going home Other factors that influence how disability is experienced by the patient:  Multiple co-morbidities Number of Personal Factors/Comorbidities that affect the Plan of Care: Expanded review of therapy/medical records (1-2):  MODERATE COMPLEXITY ASSESSMENT OF OCCUPATIONAL PERFORMANCE[de-identified]  
Activities of Daily Living:  
Basic ADLs (From Assessment) Complex ADLs (From Assessment) Feeding: Supervision Oral Facial Hygiene/Grooming: Minimum assistance Bathing: Moderate assistance Upper Body Dressing: Moderate assistance Lower Body Dressing: Maximum assistance Toileting: Maximum assistance Instrumental ADL Meal Preparation: Total assistance Homemaking: Total assistance Grooming/Bathing/Dressing Activities of Daily Living Cognitive Retraining Safety/Judgement: Fall prevention;Home safety Lower Body Bathing Bathing Assistance: Moderate assistance Perineal  : Moderate assistance(needs A with bottom) Position Performed: Supine Bed/Mat Mobility Rolling: Contact guard assistance Supine to Sit: Contact guard assistance;Minimum assistance Sit to Stand: Minimum assistance;Assist x2 Bed to Chair: Minimum assistance;Assist x2 Most Recent Physical Functioning:  
Gross Assessment: 
AROM: Generally decreased, functional 
Strength: Generally decreased, functional 
Coordination: Generally decreased, functional 
         
  
Posture: 
Posture (WDL): Exceptions to [facility] Posture Assessment: Forward head, Rounded shoulders Balance: 
Sitting: Impaired Sitting - Static: Fair (occasional) Sitting - Dynamic: Fair (occasional) Standing: Impaired Standing - Static: Fair Standing - Dynamic : Poor Bed Mobility: 
Rolling: Contact guard assistance Supine to Sit: Contact guard assistance;Minimum assistance Wheelchair Mobility: 
  
Transfers: 
Sit to Stand: Minimum assistance;Assist x2 Stand to Sit: Minimum assistance;Assist x2 Bed to Chair: Minimum assistance;Assist x2 Patient Vitals for the past 6 hrs: 
 BP SpO2 Pulse 02/04/19 0924 157/74 98 % 70 Mental Status Neurologic State: Alert Orientation Level: Oriented to person, Oriented to place Cognition: Follows commands Perception: Appears intact Perseveration: No perseveration noted Safety/Judgement: Fall prevention, Home safety Physical Skills Involved: 1. Range of Motion 2. Balance 3. Strength 4. Activity Tolerance 5. Pain (Chronic) Cognitive Skills Affected (resulting in the inability to perform in a timely and safe manner): 1. Executive Function Psychosocial Skills Affected: 1. Habits/Routines 2. Self-Awareness Number of elements that affect the Plan of Care: 5+:  HIGH COMPLEXITY CLINICAL DECISION MAKIN[address] [place] [address] AM-PAC 6 Clicks Daily Activity Inpatient Short Form How much help from another person does the patient currently need. .. Total A Lot A Little None 1. Putting on and taking off regular lower body clothing? [] 1   [x] 2   [] 3   [] 4  
2. Bathing (including washing, rinsing, drying)? [] 1   [x] 2   [] 3   [] 4  
3. Toileting, which includes using toilet, bedpan or urinal?   [] 1   [x] 2   [] 3   [] 4  
4. Putting on and taking off regular upper body clothing? [] 1   [x] 2   [] 3   [] 4  
5. Taking care of personal grooming such as brushing teeth? [] 1   [] 2   [x] 3   [] 4  
6. Eating meals? [] 1   [] 2   [x] 3   [] 4  
© , Trustees of [address] [place] [address], under license to [employer]. All rights reserved Score:  Initial: 14 Most Recent: X (Date: -- ) Interpretation of Tool:  Represents activities that are increasingly more difficult (i.e. Bed mobility, Transfers, Gait). Medical Necessity:    
· Patient demonstrates good rehab potential due to higher previous functional level. Reason for Services/Other Comments: 
· Patient continues to require skilled intervention due to decreased independence with ADL/functional transfers. Use of outcome tool(s) and clinical judgement create a POC that gives a: MODERATE COMPLEXITY TREATMENT:  
(In addition to Assessment/Re-Assessment sessions the following treatments were rendered) Pre-treatment Symptoms/Complaints:   
Pain: Initial:  
Pain Intensity 1: (unable to rate pain) Pain Location 1: Back Pain Orientation 1: Lower Pain Intervention(s) 1: Repositioned  Post Session:  same Self Care: (8 minutes): Procedure(s) (per grid) utilized to improve and/or restore self-care/home management as related to dressing and bathing. Required moderate visual, verbal and manual cueing to facilitate activities of daily living skills and compensatory activities. Braces/Orthotics/Lines/Etc:  
· O2 Device: Room air Treatment/Session Assessment:   
· Response to Treatment: Eval only · Interdisciplinary Collaboration:  
o Physical Therapist 
o Occupational Therapist 
o Registered Nurse · After treatment position/precautions:  
o Working with PT  
· Compliance with Program/Exercises: Will assess as treatment progresses. · Recommendations/Intent for next treatment session: \"Next visit will focus on advancements to more challenging activities and reduction in assistance provided\". Total Treatment Duration: OT Patient Time In/Time Out Time In: 0935 Time Out: 6999 Tasha Bennett OT

## 2019-02-04 NOTE — PROGRESS NOTES
Progress Note Patient: Claudene Gemma MRN: [id number]  SSN: [id number] YOB: 1950  Age: 78 y.o. Sex: male Admit Date: 2/3/2019 LOS: 0 days Subjective: F/U generalized weakness 78 y.o. male who presented to the ED for inability to walk. Hx is significant for bradycardia s/p pacemaker with recent discharge 2/1/19, DM type 2, CVA, PTSD, HTN, spinal stenosis, depression, dementia, and CAD s/p stenting in 2008. On day of discharge, patient was ambulating in the [place] but since being home, he has had increased weakness. Labs benign in ED. Vitals stable. CT head showed no acute changes. PT has seen and recommends rehab. Patient denies chest pain or SOB. Sitting in chair today but states still is very weak. Glucose levels stable with one time episode elevation of glucose at lunch time. Current Facility-Administered Medications Medication Dose Route Frequency  acetaminophen (TYLENOL) tablet 650 mg  650 mg Oral Q4H PRN  
 naloxone (NARCAN) injection 0.4 mg  0.4 mg IntraVENous PRN  
 tuberculin injection 5 Units  5 Units IntraDERMal ONCE  
 amiodarone (CORDARONE) tablet 200 mg  200 mg Oral BID  amLODIPine (NORVASC) tablet 10 mg  10 mg Oral DAILY  apixaban (ELIQUIS) tablet 5 mg  5 mg Oral Q12H  clopidogrel (PLAVIX) tablet 75 mg  75 mg Oral DAILY AFTER BREAKFAST  cyanocobalamin tablet 1,000 mcg  1,000 mcg Oral DAILY  escitalopram oxalate (LEXAPRO) tablet 15 mg  15 mg Oral DAILY  furosemide (LASIX) tablet 20 mg  20 mg Oral DAILY  gabapentin (NEURONTIN) capsule 600 mg  600 mg Oral TID  galantamine (RAZADYNE) tablet 8 mg  8 mg Oral BID  hydrALAZINE (APRESOLINE) tablet 25 mg  25 mg Oral TID  isosorbide mononitrate ER (IMDUR) tablet 30 mg  30 mg Oral DAILY  levothyroxine (SYNTHROID) tablet 25 mcg  25 mcg Oral ACB  losartan (COZAAR) tablet 100 mg  100 mg Oral DAILY  metoprolol succinate (TOPROL-XL) XL tablet 50 mg  50 mg Oral DAILY  morphine IR (MS IR) tablet 15 mg  15 mg Oral BID  nitroglycerin (NITROSTAT) tablet 0.3 mg  0.3 mg SubLINGual PRN  pantoprazole (PROTONIX) tablet 40 mg  40 mg Oral DAILY  risperiDONE (RisperDAL) tablet 1 mg  1 mg Oral DAILY  tamsulosin (FLOMAX) capsule 0.4 mg  0.4 mg Oral DAILY  prazosin (MINIPRESS) capsule 1 mg  1 mg Oral QHS  potassium chloride (K-DUR, KLOR-CON) SR tablet 20 mEq  20 mEq Oral DAILY  insulin lispro (HUMALOG) injection 0-10 Units  0-10 Units SubCUTAneous AC&HS Objective:  
 
Vitals:  
 02/03/19 1952 02/03/19 2355 02/04/19 2495 02/04/19 2040 BP: 185/90 146/83 132/77 157/74 Pulse:  70 66 70 Resp:  16 17 18 Temp:  98.2 °F (36.8 °C) 98.2 °F (36.8 °C) 98.2 °F (36.8 °C) SpO2:  92% 93% 98% Weight:      
Height:      
  
  
Intake and Output: 
Current Shift: No intake/output data recorded. Last three shifts: No intake/output data recorded. Physical Exam:  
General:  Alert, cooperative, no distress, appears stated age. Eyes:  Conjunctivae/corneas clear. Ears:  Normal TMs and external ear canals both ears. Nose: Nares normal. Septum midline. Mouth/Throat: Lips, mucosa, and tongue normal. Teeth and gums normal.  
Neck:  no JVD. Back:   Deferred Lungs:   Clear to auscultation bilaterally. Heart:  Regular rate and rhythm, S1, S2 normal  
Abdomen:   Soft, non-tender. Bowel sounds normal. No masses,  No organomegaly. Extremities: Extremities normal, atraumatic, no cyanosis or edema. Pulses: 2+ and symmetric all extremities. Skin: Skin color, texture, turgor normal. No rashes or lesions Lymph nodes: Cervical, supraclavicular, and axillary nodes normal.  
Neurologic: CNII-XII intact. Frail appearing and limited ROM in chair. Lab/Data Review: 
 
Recent Results (from the past 24 hour(s)) GLUCOSE, POC Collection Time: 02/03/19 10:32 PM  
Result Value Ref Range Glucose (POC) 112 (H) 65 - 100 mg/dL CBC W/O DIFF  
 Collection Time: 02/04/19  6:00 AM  
Result Value Ref Range WBC 5.2 4.3 - 11.1 K/uL  
 RBC 3.78 (L) 4.23 - 5.6 M/uL  
 HGB 11.6 (L) 13.6 - 17.2 g/dL HCT 35.3 (L) 41.1 - 50.3 % MCV 93.4 79.6 - 97.8 FL  
 MCH 30.7 26.1 - 32.9 PG  
 MCHC 32.9 31.4 - 35.0 g/dL  
 RDW 14.2 11.9 - 14.6 % PLATELET 497 510 - 574 K/uL MPV 11.3 9.4 - 12.3 FL ABSOLUTE NRBC 0.00 0.0 - 0.2 K/uL METABOLIC PANEL, BASIC Collection Time: 02/04/19  6:00 AM  
Result Value Ref Range Sodium 143 136 - 145 mmol/L Potassium 3.8 3.5 - 5.1 mmol/L Chloride 107 98 - 107 mmol/L  
 CO2 29 21 - 32 mmol/L Anion gap 7 7 - 16 mmol/L Glucose 119 (H) 65 - 100 mg/dL BUN 20 8 - 23 MG/DL Creatinine 0.91 0.8 - 1.5 MG/DL  
 GFR est AA >60 >60 ml/min/1.73m2 GFR est non-AA >60 >60 ml/min/1.73m2 Calcium 8.6 8.3 - 10.4 MG/DL  
GLUCOSE, POC Collection Time: 02/04/19  7:47 AM  
Result Value Ref Range Glucose (POC) 122 (H) 65 - 100 mg/dL GLUCOSE, POC Collection Time: 02/04/19 11:52 AM  
Result Value Ref Range Glucose (POC) 201 (H) 65 - 100 mg/dL Assessment/ Plan:  
 
Principal Problem: 
  Weakness generalized (1/28/2019) Active Problems: 
  Post traumatic stress disorder (1/12/2012) Hypertension (1/12/2012) CAD (coronary artery disease) (1/12/2012) Overview: Post 3 stents Hyperlipidemia (5/2/2014) Dementia (5/2/2014) Type 2 diabetes mellitus with diabetic polyneuropathy ([facility] [address] (7/13/2015) History of CVA (cerebrovascular accident) (9/14/2016) S/P placement of cardiac pacemaker (2/3/2019) No signs of infection or acute findings on CT head to explain generalized weakness. Check vitamin B12. TSH normal in January. Patient definitely has debility on exam. Trying to get into rehab. PT/OT.    
  
Recent pacemaker - no acute findings on EKG.  Cardiology has seen and does not think recent pacemaker is malfunctioning.  
  
 Continue SS today for glucose control. Onetime episode of hyperglycemia so will trend.  
  
Placed in observation. Plan to dc to rehab since debilitated and had recent dc from hospital without much recovery at home.  
  
DVT prophylaxis - Eliquis. Signed By: Tobias Avina DO February 4, 2019

## 2019-02-04 NOTE — PROGRESS NOTES
Interdisciplinary Rounds completed 02/04/19. Nursing, Case Management, Physician and PT present. Plan of care reviewed and updated. Waiting for Pre-cert for d/c, possibly late today or tomorrow.

## 2019-02-05 VITALS
SYSTOLIC BLOOD PRESSURE: 158 MMHG | RESPIRATION RATE: 18 BRPM | HEART RATE: 69 BPM | WEIGHT: 238 LBS | HEIGHT: 74 IN | DIASTOLIC BLOOD PRESSURE: 78 MMHG | OXYGEN SATURATION: 93 % | BODY MASS INDEX: 30.54 KG/M2 | TEMPERATURE: 99.7 F

## 2019-02-05 LAB
GLUCOSE BLD STRIP.AUTO-MCNC: 176 MG/DL (ref 65–100)
GLUCOSE BLD STRIP.AUTO-MCNC: 177 MG/DL (ref 65–100)
MM INDURATION POC: 0 MM (ref 0–5)
MM INDURATION POC: 0 MM (ref 0–5)
PPD POC: NEGATIVE NEGATIVE
PPD POC: NORMAL NEGATIVE

## 2019-02-05 PROCEDURE — 74011636637 HC RX REV CODE- 636/637: Performed by: FAMILY MEDICINE

## 2019-02-05 PROCEDURE — 74011250637 HC RX REV CODE- 250/637: Performed by: FAMILY MEDICINE

## 2019-02-05 PROCEDURE — 82962 GLUCOSE BLOOD TEST: CPT

## 2019-02-05 PROCEDURE — 99218 HC RM OBSERVATION: CPT

## 2019-02-05 RX ADMIN — GABAPENTIN 600 MG: 300 CAPSULE ORAL at 08:41

## 2019-02-05 RX ADMIN — METOPROLOL SUCCINATE 50 MG: 50 TABLET, EXTENDED RELEASE ORAL at 08:42

## 2019-02-05 RX ADMIN — PANTOPRAZOLE SODIUM 40 MG: 40 TABLET, DELAYED RELEASE ORAL at 08:42

## 2019-02-05 RX ADMIN — ESCITALOPRAM OXALATE 15 MG: 10 TABLET ORAL at 08:41

## 2019-02-05 RX ADMIN — AMIODARONE HYDROCHLORIDE 200 MG: 200 TABLET ORAL at 08:42

## 2019-02-05 RX ADMIN — APIXABAN 5 MG: 5 TABLET, FILM COATED ORAL at 08:42

## 2019-02-05 RX ADMIN — LEVOTHYROXINE SODIUM 25 MCG: 50 TABLET ORAL at 05:26

## 2019-02-05 RX ADMIN — FUROSEMIDE 20 MG: 20 TABLET ORAL at 08:43

## 2019-02-05 RX ADMIN — POTASSIUM CHLORIDE 20 MEQ: 20 TABLET, EXTENDED RELEASE ORAL at 08:42

## 2019-02-05 RX ADMIN — CYANOCOBALAMIN TAB 1000 MCG 1000 MCG: 1000 TAB at 08:43

## 2019-02-05 RX ADMIN — AMLODIPINE BESYLATE 10 MG: 10 TABLET ORAL at 08:43

## 2019-02-05 RX ADMIN — RISPERIDONE 1 MG: 1 TABLET ORAL at 08:42

## 2019-02-05 RX ADMIN — ISOSORBIDE MONONITRATE 30 MG: 60 TABLET, EXTENDED RELEASE ORAL at 08:43

## 2019-02-05 RX ADMIN — HYDRALAZINE HYDROCHLORIDE 25 MG: 25 TABLET, FILM COATED ORAL at 08:43

## 2019-02-05 RX ADMIN — MORPHINE SULFATE 15 MG: 15 TABLET ORAL at 08:43

## 2019-02-05 RX ADMIN — LOSARTAN POTASSIUM 100 MG: 50 TABLET ORAL at 08:42

## 2019-02-05 RX ADMIN — CLOPIDOGREL BISULFATE 75 MG: 75 TABLET ORAL at 08:43

## 2019-02-05 RX ADMIN — INSULIN LISPRO 2 UNITS: 100 INJECTION, SOLUTION INTRAVENOUS; SUBCUTANEOUS at 11:55

## 2019-02-05 RX ADMIN — INSULIN LISPRO 2 UNITS: 100 INJECTION, SOLUTION INTRAVENOUS; SUBCUTANEOUS at 08:41

## 2019-02-05 RX ADMIN — TAMSULOSIN HYDROCHLORIDE 0.4 MG: 0.4 CAPSULE ORAL at 08:43

## 2019-02-05 RX ADMIN — POLYETHYLENE GLYCOL 3350 17 G: 17 POWDER, FOR SOLUTION ORAL at 08:40

## 2019-02-05 RX ADMIN — GALANTAMINE 8 MG: 4 TABLET, FILM COATED ORAL at 08:41

## 2019-02-05 NOTE — DISCHARGE SUMMARY
Hospitalist Discharge Summary     Patient ID:  Prateek Brown  [id number]  83 y.o.  1950  Admit date: 2/3/2019  2:21 PM  Discharge date and time: 2/5/2019  Attending: Pinkie Goldberg, DO  PCP:  Chelo Aguirre MD  Treatment Team: Attending Provider: Pinkie Goldberg, DO; Utilization Review: Moise Friedman RN    Principal Diagnosis Weakness generalized   Principal Problem:    Weakness generalized (1/28/2019)    Active Problems:    Post traumatic stress disorder (1/12/2012)      Hypertension (1/12/2012)      CAD (coronary artery disease) (1/12/2012)      Overview: Post 3 stents      Hyperlipidemia (5/2/2014)      Dementia (5/2/2014)      Type 2 diabetes mellitus with diabetic polyneuropathy ([facility] [address] (7/13/2015)      History of CVA (cerebrovascular accident) (9/14/2016)      S/P placement of cardiac pacemaker (2/3/2019)       Patient is a 78 y.o. male who presented to the ED for inability to walk. Hx is significant for bradycardia s/p pacemaker with recent discharge 2/1/19, DM type 2, CVA, PTSD, HTN, spinal stenosis, depression, dementia, and CAD s/p stenting in 2008. On day of discharge, patient was ambulating in the [place] but since being home, he has had increased weakness.      Vitals - Stable      Labs - benign, albumin 2.5.      Chest x ray negative for acute changes  CT head negative for acute changes    Interval History (2/5): patient examined at bedside. Sleepy and still feels weak but eating well and otherwise denies chest pain, fevers/chills, shortness of breath, abdominal pain, or nausea/vomiting, diarrhea. Hospital Course:  Please refer to the admission H&P for details of presentation. In summary, the patient is admitted for worsening weakness and inability to ambulate well. Inpatient workup has been negative overall.  He did have a discharge of his pacemaker on 2/1/2019 but cardiology evaluated with no changes required for his pacemaker and remarking that his weakness preceded the pacemaker implantation. EKG not showing acute ST-T wave changes. Patient hemodynamically stable for discharge, will need outpatient follow-up with his PCP and with cardiology. Details of hospitalization discussed with patient who agrees with hospital discharge. Return precautions provided. All questions answered. Significant Diagnostic Studies:     CT HEAD WITHOUT CONTRAST.     INDICATION: Severe weakness.     COMPARISON: 8 November 2017       TECHNIQUE:   5 mm axial scans from the skull base to the vertex. Our CT  scanners use one or more of the following:  Automated exposure control,  adjustment of the mA and or kV according to patient size, iterative  reconstruction.     FINDINGS:  No acute intraparenchymal hemorrhage or abnormal extra-axial fluid  collection. The ventricles are normal size. Moderate white matter low  attenuation is present, nonspecific, likely chronic small vessel disease. No  midline shift or mass effect. Included portion of the paranasal sinuses and  orbits grossly unremarkable.     IMPRESSION  IMPRESSION:  Negative for acute intracranial abnormality. Chronic changes. CHEST X-RAY, one view.     HISTORY:  Shortness breath and weakness.       TECHNIQUE:  AP upright portable view.     COMPARISON: 31 January 2019     FINDINGS:  The lungs are clear. The heart size is normal. The costophrenic  angles are sharp. The pulmonary vasculature is unremarkable. Included portion of  the upper abdomen is unremarkable. Cardiac pacemaker and spinal stimulator type  device are present. .      IMPRESSION  IMPRESSION:  Negative for acute change.     Labs: Results:       Chemistry Recent Labs     02/04/19  0600 02/03/19  1446   * 119*    143   K 3.8 3.7    108*   CO2 29 28   BUN 20 18   CREA 0.91 1.09   CA 8.6 8.5   AGAP 7 7   AP  --  72   TP  --  6.4   ALB  --  2.5*   GLOB  --  3.9*   AGRAT  --  0.6*      CBC w/Diff Recent Labs     02/04/19  0600 02/03/19  1446   WBC 5.2 6.8   RBC 3.78* 3.72*   HGB 11.6* 11.4*   HCT 35.3* 34.6*    154   GRANS  --  56   LYMPH  --  28   EOS  --  6      Cardiac Enzymes No results for input(s): CPK, CKND1, ELIEZER in the last 72 hours. No lab exists for component: CKRMB, TROIP   Coagulation No results for input(s): PTP, INR, APTT in the last 72 hours. No lab exists for component: INREXT    Lipid Panel Lab Results   Component Value Date/Time    Cholesterol, total 110 07/27/2018 01:49 PM    HDL Cholesterol 36 (L) 07/27/2018 01:49 PM    LDL, calculated 46 07/27/2018 01:49 PM    VLDL, calculated 28 07/27/2018 01:49 PM    Triglyceride 142 07/27/2018 01:49 PM    CHOL/HDL Ratio 2.2 11/28/2017 04:28 AM      BNP No results for input(s): BNPP in the last 72 hours. Liver Enzymes Recent Labs     02/03/19  1446   TP 6.4   ALB 2.5*   AP 72   SGOT 76*      Thyroid Studies Lab Results   Component Value Date/Time    TSH 3.740 01/28/2019 03:13 PM            Discharge Exam:  Visit Vitals  /80 (BP 1 Location: Right arm, BP Patient Position: At rest)   Pulse 70   Temp 98.6 °F (37 °C)   Resp 16   Ht 6' 2\" (1.88 m)   Wt 108 kg (238 lb)   SpO2 95%   BMI 30.56 kg/m²     General appearance: alert, cooperative, no distress, appears stated age  Lungs: clear to auscultation bilaterally  Heart: regular rate and rhythm, S1, S2 normal, no murmur, click, rub or gallop  Abdomen: soft, non-tender. Bowel sounds normal. No masses,  no organomegaly  Extremities: no cyanosis or edema  Neurologic: Grossly normal    Disposition: SNF  Discharge Condition: stable  Patient Instructions:   Current Discharge Medication List      CONTINUE these medications which have NOT CHANGED    Details   amiodarone (CORDARONE) 200 mg tablet Take 1 Tab by mouth two (2) times a day. Qty: 60 Tab, Refills: 3      apixaban (ELIQUIS) 5 mg tablet Take 1 Tab by mouth every twelve (12) hours. Qty: 60 Tab, Refills: 6      metoprolol succinate (TOPROL-XL) 50 mg XL tablet Take 1 Tab by mouth daily.   Qty: 30 Tab, Refills: 6      losartan (COZAAR) 100 mg tablet Take 1 Tab by mouth daily. Qty: 30 Tab, Refills: 6      tamsulosin (FLOMAX) 0.4 mg capsule Take 1 Cap by mouth daily. Qty: 90 Cap, Refills: 3    Associated Diagnoses: Urinary urgency      hydrALAZINE (APRESOLINE) 50 mg tablet Take 0.5 Tabs by mouth three (3) times daily. Qty: 270 Tab, Refills: 6    Associated Diagnoses: Essential hypertension      Lancets (ACCU-CHEK FASTCLIX) [facility] Accu Check Fastclix LancetsPt is to check BS bid. Dx.E11.9  Qty: 200 Each, Refills: 3      glucose blood VI test strips (ACCU-CHEK GUIDE) strip Accu check Guide Strips  Pt is to check BS Bid. Dx:E11.9  Qty: 200 Strip, Refills: 3      Blood Glucose Control High&Low (ACCU-CHEK GUIDE L1-L2 CTRL SOL) soln Accu Check Guide Control Solution L1-L2 Use as directed  DX E11.9  Qty: 1 Bottle, Refills: 5      insulin NPH (NOVOLIN N, HUMULIN N) 100 unit/mL injection 100 Units by SubCUTAneous route two (2) times a day. 15 units in the morning and 15 units at bedtime      pantoprazole (PROTONIX) 40 mg tablet Take 40 mg by mouth daily. morphine IR (MS IR) 15 mg tablet Take 15 mg by mouth two (2) times a day. escitalopram oxalate (LEXAPRO) 10 mg tablet Take 15 mg by mouth daily. risperiDONE (RISPERDAL) 2 mg tablet Take 1 mg by mouth daily. levothyroxine (SYNTHROID) 25 mcg tablet Take  by mouth Daily (before breakfast). isosorbide mononitrate ER (IMDUR) 30 mg tablet Take  by mouth daily. cyanocobalamin (VITAMIN B-12) 1,000 mcg tablet Take 1,000 mcg by mouth daily. gabapentin (NEURONTIN) 600 mg tablet Take 1 Tab by mouth three (3) times daily. Qty: 270 Tab, Refills: 2    Associated Diagnoses: Cervical radiculopathy; Lumbar radiculopathy; Foraminal stenosis of cervical region      clopidogrel (PLAVIX) 75 mg tab Take 1 Tab by mouth daily (after breakfast). Qty: 90 Tab, Refills: 3      furosemide (LASIX) 20 mg tablet Take 1 Tab by mouth daily. Take  by mouth daily.   Qty: 90 Tab, Refills: 3    Associated Diagnoses: Benign essential HTN      potassium chloride (K-DUR, KLOR-CON) 20 mEq tablet Take 1 Tab by mouth daily. Qty: 90 Tab, Refills: 3      prazosin (MINIPRESS) 1 mg capsule Take 1 Cap by mouth nightly. Qty: 90 Cap, Refills: 3      galantamine (RAZADYNE) 8 mg tablet Take 8 mg by mouth two (2) times a day. amLODIPine (NORVASC) 10 mg tablet Take  by mouth daily. nitroglycerin (NITROSTAT) 0.4 mg SL tablet by SubLINGual route every five (5) minutes as needed. Activity: Activity as tolerated  Diet: Cardiac Diet  Wound Care: None needed    Follow-up  ·   With cardiology and PCP within 1-2 weeks or as previously scheduled  Time spent to discharge patient 35 minutes  Signed:   Jerica Garcia DO  2/5/2019  10:33 AM

## 2019-02-05 NOTE — INTERDISCIPLINARY ROUNDS
Interdisciplinary Rounds completed 02/05/19. Nursing, Case Management, Physician and PT present. 
  
Plan of care reviewed and updated. Pt with d/c orders.   To be picked up at 4pm

## 2019-02-05 NOTE — PROGRESS NOTES
Call received from LUIS ZAYAS JR. [facility] and patient has been approved for SNF rehab. MD notified.

## 2019-02-05 NOTE — PROGRESS NOTES
Hourly rounds completed during shift. Pt remained V-paced controlled per mon room. Swallowed pills fine. Slept well throughout night. All needs met, bed locked in low position and call light within reach. Will give report to oncoming RN.

## 2019-02-05 NOTE — PROGRESS NOTES
LATE NOTE: In accordance with Medicare guidelines, a copy of the Medicare Outpatient Observation Notice was provided to the patient's son. Oral explanation was provided and all questions answered. This MOON document was signed by patient's son & placed in the medical record under media tab. Copy provided to patient's son.  notified.

## 2019-02-05 NOTE — PROGRESS NOTES
2nd step PPD to be read between 3-4pm. Patient to discharge to [facility] today. [facility] transportation setup for Charlene d/t PPD. Message left for wife. Patient notified.

## 2019-02-14 ENCOUNTER — HOSPITAL ENCOUNTER (OUTPATIENT)
Dept: LAB | Age: 69
Discharge: HOME | End: 2019-02-14

## 2019-02-14 LAB
ANION GAP SERPL CALC-SCNC: 6 MMOL/L (ref 7–16)
BASOPHILS # BLD: 0.1 K/UL (ref 0–0.2)
BASOPHILS NFR BLD: 1 % (ref 0–2)
BUN SERPL-MCNC: 13 MG/DL (ref 8–23)
CALCIUM SERPL-MCNC: 8.1 MG/DL (ref 8.3–10.4)
CHLORIDE SERPL-SCNC: 107 MMOL/L (ref 98–107)
CO2 SERPL-SCNC: 29 MMOL/L (ref 21–32)
CREAT SERPL-MCNC: 1.11 MG/DL (ref 0.8–1.5)
DIFFERENTIAL METHOD BLD: ABNORMAL
EOSINOPHIL # BLD: 0.2 K/UL (ref 0–0.8)
EOSINOPHIL NFR BLD: 3 % (ref 0.5–7.8)
ERYTHROCYTE [DISTWIDTH] IN BLOOD BY AUTOMATED COUNT: 14.4 % (ref 11.9–14.6)
GLUCOSE SERPL-MCNC: 185 MG/DL (ref 65–100)
HCT VFR BLD AUTO: 34 % (ref 41.1–50.3)
HGB BLD-MCNC: 11.1 G/DL (ref 13.6–17.2)
IMM GRANULOCYTES # BLD AUTO: 0 K/UL (ref 0–0.5)
IMM GRANULOCYTES NFR BLD AUTO: 0 % (ref 0–5)
LYMPHOCYTES # BLD: 1.5 K/UL (ref 0.5–4.6)
LYMPHOCYTES NFR BLD: 29 % (ref 13–44)
MCH RBC QN AUTO: 30.7 PG (ref 26.1–32.9)
MCHC RBC AUTO-ENTMCNC: 32.6 G/DL (ref 31.4–35)
MCV RBC AUTO: 94.2 FL (ref 79.6–97.8)
MONOCYTES # BLD: 0.5 K/UL (ref 0.1–1.3)
MONOCYTES NFR BLD: 9 % (ref 4–12)
NEUTS SEG # BLD: 2.9 K/UL (ref 1.7–8.2)
NEUTS SEG NFR BLD: 57 % (ref 43–78)
NRBC # BLD: 0 K/UL (ref 0–0.2)
PLATELET # BLD AUTO: 259 K/UL (ref 150–450)
PMV BLD AUTO: 11.1 FL (ref 9.4–12.3)
POTASSIUM SERPL-SCNC: 4.3 MMOL/L (ref 3.5–5.1)
RBC # BLD AUTO: 3.61 M/UL (ref 4.23–5.6)
SODIUM SERPL-SCNC: 142 MMOL/L (ref 136–145)
WBC # BLD AUTO: 5 K/UL (ref 4.3–11.1)

## 2019-02-14 PROCEDURE — 80048 BASIC METABOLIC PNL TOTAL CA: CPT

## 2019-02-14 PROCEDURE — 85025 COMPLETE CBC W/AUTO DIFF WBC: CPT

## 2019-02-18 ENCOUNTER — HOME HEALTH ADMISSION (OUTPATIENT)
Dept: HOME HEALTH SERVICES | Facility: HOME HEALTH | Age: 69
End: 2019-02-18
Payer: MEDICARE

## 2019-02-24 ENCOUNTER — HOME CARE VISIT (OUTPATIENT)
Dept: SCHEDULING | Facility: HOME HEALTH | Age: 69
End: 2019-02-24
Payer: MEDICARE

## 2019-02-24 VITALS
TEMPERATURE: 97.4 F | HEART RATE: 78 BPM | RESPIRATION RATE: 16 BRPM | DIASTOLIC BLOOD PRESSURE: 70 MMHG | SYSTOLIC BLOOD PRESSURE: 116 MMHG

## 2019-02-24 PROCEDURE — G0151 HHCP-SERV OF PT,EA 15 MIN: HCPCS

## 2019-02-24 PROCEDURE — 3331090002 HH PPS REVENUE DEBIT

## 2019-02-24 PROCEDURE — 3331090001 HH PPS REVENUE CREDIT

## 2019-02-24 PROCEDURE — 400013 HH SOC

## 2019-02-25 PROCEDURE — 3331090002 HH PPS REVENUE DEBIT

## 2019-02-25 PROCEDURE — 3331090001 HH PPS REVENUE CREDIT

## 2019-02-26 ENCOUNTER — HOME CARE VISIT (OUTPATIENT)
Dept: SCHEDULING | Facility: HOME HEALTH | Age: 69
End: 2019-02-26
Payer: MEDICARE

## 2019-02-26 VITALS
DIASTOLIC BLOOD PRESSURE: 86 MMHG | HEART RATE: 60 BPM | RESPIRATION RATE: 16 BRPM | TEMPERATURE: 98.6 F | SYSTOLIC BLOOD PRESSURE: 134 MMHG

## 2019-02-26 VITALS
RESPIRATION RATE: 18 BRPM | HEART RATE: 78 BPM | TEMPERATURE: 97.3 F | DIASTOLIC BLOOD PRESSURE: 70 MMHG | SYSTOLIC BLOOD PRESSURE: 126 MMHG

## 2019-02-26 PROCEDURE — G0157 HHC PT ASSISTANT EA 15: HCPCS

## 2019-02-26 PROCEDURE — G0152 HHCP-SERV OF OT,EA 15 MIN: HCPCS

## 2019-02-26 PROCEDURE — 3331090001 HH PPS REVENUE CREDIT

## 2019-02-26 PROCEDURE — 3331090002 HH PPS REVENUE DEBIT

## 2019-02-27 PROCEDURE — 3331090002 HH PPS REVENUE DEBIT

## 2019-02-27 PROCEDURE — 3331090001 HH PPS REVENUE CREDIT

## 2019-02-28 ENCOUNTER — HOME CARE VISIT (OUTPATIENT)
Dept: SCHEDULING | Facility: HOME HEALTH | Age: 69
End: 2019-02-28
Payer: MEDICARE

## 2019-02-28 VITALS
RESPIRATION RATE: 18 BRPM | HEART RATE: 77 BPM | TEMPERATURE: 97.3 F | SYSTOLIC BLOOD PRESSURE: 124 MMHG | DIASTOLIC BLOOD PRESSURE: 64 MMHG

## 2019-02-28 PROCEDURE — 3331090002 HH PPS REVENUE DEBIT

## 2019-02-28 PROCEDURE — 3331090001 HH PPS REVENUE CREDIT

## 2019-02-28 PROCEDURE — G0157 HHC PT ASSISTANT EA 15: HCPCS

## 2019-03-01 ENCOUNTER — HOME CARE VISIT (OUTPATIENT)
Dept: SCHEDULING | Facility: HOME HEALTH | Age: 69
End: 2019-03-01
Payer: MEDICARE

## 2019-03-01 VITALS
DIASTOLIC BLOOD PRESSURE: 73 MMHG | TEMPERATURE: 98 F | RESPIRATION RATE: 15 BRPM | SYSTOLIC BLOOD PRESSURE: 124 MMHG | OXYGEN SATURATION: 94 % | HEART RATE: 86 BPM

## 2019-03-01 PROCEDURE — 3331090001 HH PPS REVENUE CREDIT

## 2019-03-01 PROCEDURE — G0158 HHC OT ASSISTANT EA 15: HCPCS

## 2019-03-01 PROCEDURE — 3331090002 HH PPS REVENUE DEBIT

## 2019-03-02 PROCEDURE — 3331090002 HH PPS REVENUE DEBIT

## 2019-03-02 PROCEDURE — 3331090001 HH PPS REVENUE CREDIT

## 2019-03-03 PROCEDURE — 3331090001 HH PPS REVENUE CREDIT

## 2019-03-03 PROCEDURE — 3331090002 HH PPS REVENUE DEBIT

## 2019-03-04 PROCEDURE — 3331090001 HH PPS REVENUE CREDIT

## 2019-03-04 PROCEDURE — 3331090002 HH PPS REVENUE DEBIT

## 2019-03-05 ENCOUNTER — HOME CARE VISIT (OUTPATIENT)
Dept: SCHEDULING | Facility: HOME HEALTH | Age: 69
End: 2019-03-05
Payer: MEDICARE

## 2019-03-05 VITALS
SYSTOLIC BLOOD PRESSURE: 114 MMHG | OXYGEN SATURATION: 93 % | DIASTOLIC BLOOD PRESSURE: 93 MMHG | HEART RATE: 72 BPM | TEMPERATURE: 97.8 F | RESPIRATION RATE: 17 BRPM

## 2019-03-05 PROCEDURE — 3331090002 HH PPS REVENUE DEBIT

## 2019-03-05 PROCEDURE — 3331090001 HH PPS REVENUE CREDIT

## 2019-03-05 PROCEDURE — G0158 HHC OT ASSISTANT EA 15: HCPCS

## 2019-03-06 ENCOUNTER — HOME CARE VISIT (OUTPATIENT)
Dept: SCHEDULING | Facility: HOME HEALTH | Age: 69
End: 2019-03-06
Payer: MEDICARE

## 2019-03-06 VITALS
HEART RATE: 72 BPM | RESPIRATION RATE: 18 BRPM | SYSTOLIC BLOOD PRESSURE: 124 MMHG | DIASTOLIC BLOOD PRESSURE: 64 MMHG | OXYGEN SATURATION: 96 % | TEMPERATURE: 97.3 F

## 2019-03-06 PROCEDURE — G0157 HHC PT ASSISTANT EA 15: HCPCS

## 2019-03-06 PROCEDURE — 3331090002 HH PPS REVENUE DEBIT

## 2019-03-06 PROCEDURE — 3331090001 HH PPS REVENUE CREDIT

## 2019-03-07 PROCEDURE — 3331090002 HH PPS REVENUE DEBIT

## 2019-03-07 PROCEDURE — 3331090001 HH PPS REVENUE CREDIT

## 2019-03-08 ENCOUNTER — HOME CARE VISIT (OUTPATIENT)
Dept: SCHEDULING | Facility: HOME HEALTH | Age: 69
End: 2019-03-08
Payer: MEDICARE

## 2019-03-08 VITALS
HEART RATE: 78 BPM | DIASTOLIC BLOOD PRESSURE: 70 MMHG | SYSTOLIC BLOOD PRESSURE: 128 MMHG | RESPIRATION RATE: 19 BRPM | TEMPERATURE: 97.4 F

## 2019-03-08 VITALS
SYSTOLIC BLOOD PRESSURE: 100 MMHG | HEART RATE: 69 BPM | TEMPERATURE: 98 F | OXYGEN SATURATION: 96 % | DIASTOLIC BLOOD PRESSURE: 59 MMHG | RESPIRATION RATE: 16 BRPM

## 2019-03-08 PROCEDURE — G0158 HHC OT ASSISTANT EA 15: HCPCS

## 2019-03-08 PROCEDURE — 3331090001 HH PPS REVENUE CREDIT

## 2019-03-08 PROCEDURE — 3331090002 HH PPS REVENUE DEBIT

## 2019-03-08 PROCEDURE — G0157 HHC PT ASSISTANT EA 15: HCPCS

## 2019-03-09 PROCEDURE — 3331090002 HH PPS REVENUE DEBIT

## 2019-03-09 PROCEDURE — 3331090001 HH PPS REVENUE CREDIT

## 2019-03-10 PROCEDURE — 3331090002 HH PPS REVENUE DEBIT

## 2019-03-10 PROCEDURE — 3331090001 HH PPS REVENUE CREDIT

## 2019-03-11 PROCEDURE — 3331090001 HH PPS REVENUE CREDIT

## 2019-03-11 PROCEDURE — 3331090002 HH PPS REVENUE DEBIT

## 2019-03-12 ENCOUNTER — HOME CARE VISIT (OUTPATIENT)
Dept: HOME HEALTH SERVICES | Facility: HOME HEALTH | Age: 69
End: 2019-03-12
Payer: MEDICARE

## 2019-03-12 ENCOUNTER — HOME CARE VISIT (OUTPATIENT)
Dept: SCHEDULING | Facility: HOME HEALTH | Age: 69
End: 2019-03-12
Payer: MEDICARE

## 2019-03-12 VITALS
OXYGEN SATURATION: 97 % | RESPIRATION RATE: 18 BRPM | TEMPERATURE: 98 F | SYSTOLIC BLOOD PRESSURE: 140 MMHG | HEART RATE: 69 BPM | DIASTOLIC BLOOD PRESSURE: 80 MMHG

## 2019-03-12 VITALS
DIASTOLIC BLOOD PRESSURE: 71 MMHG | OXYGEN SATURATION: 96 % | HEART RATE: 76 BPM | SYSTOLIC BLOOD PRESSURE: 134 MMHG | TEMPERATURE: 97.9 F | RESPIRATION RATE: 17 BRPM

## 2019-03-12 PROCEDURE — 3331090002 HH PPS REVENUE DEBIT

## 2019-03-12 PROCEDURE — G0157 HHC PT ASSISTANT EA 15: HCPCS

## 2019-03-12 PROCEDURE — 3331090001 HH PPS REVENUE CREDIT

## 2019-03-12 PROCEDURE — G0158 HHC OT ASSISTANT EA 15: HCPCS

## 2019-03-13 PROCEDURE — 3331090002 HH PPS REVENUE DEBIT

## 2019-03-13 PROCEDURE — 3331090001 HH PPS REVENUE CREDIT

## 2019-03-14 ENCOUNTER — APPOINTMENT (OUTPATIENT)
Dept: CT IMAGING | Age: 69
End: 2019-03-14
Attending: EMERGENCY MEDICINE
Payer: MEDICARE

## 2019-03-14 ENCOUNTER — APPOINTMENT (OUTPATIENT)
Dept: GENERAL RADIOLOGY | Age: 69
End: 2019-03-14
Attending: EMERGENCY MEDICINE
Payer: MEDICARE

## 2019-03-14 ENCOUNTER — APPOINTMENT (OUTPATIENT)
Dept: MRI IMAGING | Age: 69
End: 2019-03-14
Attending: INTERNAL MEDICINE
Payer: MEDICARE

## 2019-03-14 ENCOUNTER — HOSPITAL ENCOUNTER (OUTPATIENT)
Age: 69
Setting detail: OBSERVATION
Discharge: NURSING, REHAB OR LONG-TERM CARE | End: 2019-03-20
Attending: EMERGENCY MEDICINE | Admitting: INTERNAL MEDICINE
Payer: MEDICARE

## 2019-03-14 ENCOUNTER — HOME CARE VISIT (OUTPATIENT)
Dept: SCHEDULING | Facility: HOME HEALTH | Age: 69
End: 2019-03-14
Payer: MEDICARE

## 2019-03-14 DIAGNOSIS — R53.1 GENERALIZED WEAKNESS: Primary | ICD-10-CM

## 2019-03-14 DIAGNOSIS — E03.9 HYPOTHYROIDISM, UNSPECIFIED TYPE: ICD-10-CM

## 2019-03-14 PROBLEM — I48.91 ATRIAL FIBRILLATION (HCC): Status: ACTIVE | Noted: 2019-03-14

## 2019-03-14 LAB
ALBUMIN SERPL-MCNC: 3.1 G/DL (ref 3.2–4.6)
ALBUMIN/GLOB SERPL: 0.6 {RATIO} (ref 1.2–3.5)
ALP SERPL-CCNC: 79 U/L (ref 50–136)
ALT SERPL-CCNC: 15 U/L (ref 12–65)
ANION GAP SERPL CALC-SCNC: 7 MMOL/L (ref 7–16)
AST SERPL-CCNC: 21 U/L (ref 15–37)
ATRIAL RATE: 214 BPM
BASOPHILS # BLD: 0 K/UL (ref 0–0.2)
BASOPHILS NFR BLD: 1 % (ref 0–2)
BILIRUB SERPL-MCNC: 0.5 MG/DL (ref 0.2–1.1)
BNP SERPL-MCNC: 36 PG/ML
BUN SERPL-MCNC: 11 MG/DL (ref 8–23)
CALCIUM SERPL-MCNC: 9.3 MG/DL (ref 8.3–10.4)
CALCULATED R AXIS, ECG10: 31 DEGREES
CALCULATED T AXIS, ECG11: 13 DEGREES
CHLORIDE SERPL-SCNC: 106 MMOL/L (ref 98–107)
CO2 SERPL-SCNC: 32 MMOL/L (ref 21–32)
CREAT SERPL-MCNC: 1.09 MG/DL (ref 0.8–1.5)
CRP SERPL-MCNC: 0.4 MG/DL (ref 0–0.9)
DIAGNOSIS, 93000: NORMAL
DIFFERENTIAL METHOD BLD: ABNORMAL
EOSINOPHIL # BLD: 0.2 K/UL (ref 0–0.8)
EOSINOPHIL NFR BLD: 3 % (ref 0.5–7.8)
ERYTHROCYTE [DISTWIDTH] IN BLOOD BY AUTOMATED COUNT: 15.1 % (ref 11.9–14.6)
ERYTHROCYTE [SEDIMENTATION RATE] IN BLOOD: 41 MM/HR (ref 0–20)
FLUAV AG NPH QL IA: NEGATIVE
FLUBV AG NPH QL IA: NEGATIVE
GLOBULIN SER CALC-MCNC: 4.9 G/DL (ref 2.3–3.5)
GLUCOSE BLD STRIP.AUTO-MCNC: 138 MG/DL (ref 65–100)
GLUCOSE SERPL-MCNC: 102 MG/DL (ref 65–100)
HCT VFR BLD AUTO: 42.3 % (ref 41.1–50.3)
HGB BLD-MCNC: 13.7 G/DL (ref 13.6–17.2)
IMM GRANULOCYTES # BLD AUTO: 0 K/UL (ref 0–0.5)
IMM GRANULOCYTES NFR BLD AUTO: 1 % (ref 0–5)
LYMPHOCYTES # BLD: 1.6 K/UL (ref 0.5–4.6)
LYMPHOCYTES NFR BLD: 29 % (ref 13–44)
MAGNESIUM SERPL-MCNC: 2 MG/DL (ref 1.8–2.4)
MCH RBC QN AUTO: 30.4 PG (ref 26.1–32.9)
MCHC RBC AUTO-ENTMCNC: 32.4 G/DL (ref 31.4–35)
MCV RBC AUTO: 93.8 FL (ref 79.6–97.8)
MONOCYTES # BLD: 0.5 K/UL (ref 0.1–1.3)
MONOCYTES NFR BLD: 10 % (ref 4–12)
NEUTS SEG # BLD: 3.2 K/UL (ref 1.7–8.2)
NEUTS SEG NFR BLD: 57 % (ref 43–78)
NRBC # BLD: 0 K/UL (ref 0–0.2)
PLATELET # BLD AUTO: 183 K/UL (ref 150–450)
PMV BLD AUTO: 11.6 FL (ref 9.4–12.3)
POTASSIUM SERPL-SCNC: 3.9 MMOL/L (ref 3.5–5.1)
PROT SERPL-MCNC: 8 G/DL (ref 6.3–8.2)
Q-T INTERVAL, ECG07: 448 MS
QRS DURATION, ECG06: 94 MS
QTC CALCULATION (BEZET), ECG08: 480 MS
RBC # BLD AUTO: 4.51 M/UL (ref 4.23–5.6)
SODIUM SERPL-SCNC: 145 MMOL/L (ref 136–145)
SPECIMEN SOURCE: NORMAL
T4 FREE SERPL-MCNC: 1.4 NG/DL (ref 0.78–1.46)
TROPONIN I BLD-MCNC: 0.01 NG/ML (ref 0.02–0.05)
TSH SERPL DL<=0.005 MIU/L-ACNC: 5.81 UIU/ML (ref 0.36–3.74)
VENTRICULAR RATE, ECG03: 69 BPM
WBC # BLD AUTO: 5.6 K/UL (ref 4.3–11.1)

## 2019-03-14 PROCEDURE — 99285 EMERGENCY DEPT VISIT HI MDM: CPT | Performed by: EMERGENCY MEDICINE

## 2019-03-14 PROCEDURE — 36415 COLL VENOUS BLD VENIPUNCTURE: CPT

## 2019-03-14 PROCEDURE — 82962 GLUCOSE BLOOD TEST: CPT

## 2019-03-14 PROCEDURE — 86140 C-REACTIVE PROTEIN: CPT

## 2019-03-14 PROCEDURE — 3331090001 HH PPS REVENUE CREDIT

## 2019-03-14 PROCEDURE — 84439 ASSAY OF FREE THYROXINE: CPT

## 2019-03-14 PROCEDURE — 99218 HC RM OBSERVATION: CPT

## 2019-03-14 PROCEDURE — 74011250637 HC RX REV CODE- 250/637: Performed by: FAMILY MEDICINE

## 2019-03-14 PROCEDURE — 80053 COMPREHEN METABOLIC PANEL: CPT

## 2019-03-14 PROCEDURE — 85025 COMPLETE CBC W/AUTO DIFF WBC: CPT

## 2019-03-14 PROCEDURE — 83880 ASSAY OF NATRIURETIC PEPTIDE: CPT

## 2019-03-14 PROCEDURE — 85652 RBC SED RATE AUTOMATED: CPT

## 2019-03-14 PROCEDURE — 86580 TB INTRADERMAL TEST: CPT | Performed by: INTERNAL MEDICINE

## 2019-03-14 PROCEDURE — 77030005515 HC CATH URETH FOL14 BARD -B

## 2019-03-14 PROCEDURE — 93005 ELECTROCARDIOGRAM TRACING: CPT | Performed by: EMERGENCY MEDICINE

## 2019-03-14 PROCEDURE — 74011250636 HC RX REV CODE- 250/636: Performed by: INTERNAL MEDICINE

## 2019-03-14 PROCEDURE — 87804 INFLUENZA ASSAY W/OPTIC: CPT

## 2019-03-14 PROCEDURE — 84443 ASSAY THYROID STIM HORMONE: CPT

## 2019-03-14 PROCEDURE — 70450 CT HEAD/BRAIN W/O DYE: CPT

## 2019-03-14 PROCEDURE — 83735 ASSAY OF MAGNESIUM: CPT

## 2019-03-14 PROCEDURE — 51701 INSERT BLADDER CATHETER: CPT | Performed by: EMERGENCY MEDICINE

## 2019-03-14 PROCEDURE — 71045 X-RAY EXAM CHEST 1 VIEW: CPT

## 2019-03-14 PROCEDURE — 74011000302 HC RX REV CODE- 302: Performed by: INTERNAL MEDICINE

## 2019-03-14 PROCEDURE — 84484 ASSAY OF TROPONIN QUANT: CPT

## 2019-03-14 PROCEDURE — 3331090002 HH PPS REVENUE DEBIT

## 2019-03-14 PROCEDURE — 81003 URINALYSIS AUTO W/O SCOPE: CPT | Performed by: EMERGENCY MEDICINE

## 2019-03-14 PROCEDURE — 74011250637 HC RX REV CODE- 250/637: Performed by: INTERNAL MEDICINE

## 2019-03-14 RX ORDER — OXYCODONE AND ACETAMINOPHEN 10; 325 MG/1; MG/1
1 TABLET ORAL ONCE
Status: COMPLETED | OUTPATIENT
Start: 2019-03-14 | End: 2019-03-14

## 2019-03-14 RX ORDER — INSULIN LISPRO 100 [IU]/ML
INJECTION, SOLUTION INTRAVENOUS; SUBCUTANEOUS
Status: DISCONTINUED | OUTPATIENT
Start: 2019-03-14 | End: 2019-03-20 | Stop reason: HOSPADM

## 2019-03-14 RX ORDER — AMLODIPINE BESYLATE 10 MG/1
5 TABLET ORAL DAILY
Status: DISCONTINUED | OUTPATIENT
Start: 2019-03-15 | End: 2019-03-14

## 2019-03-14 RX ORDER — LOSARTAN POTASSIUM 50 MG/1
100 TABLET ORAL DAILY
Status: DISCONTINUED | OUTPATIENT
Start: 2019-03-15 | End: 2019-03-14

## 2019-03-14 RX ORDER — ONDANSETRON 2 MG/ML
4 INJECTION INTRAMUSCULAR; INTRAVENOUS
Status: DISCONTINUED | OUTPATIENT
Start: 2019-03-14 | End: 2019-03-20 | Stop reason: HOSPADM

## 2019-03-14 RX ORDER — HYDRALAZINE HYDROCHLORIDE 20 MG/ML
20 INJECTION INTRAMUSCULAR; INTRAVENOUS
Status: COMPLETED | OUTPATIENT
Start: 2019-03-14 | End: 2019-03-14

## 2019-03-14 RX ORDER — ISOSORBIDE MONONITRATE 30 MG/1
30 TABLET, EXTENDED RELEASE ORAL DAILY
Status: DISCONTINUED | OUTPATIENT
Start: 2019-03-14 | End: 2019-03-20 | Stop reason: HOSPADM

## 2019-03-14 RX ORDER — GABAPENTIN 300 MG/1
300 CAPSULE ORAL 3 TIMES DAILY
Status: DISCONTINUED | OUTPATIENT
Start: 2019-03-14 | End: 2019-03-15

## 2019-03-14 RX ORDER — TAMSULOSIN HYDROCHLORIDE 0.4 MG/1
0.4 CAPSULE ORAL DAILY
Status: DISCONTINUED | OUTPATIENT
Start: 2019-03-15 | End: 2019-03-20 | Stop reason: HOSPADM

## 2019-03-14 RX ORDER — CLOPIDOGREL BISULFATE 75 MG/1
75 TABLET ORAL
Status: DISCONTINUED | OUTPATIENT
Start: 2019-03-15 | End: 2019-03-20 | Stop reason: HOSPADM

## 2019-03-14 RX ORDER — PANTOPRAZOLE SODIUM 40 MG/1
40 TABLET, DELAYED RELEASE ORAL DAILY
Status: DISCONTINUED | OUTPATIENT
Start: 2019-03-15 | End: 2019-03-20 | Stop reason: HOSPADM

## 2019-03-14 RX ORDER — LEVOTHYROXINE SODIUM 50 UG/1
50 TABLET ORAL
Status: DISCONTINUED | OUTPATIENT
Start: 2019-03-15 | End: 2019-03-20 | Stop reason: HOSPADM

## 2019-03-14 RX ORDER — SODIUM CHLORIDE 0.9 % (FLUSH) 0.9 %
5-40 SYRINGE (ML) INJECTION AS NEEDED
Status: DISCONTINUED | OUTPATIENT
Start: 2019-03-14 | End: 2019-03-20 | Stop reason: HOSPADM

## 2019-03-14 RX ORDER — SODIUM CHLORIDE 9 MG/ML
75 INJECTION, SOLUTION INTRAVENOUS CONTINUOUS
Status: DISCONTINUED | OUTPATIENT
Start: 2019-03-14 | End: 2019-03-15

## 2019-03-14 RX ORDER — GALANTAMINE 4 MG/1
8 TABLET, FILM COATED ORAL 2 TIMES DAILY
Status: DISCONTINUED | OUTPATIENT
Start: 2019-03-14 | End: 2019-03-20 | Stop reason: HOSPADM

## 2019-03-14 RX ORDER — HYDRALAZINE HYDROCHLORIDE 25 MG/1
25 TABLET, FILM COATED ORAL 3 TIMES DAILY
Status: DISCONTINUED | OUTPATIENT
Start: 2019-03-14 | End: 2019-03-15

## 2019-03-14 RX ORDER — AMLODIPINE BESYLATE 5 MG/1
5 TABLET ORAL DAILY
Status: DISCONTINUED | OUTPATIENT
Start: 2019-03-14 | End: 2019-03-15

## 2019-03-14 RX ORDER — HYDRALAZINE HYDROCHLORIDE 25 MG/1
25 TABLET, FILM COATED ORAL 3 TIMES DAILY
Status: DISCONTINUED | OUTPATIENT
Start: 2019-03-14 | End: 2019-03-14

## 2019-03-14 RX ORDER — METOPROLOL SUCCINATE 50 MG/1
50 TABLET, EXTENDED RELEASE ORAL DAILY
Status: DISCONTINUED | OUTPATIENT
Start: 2019-03-14 | End: 2019-03-20 | Stop reason: HOSPADM

## 2019-03-14 RX ORDER — FUROSEMIDE 20 MG/1
20 TABLET ORAL DAILY
Status: DISCONTINUED | OUTPATIENT
Start: 2019-03-15 | End: 2019-03-20 | Stop reason: HOSPADM

## 2019-03-14 RX ORDER — LANOLIN ALCOHOL/MO/W.PET/CERES
1000 CREAM (GRAM) TOPICAL DAILY
Status: DISCONTINUED | OUTPATIENT
Start: 2019-03-15 | End: 2019-03-20 | Stop reason: HOSPADM

## 2019-03-14 RX ORDER — RISPERIDONE 1 MG/1
1 TABLET, FILM COATED ORAL DAILY
Status: DISCONTINUED | OUTPATIENT
Start: 2019-03-15 | End: 2019-03-20 | Stop reason: HOSPADM

## 2019-03-14 RX ORDER — LOSARTAN POTASSIUM 50 MG/1
100 TABLET ORAL DAILY
Status: DISCONTINUED | OUTPATIENT
Start: 2019-03-14 | End: 2019-03-20 | Stop reason: HOSPADM

## 2019-03-14 RX ORDER — SODIUM CHLORIDE 0.9 % (FLUSH) 0.9 %
5-40 SYRINGE (ML) INJECTION EVERY 8 HOURS
Status: DISCONTINUED | OUTPATIENT
Start: 2019-03-14 | End: 2019-03-20 | Stop reason: HOSPADM

## 2019-03-14 RX ORDER — ISOSORBIDE MONONITRATE 30 MG/1
30 TABLET, EXTENDED RELEASE ORAL DAILY
Status: DISCONTINUED | OUTPATIENT
Start: 2019-03-15 | End: 2019-03-14

## 2019-03-14 RX ORDER — AMIODARONE HYDROCHLORIDE 200 MG/1
100 TABLET ORAL DAILY
Status: DISCONTINUED | OUTPATIENT
Start: 2019-03-15 | End: 2019-03-20 | Stop reason: HOSPADM

## 2019-03-14 RX ORDER — METOPROLOL SUCCINATE 50 MG/1
50 TABLET, EXTENDED RELEASE ORAL DAILY
Status: DISCONTINUED | OUTPATIENT
Start: 2019-03-15 | End: 2019-03-14

## 2019-03-14 RX ORDER — ESCITALOPRAM OXALATE 10 MG/1
15 TABLET ORAL DAILY
Status: DISCONTINUED | OUTPATIENT
Start: 2019-03-15 | End: 2019-03-20 | Stop reason: HOSPADM

## 2019-03-14 RX ORDER — ACETAMINOPHEN 325 MG/1
650 TABLET ORAL
Status: DISCONTINUED | OUTPATIENT
Start: 2019-03-14 | End: 2019-03-19

## 2019-03-14 RX ADMIN — TUBERCULIN PURIFIED PROTEIN DERIVATIVE 5 UNITS: 5 INJECTION, SOLUTION INTRADERMAL at 22:10

## 2019-03-14 RX ADMIN — APIXABAN 5 MG: 5 TABLET, FILM COATED ORAL at 20:41

## 2019-03-14 RX ADMIN — HYDRALAZINE HYDROCHLORIDE 25 MG: 25 TABLET, FILM COATED ORAL at 20:41

## 2019-03-14 RX ADMIN — SODIUM CHLORIDE 75 ML/HR: 900 INJECTION, SOLUTION INTRAVENOUS at 20:45

## 2019-03-14 RX ADMIN — AMLODIPINE BESYLATE 5 MG: 5 TABLET ORAL at 20:41

## 2019-03-14 RX ADMIN — Medication 10 ML: at 21:51

## 2019-03-14 RX ADMIN — HYDRALAZINE HYDROCHLORIDE 20 MG: 20 INJECTION INTRAMUSCULAR; INTRAVENOUS at 17:37

## 2019-03-14 RX ADMIN — METOPROLOL SUCCINATE 50 MG: 50 TABLET, EXTENDED RELEASE ORAL at 20:41

## 2019-03-14 RX ADMIN — GABAPENTIN 300 MG: 300 CAPSULE ORAL at 20:41

## 2019-03-14 RX ADMIN — LOSARTAN POTASSIUM 100 MG: 50 TABLET ORAL at 20:41

## 2019-03-14 RX ADMIN — OXYCODONE AND ACETAMINOPHEN 1 TABLET: 10; 325 TABLET ORAL at 21:03

## 2019-03-14 RX ADMIN — GALANTAMINE 8 MG: 4 TABLET, FILM COATED ORAL at 20:41

## 2019-03-14 RX ADMIN — ISOSORBIDE MONONITRATE 30 MG: 30 TABLET, EXTENDED RELEASE ORAL at 20:41

## 2019-03-14 NOTE — PROGRESS NOTES
This RN spoke with Dr. Yaz Hand in regards to repeat BP. MD is aware. Verbal order to release sign and held medications. Per MD, pt is to go to assigned room in hospital and to receive scheduled medications at that time since patient is asymptomatic.

## 2019-03-14 NOTE — ED NOTES
Called and spoke with Dr. Mary Souza about pt's pressure 206/101. Verbal order given for hydralazine 20mg ivp once.

## 2019-03-14 NOTE — PROGRESS NOTES
Pt has MRI compatible [employer] pacemaker. Pt will be scanned at 12:30pm tomorrow. Informed RN of needing consent form and of scan time tomorrow.
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
## 2019-03-14 NOTE — ED NOTES
Pt arrives from home via EMS for increasing weakness since Sunday. Pt had CVA recently, discharged from rehab in february. Had a pacemaker placed 5 weeks ago. Pt was unable to get out of bed and walk today, which is unusual for patient (uses walker). States some slight shob, 99% RA. . Pt is hard of hearing.
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
## 2019-03-14 NOTE — ED NOTES
TRANSFER - OUT REPORT:    Verbal report given to Rhett Liu RN on Laurie Cardenas  being transferred to 5th floor for routine progression of care       Report consisted of patients Situation, Background, Assessment and   Recommendations(SBAR). Information from the following report(s) ED Summary was reviewed with the receiving nurse. Lines:   Peripheral IV 03/14/19 Left Antecubital (Active)   Site Assessment Clean, dry, & intact 3/14/2019 11:10 AM   Phlebitis Assessment 0 3/14/2019 11:10 AM   Infiltration Assessment 0 3/14/2019 11:10 AM   Dressing Status Clean, dry, & intact 3/14/2019 11:10 AM   Dressing Type Tape;Transparent 3/14/2019 11:10 AM   Hub Color/Line Status Green;Flushed 3/14/2019 11:10 AM        Opportunity for questions and clarification was provided.       Patient transported with:   [employer]

## 2019-03-15 ENCOUNTER — APPOINTMENT (OUTPATIENT)
Dept: MRI IMAGING | Age: 69
End: 2019-03-15
Attending: INTERNAL MEDICINE
Payer: MEDICARE

## 2019-03-15 LAB
ANION GAP SERPL CALC-SCNC: 8 MMOL/L (ref 7–16)
BASOPHILS # BLD: 0 K/UL (ref 0–0.2)
BASOPHILS NFR BLD: 1 % (ref 0–2)
BUN SERPL-MCNC: 10 MG/DL (ref 8–23)
CALCIUM SERPL-MCNC: 8.5 MG/DL (ref 8.3–10.4)
CHLORIDE SERPL-SCNC: 107 MMOL/L (ref 98–107)
CO2 SERPL-SCNC: 28 MMOL/L (ref 21–32)
CREAT SERPL-MCNC: 0.85 MG/DL (ref 0.8–1.5)
DIFFERENTIAL METHOD BLD: ABNORMAL
EOSINOPHIL # BLD: 0.1 K/UL (ref 0–0.8)
EOSINOPHIL NFR BLD: 3 % (ref 0.5–7.8)
ERYTHROCYTE [DISTWIDTH] IN BLOOD BY AUTOMATED COUNT: 14.8 % (ref 11.9–14.6)
GLUCOSE BLD STRIP.AUTO-MCNC: 150 MG/DL (ref 65–100)
GLUCOSE BLD STRIP.AUTO-MCNC: 216 MG/DL (ref 65–100)
GLUCOSE BLD STRIP.AUTO-MCNC: 241 MG/DL (ref 65–100)
GLUCOSE BLD STRIP.AUTO-MCNC: 259 MG/DL (ref 65–100)
GLUCOSE SERPL-MCNC: 122 MG/DL (ref 65–100)
HCT VFR BLD AUTO: 39.4 % (ref 41.1–50.3)
HGB BLD-MCNC: 12 G/DL (ref 13.6–17.2)
IMM GRANULOCYTES # BLD AUTO: 0 K/UL (ref 0–0.5)
IMM GRANULOCYTES NFR BLD AUTO: 0 % (ref 0–5)
LYMPHOCYTES # BLD: 1.8 K/UL (ref 0.5–4.6)
LYMPHOCYTES NFR BLD: 36 % (ref 13–44)
MCH RBC QN AUTO: 31.9 PG (ref 26.1–32.9)
MCHC RBC AUTO-ENTMCNC: 30.5 G/DL (ref 31.4–35)
MCV RBC AUTO: 104.8 FL (ref 79.6–97.8)
MM INDURATION POC: 0 MM (ref 0–5)
MONOCYTES # BLD: 0.6 K/UL (ref 0.1–1.3)
MONOCYTES NFR BLD: 11 % (ref 4–12)
NEUTS SEG # BLD: 2.5 K/UL (ref 1.7–8.2)
NEUTS SEG NFR BLD: 50 % (ref 43–78)
NRBC # BLD: 0 K/UL (ref 0–0.2)
PLATELET # BLD AUTO: 132 K/UL (ref 150–450)
PMV BLD AUTO: 11.2 FL (ref 9.4–12.3)
POTASSIUM SERPL-SCNC: 3.5 MMOL/L (ref 3.5–5.1)
PPD POC: NORMAL NEGATIVE
RBC # BLD AUTO: 3.76 M/UL (ref 4.23–5.6)
SODIUM SERPL-SCNC: 143 MMOL/L (ref 136–145)
WBC # BLD AUTO: 5 K/UL (ref 4.3–11.1)

## 2019-03-15 PROCEDURE — 74011250636 HC RX REV CODE- 250/636: Performed by: HOSPITALIST

## 2019-03-15 PROCEDURE — 82962 GLUCOSE BLOOD TEST: CPT

## 2019-03-15 PROCEDURE — 36415 COLL VENOUS BLD VENIPUNCTURE: CPT

## 2019-03-15 PROCEDURE — 85025 COMPLETE CBC W/AUTO DIFF WBC: CPT

## 2019-03-15 PROCEDURE — 3331090001 HH PPS REVENUE CREDIT

## 2019-03-15 PROCEDURE — 74011250637 HC RX REV CODE- 250/637: Performed by: INTERNAL MEDICINE

## 2019-03-15 PROCEDURE — 3331090002 HH PPS REVENUE DEBIT

## 2019-03-15 PROCEDURE — G8987 SELF CARE CURRENT STATUS: HCPCS

## 2019-03-15 PROCEDURE — 80048 BASIC METABOLIC PNL TOTAL CA: CPT

## 2019-03-15 PROCEDURE — 97161 PT EVAL LOW COMPLEX 20 MIN: CPT

## 2019-03-15 PROCEDURE — 70551 MRI BRAIN STEM W/O DYE: CPT

## 2019-03-15 PROCEDURE — 99218 HC RM OBSERVATION: CPT

## 2019-03-15 PROCEDURE — 97166 OT EVAL MOD COMPLEX 45 MIN: CPT

## 2019-03-15 PROCEDURE — G8988 SELF CARE GOAL STATUS: HCPCS

## 2019-03-15 PROCEDURE — 74011636637 HC RX REV CODE- 636/637: Performed by: INTERNAL MEDICINE

## 2019-03-15 RX ORDER — HYDRALAZINE HYDROCHLORIDE 50 MG/1
50 TABLET, FILM COATED ORAL 3 TIMES DAILY
Status: DISCONTINUED | OUTPATIENT
Start: 2019-03-15 | End: 2019-03-20 | Stop reason: HOSPADM

## 2019-03-15 RX ORDER — HYDRALAZINE HYDROCHLORIDE 20 MG/ML
10 INJECTION INTRAMUSCULAR; INTRAVENOUS
Status: DISCONTINUED | OUTPATIENT
Start: 2019-03-15 | End: 2019-03-20 | Stop reason: HOSPADM

## 2019-03-15 RX ORDER — CLONIDINE HYDROCHLORIDE 0.1 MG/1
0.2 TABLET ORAL
Status: DISCONTINUED | OUTPATIENT
Start: 2019-03-15 | End: 2019-03-19

## 2019-03-15 RX ORDER — AMLODIPINE BESYLATE 10 MG/1
10 TABLET ORAL DAILY
Status: DISCONTINUED | OUTPATIENT
Start: 2019-03-15 | End: 2019-03-20 | Stop reason: HOSPADM

## 2019-03-15 RX ORDER — GABAPENTIN 100 MG/1
100 CAPSULE ORAL 3 TIMES DAILY
Status: DISCONTINUED | OUTPATIENT
Start: 2019-03-15 | End: 2019-03-20 | Stop reason: HOSPADM

## 2019-03-15 RX ADMIN — METOPROLOL SUCCINATE 50 MG: 50 TABLET, EXTENDED RELEASE ORAL at 06:15

## 2019-03-15 RX ADMIN — APIXABAN 5 MG: 5 TABLET, FILM COATED ORAL at 21:28

## 2019-03-15 RX ADMIN — GABAPENTIN 100 MG: 100 CAPSULE ORAL at 21:28

## 2019-03-15 RX ADMIN — INSULIN LISPRO 4 UNITS: 100 INJECTION, SOLUTION INTRAVENOUS; SUBCUTANEOUS at 21:27

## 2019-03-15 RX ADMIN — INSULIN LISPRO 6 UNITS: 100 INJECTION, SOLUTION INTRAVENOUS; SUBCUTANEOUS at 17:55

## 2019-03-15 RX ADMIN — HYDRALAZINE HYDROCHLORIDE 25 MG: 50 TABLET, FILM COATED ORAL at 08:37

## 2019-03-15 RX ADMIN — HYDRALAZINE HYDROCHLORIDE 25 MG: 25 TABLET, FILM COATED ORAL at 06:14

## 2019-03-15 RX ADMIN — RISPERIDONE 1 MG: 1 TABLET ORAL at 06:14

## 2019-03-15 RX ADMIN — GABAPENTIN 300 MG: 300 CAPSULE ORAL at 06:13

## 2019-03-15 RX ADMIN — AMLODIPINE BESYLATE 5 MG: 5 TABLET ORAL at 06:14

## 2019-03-15 RX ADMIN — AMLODIPINE BESYLATE 5 MG: 10 TABLET ORAL at 08:37

## 2019-03-15 RX ADMIN — INSULIN LISPRO 4 UNITS: 100 INJECTION, SOLUTION INTRAVENOUS; SUBCUTANEOUS at 11:52

## 2019-03-15 RX ADMIN — GALANTAMINE 8 MG: 4 TABLET, FILM COATED ORAL at 17:55

## 2019-03-15 RX ADMIN — GALANTAMINE 8 MG: 4 TABLET, FILM COATED ORAL at 06:13

## 2019-03-15 RX ADMIN — ESCITALOPRAM OXALATE 15 MG: 10 TABLET ORAL at 06:15

## 2019-03-15 RX ADMIN — FUROSEMIDE 20 MG: 20 TABLET ORAL at 06:15

## 2019-03-15 RX ADMIN — PANTOPRAZOLE SODIUM 40 MG: 40 TABLET, DELAYED RELEASE ORAL at 06:16

## 2019-03-15 RX ADMIN — CYANOCOBALAMIN TAB 1000 MCG 1000 MCG: 1000 TAB at 06:15

## 2019-03-15 RX ADMIN — HYDRALAZINE HYDROCHLORIDE 50 MG: 50 TABLET, FILM COATED ORAL at 21:28

## 2019-03-15 RX ADMIN — HYDRALAZINE HYDROCHLORIDE 10 MG: 20 INJECTION INTRAMUSCULAR; INTRAVENOUS at 23:41

## 2019-03-15 RX ADMIN — AMIODARONE HYDROCHLORIDE 100 MG: 200 TABLET ORAL at 06:16

## 2019-03-15 RX ADMIN — ACETAMINOPHEN 650 MG: 325 TABLET, FILM COATED ORAL at 07:52

## 2019-03-15 RX ADMIN — HYDRALAZINE HYDROCHLORIDE 50 MG: 50 TABLET, FILM COATED ORAL at 15:17

## 2019-03-15 RX ADMIN — Medication 10 ML: at 05:55

## 2019-03-15 RX ADMIN — ACETAMINOPHEN 650 MG: 325 TABLET, FILM COATED ORAL at 15:20

## 2019-03-15 RX ADMIN — CLOPIDOGREL BISULFATE 75 MG: 75 TABLET, FILM COATED ORAL at 06:14

## 2019-03-15 RX ADMIN — LEVOTHYROXINE SODIUM 50 MCG: 50 TABLET ORAL at 06:13

## 2019-03-15 RX ADMIN — Medication 10 ML: at 21:27

## 2019-03-15 RX ADMIN — TAMSULOSIN HYDROCHLORIDE 0.4 MG: 0.4 CAPSULE ORAL at 06:16

## 2019-03-15 RX ADMIN — GABAPENTIN 100 MG: 100 CAPSULE ORAL at 15:17

## 2019-03-15 RX ADMIN — APIXABAN 5 MG: 5 TABLET, FILM COATED ORAL at 06:15

## 2019-03-15 RX ADMIN — INSULIN LISPRO 2 UNITS: 100 INJECTION, SOLUTION INTRAVENOUS; SUBCUTANEOUS at 07:52

## 2019-03-15 NOTE — PROGRESS NOTES
END OF SHIFT NOTE:    Intake/Output  No intake/output data recorded. Voiding: YES  Catheter: NO  Drain:              Stool:  0 occurrences. Emesis:  0 occurrences. VITAL SIGNS  Patient Vitals for the past 12 hrs:   Temp Pulse Resp BP SpO2   03/15/19 0606    (!) 192/98    03/15/19 0159 98.1 °F (36.7 °C) (!) 59 18 180/84 97 %   03/14/19 2325 98.1 °F (36.7 °C) 67 18 176/84 95 %   03/14/19 2136    190/86    03/14/19 1950 98.3 °F (36.8 °C) 85 19 200/88 97 %       Pain Assessment  Pain 1  Pain Scale 1: Numeric (0 - 10) (03/15/19 0220)  Pain Intensity 1: 0 (03/15/19 0220)  Patient Stated Pain Goal: 0 (03/15/19 0220)  Pain Reassessment 1: Yes (03/14/19 2136)  Pain Onset 1: chronic (03/14/19 2000)  Pain Location 1: Back;Neck (03/14/19 2000)  Pain Orientation 1: Lower (03/14/19 2000)  Pain Description 1: Aching (03/14/19 2000)  Pain Intervention(s) 1: Medication (see MAR) (03/14/19 2000)    Ambulating  No    Additional Information: Patient rested well. BPs remained high all night. Given 9am meds early due to increase in BP. Given pain medication once. Might need PRN pain medication prescribed. Shift report given to oncoming nurse at the bedside.     Rohit Cunningham

## 2019-03-15 NOTE — PROGRESS NOTES
03/14/19 2000   Dual Skin Pressure Injury Assessment   Dual Skin Pressure Injury Assessment WDL   Second Care Provider (Based on [address] [place] [address]) Luis Eduardo eLe RN   Patient had a scar on stomach and chest and an abrasion on lower left ankle. Otherwise no breakdown noted.

## 2019-03-15 NOTE — PROGRESS NOTES
Problem: Mobility Impaired (Adult and Pediatric)  Goal: *Acute Goals and Plan of Care (Insert Text)  LTG:  (1.)Mr. Bibi Hernandez will move from supine to sit and sit to supine , scoot up and down and roll side to side with CONTACT GUARD ASSIST within 7 treatment day(s). (2.)Mr. Bibi Hernandez will transfer from bed to chair and chair to bed with CONTACT GUARD ASSIST using the least restrictive device within 7 treatment day(s). (3.)Mr. Bibi Hernandez will ambulate with CONTACT GUARD ASSIST for 100 feet with the least restrictive device within 7 treatment day(s) while maintaining normal vital signs. (4.)Mr. Bibi Hernandez will perform 2 steps with MIN A within 7 treatment days for safety ascending and descending stairs for home.   _____________________________________________________________________________________________    PHYSICAL THERAPY: Initial Assessment and AM 3/15/2019  OBSERVATION:    Payor: Larisa Terrazas / Plan: [facility] MEDICARE CHOICE PPO/PFFS / Product Type: [employer] Medicare /       NAME/AGE/GENDER: Jenniffer Valadez is a 78 y.o. male   PRIMARY DIAGNOSIS: Generalized weakness [R53.1] Generalized weakness Generalized weakness       ICD-10: Treatment Diagnosis:    · Generalized Muscle Weakness (M62.81)  · Difficulty in walking, Not elsewhere classified (R26.2)   Precaution/Allergies:  Fosinopril and Lisinopril      ASSESSMENT:     Mr. Bibi Hernandez presents with decreased bed mobility, transfers, ambulation, balance, activity tolerance, strength, and overall general functional mobility s/p hospital admission with generalized weakness. Pt with recent CVA resulting in L sided weakness. Pt home from rehab s/p 2 weeks before this admission. Pt A & O x 3, very slow processing, requires additional time for questions and motor planning. Pt reports lives with spouse who has been assisting with ADLs. Pt states has required increased assist this past week with OOB and ambulation.  Pt reports uses RW for mobility, relates feels weaker than when he was at rehab. Pt today working with OT on arrival at [facility]. Pt performed transfers with MIN A x 2 for safety with bed elevated due to height. Pt CGA to MIN A bed to chair for 2' with RW, forward flexed posture, very slow roya. Pt cued for walker safety, posture, steppage. Pt most likely close to baseline level of activity, was receiving HHPT after rehab. PT to cont to follow for acute care needs. Discharge needs pending progress with mobility. This section established at most recent assessment   PROBLEM LIST (Impairments causing functional limitations):  1. Decreased Strength  2. Decreased ADL/Functional Activities  3. Decreased Transfer Abilities  4. Decreased Ambulation Ability/Technique  5. Decreased Balance  6. Decreased Activity Tolerance  7. Increased Fatigue  8. Decreased [place] with Home Exercise Program  9. Decreased Cognition   INTERVENTIONS PLANNED: (Benefits and precautions of physical therapy have been discussed with the patient.)  1. Balance Exercise  2. Bed Mobility  3. Family Education  4. Gait Training  5. Home Exercise Program (HEP)  6. Therapeutic Activites  7. Therapeutic Exercise/Strengthening  8. Transfer Training     TREATMENT PLAN: Frequency/Duration: 3 times a week for duration of hospital stay  Rehabilitation Potential For Stated Goals: [facility] REHABILITATION/EQUIPMENT: (at time of discharge pending progress): Due to the probability of continued deficits (see above) this patient will likely need continued skilled physical therapy after discharge. Equipment:    None at this time              HISTORY:   History of Present Injury/Illness (Reason for Referral):  Mr. Smith Joe is a 79 y/o male with a h/o BPH, CAD, HTN, DM, lumbar radiculopathy, TIA, AFib on Eliquis who presents with progressive weakness since this past Sunday. This has been a recurrent complaint. 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  He developed AFib and was started on BB, amiodarone and Eliquis. He was weak then but was discharged home only to return with c/o weakness. CT head and the rest of his w/u at that time was negative. He was discharged to rehab and was there about 3 weeks. He has been home almost two weeks but since this past Sunday has become weak again. Does have h/o CVA but no reported slurred speech or asymmetric weakness. Repeat head CT today shows chronic small vessel disease and unchanged right BG lacunar infarcts. He denies any N/V/D but has had poor PO intake. No major stressors or life changes noted but does appear weak and depressed. No fevers, chills or sick contacts that he's aware of. Unfortunately his wife is unable to provide for him like this at home. Past Medical History/Comorbidities:   Mr. Tomasz Velasco  has a past medical history of Abdominal complaints (12/18/2013), Arthritis, Asthma, BPH (benign prostatic hyperplasia), CAD (coronary artery disease), Chronic pain, COPD, Depression (emotion) (12/18/2013), Diabetes ([address] GERD (gastroesophageal reflux disease), Hypertension, Neuropathy, Neuropathy, Other ill-defined conditions(799.89), Post traumatic stress disorder (1/12/2012), PTSD (post-traumatic stress disorder), Seizures ([address] Stroke ([address] Thrombocytopenia, unspecified ([address] (1/12/2012), and Thyroid disease. Mr. Tomasz Velasco  has a past surgical history that includes hx heent (2010); hx other surgical (1967/68); hx orthopaedic; bone marrow aspirate &biopsy (1/19/2012); pr cardiac surg procedure unlist; hx heart catheterization (4/23/2015); hx back surgery; and pr neurological procedure unlisted.   Social History/Living Environment:   Home Environment: Private residence  # Steps to Enter: 2  Wheelchair Ramp: No  One/Two Story Residence: Two story, live on 1st floor  Lift Chair Available: No  Living Alone: No  Support Systems: Spouse/Significant Other/Partner  Patient Expects to be Discharged to[de-identified] Private residence  Current DME Used/Available at Home: Commode, bedside, Grab bars, Shower chair, Walker, rolling  Tub or Shower Type: Shower  Prior Level of Function/Work/Activity:  Lives with spouse, hx CVA, L side weakness; uses RW for amb, does not drive, assist with ADLs  Personal Factors:          Sex:  male        Age:  78 y.o. Overall Behavior:  agreeable   Number of Personal Factors/Comorbidities that affect the Plan of Care:  CVA, age, COPD, chronic pain 3+: HIGH COMPLEXITY   EXAMINATION:   Most Recent Physical Functioning:   Gross Assessment:  AROM: Generally decreased, functional(B LE)  Strength: Generally decreased, functional(B LE)  Coordination: Generally decreased, functional  Sensation: Intact(B LE to light touch)               Posture:  Posture (WDL): Exceptions to WDL  Posture Assessment: Forward head, Rounded shoulders  Balance:  Sitting: Impaired  Sitting - Static: Good (unsupported)  Sitting - Dynamic: Fair (occasional)  Standing: Impaired  Standing - Static: Fair  Standing - Dynamic : Fair Bed Mobility:     Wheelchair Mobility:     Transfers:  Sit to Stand: Minimum assistance(bed elevated, pt 6')  Stand to Sit: Contact guard assistance;Minimum assistance  Bed to Chair: Minimum assistance  Gait:     Base of Support: Center of gravity altered; Widened  Speed/Precious: Pace decreased (<100 feet/min); Slow  Step Length: Left shortened;Right shortened  Swing Pattern: Left symmetrical;Right symmetrical  Gait Abnormalities: Decreased step clearance;Shuffling gait; Steppage gait  Distance (ft): 2 Feet (ft)(bed to chair)  Assistive Device: Walker, rolling  Ambulation - Level of Assistance: Minimal assistance(additional time)  Interventions: Safety awareness training;Verbal cues      Body Structures Involved:  1. Muscles Body Functions Affected:  1. Movement Related Activities and Participation Affected:  1. General Tasks and Demands  2. Mobility  3.  Self Care   Number of elements that affect the Plan of Care: 4+: HIGH COMPLEXITY   CLINICAL PRESENTATION:   Presentation: Stable and uncomplicated: LOW COMPLEXITY   CLINICAL DECISION MAKIN[address] [place] [id number] AM-PAC 6 Clicks   Basic Mobility Inpatient Short Form  How much difficulty does the patient currently have. .. Unable A Lot A Little None   1. Turning over in bed (including adjusting bedclothes, sheets and blankets)? [] 1   [x] 2   [] 3   [] 4   2. Sitting down on and standing up from a chair with arms ( e.g., wheelchair, bedside commode, etc.)   [] 1   [x] 2   [] 3   [] 4   3. Moving from lying on back to sitting on the side of the bed? [] 1   [x] 2   [] 3   [] 4   How much help from another person does the patient currently need. .. Total A Lot A Little None   4. Moving to and from a bed to a chair (including a wheelchair)? [] 1   [] 2   [x] 3   [] 4   5. Need to walk in hospital room? [] 1   [] 2   [x] 3   [] 4   6. Climbing 3-5 steps with a railing? [] 1   [x] 2   [] 3   [] 4   © , Trustees of [address] [place] [address], under license to [employer]. All rights reserved      Score:  Initial: 14 Most Recent: X (Date: -- )    Interpretation of Tool:  Represents activities that are increasingly more difficult (i.e. Bed mobility, Transfers, Gait). Medical Necessity:     · Patient is expected to demonstrate progress in strength, range of motion, balance and coordination to decrease assistance required with overall functional mobility, transfers, ambulation. · Patient demonstrates good rehab potential due to higher previous functional level. Reason for Services/Other Comments:  · Patient continues to require present interventions due to patient's inability to perform bed mobility, transfers, ambulation safely and effectively.    Use of outcome tool(s) and clinical judgement create a POC that gives a: Questionable prediction of patient's progress: MODERATE COMPLEXITY            TREATMENT:   (In addition to Assessment/Re-Assessment sessions the following treatments were rendered) Pre-treatment Symptoms/Complaints:  \"I feel weaker\"  Pain: Initial:   Pain Intensity 1: 7(pt states chronic)  Pain Location 1: Neck, Back  Pain Intervention(s) 1: Repositioned  Post Session:  0/10 post session in chair     Assessment/Reassessment only, no treatment provided today    Braces/Orthotics/Lines/Etc:   · O2 Device: Room air  Treatment/Session Assessment:    · Response to Treatment:  Tolerated well, very slow mobility, additional time required  · Interdisciplinary Collaboration:   o Physical Therapist  o Occupational Therapist  o Registered Nurse  · After treatment position/precautions:   o Up in chair  o Bed alarm/tab alert on  o Bed/Chair-wheels locked  o Bed in low position  o Call light within reach  o RN notified   · Compliance with Program/Exercises: Will assess as treatment progresses  · Recommendations/Intent for next treatment session: \"Next visit will focus on advancements to more challenging activities\".   Total Treatment Duration:  PT Patient Time In/Time Out  Time In: 0948  Time Out: HORTENCIA [place]

## 2019-03-15 NOTE — PROGRESS NOTES
Problem: Self Care Deficits Care Plan (Adult)  Goal: *Acute Goals and Plan of Care (Insert Text)  1. Patient will perform grooming with supervision within 7 days with equipment as needed. 2.  Patient will perform bathing with minimal assistance within 7 days with equipment as needed. 3.  Patient will perform lower body dressing and toileting with moderate assistance within 7 days with equipment as needed. 4.   Patient will perform toilet transfer with supervision within 7 days with equipment as needed. 5.  Pt will participate in B UE therapeutic exercises for 8 minutes with 3 rest breaks within 7 days. 6.  Pt and or caregiver to demonstrate and verbalize good understanding of recommendations for increasing safety with functional tasks within 7 days. OCCUPATIONAL THERAPY: Initial Assessment and AM 3/15/2019  OBSERVATION:    Payor: Cortez Hines / [facility] [employer] MEDIC[facility] CHOICE PPO/PFFS / Product Type: Managed Care Medicare /      NAME/AGE/GENDER: Rodriguez Landa is a 78 y.o. male   PRIMARY DIAGNOSIS:  Generalized weakness [R53.1] Generalized weakness Generalized weakness       ICD-10: Treatment Diagnosis:    · Stiffness of Left Shoulder, Not elsewhere classified (M25.612)  · Stiffness of Right Shoulder, Not elsewhere classified (M25.611)  · Generalized Muscle Weakness (M62.81)  · History of falling (Z91.81)   Precautions/Allergies:    FAlls Fosinopril and Lisinopril      ASSESSMENT:     Mr. Sharlene Pederson admitted to hospital with above diagnosis. Patient reported that he has had a fall at home in past year. Patient lives at home with his wife, who assists him with ADLs. Patient stated that sometime he takes showers, where he steps into shower, and sits onto a shower chair with use of grab bars with assist from his wife. Patient stated that he was able to ambulate with RW. Patient has former CVA affecting his L UE, and L UE shoulder AROM is limited & L UE coordination impaired.   Moderate edema noted in patient's L arm, though patient is a poor historian. Patient Alert and Oriented x 4 (Pt oriented to month). Patient sat on edge of bed with minimal assist, and patient required total assistance to don sock. Patient required MIn A for bed to chair transfer. Patient functioning just below baseline and patient to benefit from Occupational Therapy to maximize ADL performance. Cont OT per tx plan. This section established at most recent assessment   PROBLEM LIST (Impairments causing functional limitations):  1. Decreased Strength  2. Decreased ADL/Functional Activities  3. Decreased Transfer Abilities  4. Decreased Ambulation Ability/Technique  5. Decreased Balance  6. Increased Pain  7. Decreased Activity Tolerance  8. Decreased Work Simplification/Energy Conservation Techniques  9. Increased Fatigue  10. Decreased Flexibility/Joint Mobility  11. Edema/Girth  12. Decreased Isabela with Home Exercise Program  13. Decreased Cognition   INTERVENTIONS PLANNED: (Benefits and precautions of occupational therapy have been discussed with the patient.)  1. Activities of daily living training  2. Adaptive equipment training  3. Balance training  4. Clothing management  5. Cognitive training  6. Donning&doffing training  7. Group therapy  8. Hygiene training  9. Medication management training  10. Neuromuscular re-eduation  11. Re-evaluation  12. Therapeutic activity  13. Therapeutic exercise     TREATMENT PLAN: Frequency/Duration: Follow patient 3x's/wk to address above goals. Rehabilitation Potential For Stated Goals: Good     RECOMMENDED REHABILITATION/EQUIPMENT: (at time of discharge pending progress): Due to the probability of continued deficits (see above) this patient will likely need continued skilled occupational therapy after discharge.   Equipment:    None at this time              OCCUPATIONAL PROFILE AND HISTORY:   History of Present Injury/Illness (Reason for Referral):  Mr. Jailyn Merrill is a 79 y/o male with a h/o BPH, CAD, HTN, DM, lumbar radiculopathy, TIA, AFib on Eliquis who presents with progressive weakness since this past Sunday. This has been a recurrent complaint. 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 He developed AFib and was started on BB, amiodarone and Eliquis. He was weak then but was discharged home only to return with c/o weakness. CT head and the rest of his w/u at that time was negative. He was discharged to rehab and was there about 3 weeks. He has been home almost two weeks but since this past Sunday has become weak again. Does have h/o CVA but no reported slurred speech or asymmetric weakness. Repeat head CT today shows chronic small vessel disease and unchanged right BG lacunar infarcts. He denies any N/V/D but has had poor PO intake. No major stressors or life changes noted but does appear weak and depressed. No fevers, chills or sick contacts that he's aware of. Unfortunately his wife is unable to provide for him like this at home. Past Medical History/Comorbidities:   Mr. Oma Soares  has a past medical history of Abdominal complaints (12/18/2013), Arthritis, Asthma, BPH (benign prostatic hyperplasia), CAD (coronary artery disease), Chronic pain, COPD, Depression (emotion) (12/18/2013), Diabetes ([address] GERD (gastroesophageal reflux disease), Hypertension, Neuropathy, Neuropathy, Other ill-defined conditions(799.89), Post traumatic stress disorder (1/12/2012), PTSD (post-traumatic stress disorder), Seizures ([address] Stroke ([address] Thrombocytopenia, unspecified ([address] (1/12/2012), and Thyroid disease. Mr. Oma Soares  has a past surgical history that includes hx heent (2010); hx other surgical (1967/68); hx orthopaedic; bone marrow aspirate &biopsy (1/19/2012); pr cardiac surg procedure unlist; hx heart catheterization (4/23/2015); hx back surgery; and pr neurological procedure unlisted.   Social History/Living Environment:   Home Environment: Private residence  # Steps to Enter: 2  Wheelchair Ramp: No  One/Two Story Residence: Two story, live on 1st floor  Lift Chair Available: No  Living Alone: No  Support Systems: Spouse/Significant Other/Partner  Patient Expects to be Discharged to[de-identified] Skilled nursing facility  Current DME Used/Available at Home: Commode, bedside, Grab bars, Shower chair, Walker, rolling  Tub or Shower Type: Shower  Prior Level of Function/Work/Activity:  Patient reported that he has had a fall at home in past year. Patient lives at home with his wife, who assists him with ADLs. Patient stated that sometime he takes showers, where he steps into shower, and sits onto a shower chair with use of grab bars with assist from his wife. Patient stated that he was able to ambulate with RW. Patient has former CVA affecting his L UE, and L UE shoulder AROM is limited & L UE coordination impaired. Number of Personal Factors/Comorbidities that affect the Plan of Care: Expanded review of therapy/medical records (1-2):  MODERATE COMPLEXITY   ASSESSMENT OF OCCUPATIONAL PERFORMANCE[de-identified]   Activities of Daily Living:   Basic ADLs (From Assessment) Complex ADLs (From Assessment)   Feeding: Minimum assistance  Oral Facial Hygiene/Grooming: Moderate assistance  Bathing: Maximum assistance  Upper Body Dressing: Moderate assistance  Lower Body Dressing: Total assistance  Toileting: Total assistance Instrumental ADL  Meal Preparation: Total assistance  Homemaking:  Total assistance  Medication Management: Maximum assistance  Financial Management: Total assistance   Grooming/Bathing/Dressing Activities of Daily Living     Cognitive Retraining  Safety/Judgement: Fall prevention                       Bed/Mat Mobility  Sit to Stand: Minimum assistance(bed elevated, pt 6')  Stand to Sit: Contact guard assistance;Minimum assistance  Bed to Chair: Minimum assistance       Most Recent Physical Functioning:   Gross Assessment:                  Posture:  Posture (WDL): Exceptions to WDL  Posture Assessment: Forward head, Rounded shoulders  Balance:  Sitting: Impaired  Sitting - Static: Good (unsupported)  Sitting - Dynamic: Fair (occasional)  Standing: Impaired  Standing - Static: Fair  Standing - Dynamic : Fair Bed Mobility:     Wheelchair Mobility:     Transfers:  Sit to Stand: Minimum assistance(bed elevated, pt 6')  Stand to Sit: Contact guard assistance;Minimum assistance  Bed to Chair: Minimum assistance            Patient Vitals for the past 6 hrs:   BP BP Patient Position SpO2 Pulse   03/15/19 0726 174/82 At rest 93 % 66   03/15/19 1136 160/82 At rest 98 % 67       Mental Status  Neurologic State: Alert  Orientation Level: Oriented X4(pt oriented to month)  Cognition: Decreased attention/concentration, Follows commands(increased time to process simple questions/commands)  Perception: Appears intact  Perseveration: No perseveration noted  Safety/Judgement: Fall prevention                          Physical Skills Involved:  1. Range of Motion  2. Balance  3. Strength  4. Activity Tolerance  5. Fine Motor Control  6. Gross Motor Control  7. Pain (Chronic)  8. Edema Cognitive Skills Affected (resulting in the inability to perform in a timely and safe manner):  1. Executive Function  2. Immediate Memory  3. Sustained Attention  4. Divided Attention  5. Increased time to process Psychosocial Skills Affected:  1. Habits/Routines  2. Social Interaction   Number of elements that affect the Plan of Care: 5+:  HIGH COMPLEXITY   CLINICAL DECISION MAKING:   M MIRAGE -PAC 6 Clicks   Daily Activity Inpatient Short Form  How much help from another person does the patient currently need. .. Total A Lot A Little None   1. Putting on and taking off regular lower body clothing? [x] 1   [] 2   [] 3   [] 4   2. Bathing (including washing, rinsing, drying)? [] 1   [x] 2   [] 3   [] 4   3. Toileting, which includes using toilet, bedpan or urinal?   [x] 1   [] 2   [] 3   [] 4   4.   Putting on and taking off regular upper body clothing? [] 1   [x] 2   [] 3   [] 4   5. Taking care of personal grooming such as brushing teeth? [] 1   [x] 2   [] 3   [] 4   6. Eating meals? [] 1   [] 2   [x] 3   [] 4   © 2007, Trustees of [address] [place] [address] [address] [address], under license to [employer]. All rights reserved      Score:  Initial: 13 Most Recent: X (Date: -- )    Interpretation of Tool:  Represents activities that are increasingly more difficult (i.e. Bed mobility, Transfers, Gait). Medical Necessity:     · Patient demonstrates good rehab potential due to higher previous functional level. Reason for Services/Other Comments:  · Patient continues to require skilled intervention due to patient's inability to take care of self. Use of outcome tool(s) and clinical judgement create a POC that gives a: MODERATE COMPLEXITY         TREATMENT:   (In addition to Assessment/Re-Assessment sessions the following treatments were rendered)     Pre-treatment Symptoms/Complaints:    Pain: Initial:   Pain Intensity 1: 7  Pain Location 1: Back, Neck  Pain Intervention(s) 1: Emotional support, Repositioned  Post Session:  7     Assessment/Reassessment only, no treatment provided today    Braces/Orthotics/Lines/Etc:   · IV  · O2 Device: Room air  Treatment/Session Assessment:    · Response to Treatment:  positive  · Interdisciplinary Collaboration:   o Physical Therapist  o Registered Nurse  · After treatment position/precautions:   o Bed in low position  o Caregiver at bedside  o Call light within reach  o RN notified  o Patient sitting with PT on edge of bed   · Compliance with Program/Exercises: Compliant all of the time, Will assess as treatment progresses. · Recommendations/Intent for next treatment session: \"Next visit will focus on advancements to more challenging activities and reduction in assistance provided\".   Total Treatment Duration:  OT Patient Time In/Time Out  Time In: [address]  Time Out: [address], OT

## 2019-03-15 NOTE — PROGRESS NOTES
Progress Note    Patient: Prabhjot Freitas MRN: [id number]  SSN: [id number]    YOB: 1950  Age: 78 y.o. Sex: male      Admit Date: 3/14/2019    LOS: 0 days     Subjective:   Patient examined at bedside. He was noted in no distress, stating feeling okay but feels frustrated about being weak all the time. Objective:     Vitals:    03/14/19 2325 03/15/19 0159 03/15/19 0606 03/15/19 0726   BP: 176/84 180/84 (!) 192/98 174/82   Pulse: 67 (!) 59  66   Resp: 18 18  18   Temp: 98.1 °F (36.7 °C) 98.1 °F (36.7 °C)  99.3 °F (37.4 °C)   SpO2: 95% 97%  93%   Weight:       Height:            Intake and Output:  Current Shift: No intake/output data recorded. Last three shifts: No intake/output data recorded. Physical Exam:   GENERAL: alert, cooperative, no distress, appears stated age  EYE: negative  LYMPHATIC: Cervical, supraclavicular, and axillary nodes normal.   THROAT & NECK: normal and no erythema or exudates noted. LUNG: clear to auscultation bilaterally  HEART: regular rate and rhythm, S1, S2 normal, no murmur, click, rub or gallop  ABDOMEN: soft, non-tender. Bowel sounds normal. No masses,  no organomegaly  EXTREMITIES:  extremities normal, atraumatic, no cyanosis or edema  SKIN: Normal.  NEUROLOGIC: oriented x 3, he has weakness over his upper and lower extremities, he is able to lift UE against gravity, but he is unable to do it with his Lower extremities. He is able to move them side ways. Sensitivity is preserved. PSYCHIATRIC: non focal    Lab/Data Review: All lab results for the last 24 hours reviewed. Assessment:     Principal Problem:    Generalized weakness (2/3/2019)    Active Problems:    Hypertension (1/12/2012)      Hyperlipidemia (5/2/2014)      History of CVA (cerebrovascular accident) (9/14/2016)      S/P placement of cardiac pacemaker (2/3/2019)      Atrial fibrillation [address] (3/14/2019)        Plan:   -Generalized weakness:   It has been present for a couple of months, the patient was recently here from 2/3-2/5/19 for the same problem, he was referred to STR, and was released to home  He complains of chronic neck and back pain, also mild numbness of his legs  He underwent neck and lumbar back surgery in the 1970s. Check CT cervical and lumbar spine, if unrevealing may order an MRI  Pain control  PT/OT    -CAD, sp sent in 2008: asymptomatic:  Continue home meds    -Bradycardia/P a fibrillation, sp PM placement on 2/1/19: On eliquis, toprol      -HTN: home meds     -BPH: home meds     -AFib: Amio, BB, Eliquis     -Hypothyroidism: synthroid      -DM: insulin      DVT ppx: compression stockings.  On eliquis     Code status: DNR/DNI    Disposition: home versus STR depending on patients progression and PT notes      Signed By: Renae Galvan MD     March 15, 2019

## 2019-03-15 NOTE — PROGRESS NOTES
Initial visit by  to convey care and concern and encourage patient that  services are available if desired. Mr. Janine Hearn was receiving care and I spoke with family at bedside and provided 's business card for future reference. Chaplains remain available for support.      Jono Adams Ace [address]  Board Certified

## 2019-03-15 NOTE — PROGRESS NOTES
SW and CM met with pt and pt's spouse at bedside. Pt came from home. He was previously at [facility] rehab for the last 2 weeks. Per spouse, when he came home, he was able to walk. But since Sunday, he has not been able to. Tests are being run to determine cause. Per spouse, however, she is refusing to take him home. She says his children live out of state and she is alone. She can not manage him at home. SW stressed that pt was on observation and needs 3 midnights to qualify for rehab. SW tried to discuss discharge futher, being that pt has equipment at home and home health. JESSICA spoke with Dr. [employer], who stated pt is in obs status. JESSICA will continue to monitor and follow up. Care Management Interventions  PCP Verified by CM:  Yes  Mode of Transport at Discharge: BLS  Transition of Care Consult (CM Consult): Discharge Planning, SNF  Discharge Durable Medical Equipment: No  Physical Therapy Consult: Yes  Occupational Therapy Consult: Yes  Speech Therapy Consult: No  Current Support Network: Lives with Spouse, Own Home  Confirm Follow Up Transport: Family  Plan discussed with Pt/Family/Caregiver: Yes  Freedom of Choice Offered: Yes  Discharge Location  Discharge Placement: Unable to determine at this time

## 2019-03-15 NOTE — PROGRESS NOTES
END OF SHIFT NOTE:    Intake/Output  No intake/output data recorded. Voiding: YES  Catheter: NO  Drain:              Stool:  0 occurrences. Emesis:  0 occurrences. VITAL SIGNS  Patient Vitals for the past 12 hrs:   Temp Pulse Resp BP SpO2   03/15/19 1524 99 °F (37.2 °C) 66 16 152/76 97 %   03/15/19 1136 98.2 °F (36.8 °C) 67 15 160/82 98 %   03/15/19 0726 99.3 °F (37.4 °C) 66 18 174/82 93 %       Pain Assessment  Pain 1  Pain Scale 1: Visual (03/15/19 1620)  Pain Intensity 1: 0 (03/15/19 1620)  Patient Stated Pain Goal: 0 (03/15/19 1620)  Pain Reassessment 1: Patient sleeping (03/15/19 1620)  Pain Onset 1: chronic (03/15/19 0947)  Pain Location 1: Back;Neck (03/15/19 1519)  Pain Orientation 1: Lower (03/15/19 1519)  Pain Description 1: Aching (03/15/19 1519)  Pain Intervention(s) 1: Medication (see MAR) (03/15/19 1519)    Ambulating  Yes    Additional Information: Pt worked with PT, up in the chair. Tylenol given twice for back and neck pain. Wife asking for patient to go to SNF. MD made aware. No complaints noted at this time. Shift report given to Zhao Griffiths RN oncoming nurse at the bedside.     Diana Nguyen RN

## 2019-03-15 NOTE — INTERDISCIPLINARY ROUNDS
Interdisciplinary rounds with charge nurse, provider, and .     Presenting Problem: Weakness  Daily Goal MRI brain  Expected Discharge Date: 3/17/19  Expected Discharge Needs: TBD, CM following  Patient/Family Concerns addressed: wife concerned about taking pt home, discussed options of home health

## 2019-03-15 NOTE — PROGRESS NOTES
Per radiologist, ordering needs to speak with radiologist before proceeding with imaging on the cervical and lumbar spine. Paged dr. Celestino Baumgarten at this time.

## 2019-03-15 NOTE — PROGRESS NOTES
Problem: Falls - Risk of  Goal: *Absence of Falls  Document Cleo Fall Risk and appropriate interventions in the flowsheet. Outcome: Progressing Towards Goal  Fall Risk Interventions:       Mentation Interventions: Bed/chair exit alarm, Door open when patient unattended, Reorient patient    Medication Interventions: Bed/chair exit alarm, Patient to call before getting OOB, Teach patient to arise slowly    Elimination Interventions: Call light in reach, Bed/chair exit alarm, Patient to call for help with toileting needs, Toileting schedule/hourly rounds, Urinal in reach    History of Falls Interventions: Bed/chair exit alarm, Door open when patient unattended, Investigate reason for fall        Problem: Pressure Injury - Risk of  Goal: *Prevention of pressure injury  Document Hugh Scale and appropriate interventions in the flowsheet.   Outcome: Progressing Towards Goal  Pressure Injury Interventions:  Sensory Interventions: Assess changes in LOC, Discuss PT/OT consult with provider, Check visual cues for pain    Moisture Interventions: Absorbent underpads, Apply protective barrier, creams and emollients, Check for incontinence Q2 hours and as needed    Activity Interventions: Assess need for specialty bed, Increase time out of bed, Pressure redistribution bed/mattress(bed type), PT/OT evaluation    Mobility Interventions: Assess need for specialty bed, Pressure redistribution bed/mattress (bed type), PT/OT evaluation    Nutrition Interventions: Document food/fluid/supplement intake, Offer support with meals,snacks and hydration    Friction and Shear Interventions: Apply protective barrier, creams and emollients, Foam dressings/transparent film/skin sealants

## 2019-03-15 NOTE — PROGRESS NOTES
Problem: Falls - Risk of  Goal: *Absence of Falls  Document Cleo Fall Risk and appropriate interventions in the flowsheet. Outcome: Progressing Towards Goal  Fall Risk Interventions:       Mentation Interventions: Bed/chair exit alarm, Door open when patient unattended, Reorient patient    Medication Interventions: Bed/chair exit alarm, Patient to call before getting OOB, Teach patient to arise slowly    Elimination Interventions: Bed/chair exit alarm, Call light in reach, Patient to call for help with toileting needs, Toileting schedule/hourly rounds, Urinal in reach    History of Falls Interventions: Bed/chair exit alarm, Door open when patient unattended, Investigate reason for fall        Problem: Pressure Injury - Risk of  Goal: *Prevention of pressure injury  Document Hugh Scale and appropriate interventions in the flowsheet.   Outcome: Progressing Towards Goal  Pressure Injury Interventions:  Sensory Interventions: Assess changes in LOC, Discuss PT/OT consult with provider, Check visual cues for pain    Moisture Interventions: Absorbent underpads, Apply protective barrier, creams and emollients, Check for incontinence Q2 hours and as needed    Activity Interventions: Assess need for specialty bed, Increase time out of bed, Pressure redistribution bed/mattress(bed type), PT/OT evaluation    Mobility Interventions: Assess need for specialty bed, Pressure redistribution bed/mattress (bed type), PT/OT evaluation    Nutrition Interventions: Document food/fluid/supplement intake, Offer support with meals,snacks and hydration    Friction and Shear Interventions: Apply protective barrier, creams and emollients, Foam dressings/transparent film/skin sealants no

## 2019-03-16 ENCOUNTER — APPOINTMENT (OUTPATIENT)
Dept: MRI IMAGING | Age: 69
End: 2019-03-16
Attending: INTERNAL MEDICINE
Payer: MEDICARE

## 2019-03-16 LAB
GLUCOSE BLD STRIP.AUTO-MCNC: 176 MG/DL (ref 65–100)
GLUCOSE BLD STRIP.AUTO-MCNC: 195 MG/DL (ref 65–100)
GLUCOSE BLD STRIP.AUTO-MCNC: 201 MG/DL (ref 65–100)
GLUCOSE BLD STRIP.AUTO-MCNC: 242 MG/DL (ref 65–100)
MM INDURATION POC: 0 MM (ref 0–5)
PPD POC: NORMAL NEGATIVE

## 2019-03-16 PROCEDURE — 74011250637 HC RX REV CODE- 250/637: Performed by: INTERNAL MEDICINE

## 2019-03-16 PROCEDURE — 74011250637 HC RX REV CODE- 250/637: Performed by: HOSPITALIST

## 2019-03-16 PROCEDURE — 99218 HC RM OBSERVATION: CPT

## 2019-03-16 PROCEDURE — 74011636637 HC RX REV CODE- 636/637: Performed by: INTERNAL MEDICINE

## 2019-03-16 PROCEDURE — 82962 GLUCOSE BLOOD TEST: CPT

## 2019-03-16 PROCEDURE — 3331090001 HH PPS REVENUE CREDIT

## 2019-03-16 PROCEDURE — 3331090002 HH PPS REVENUE DEBIT

## 2019-03-16 RX ADMIN — GALANTAMINE 8 MG: 4 TABLET, FILM COATED ORAL at 08:38

## 2019-03-16 RX ADMIN — METOPROLOL SUCCINATE 50 MG: 50 TABLET, EXTENDED RELEASE ORAL at 08:37

## 2019-03-16 RX ADMIN — INSULIN LISPRO 4 UNITS: 100 INJECTION, SOLUTION INTRAVENOUS; SUBCUTANEOUS at 12:21

## 2019-03-16 RX ADMIN — CLOPIDOGREL BISULFATE 75 MG: 75 TABLET, FILM COATED ORAL at 08:37

## 2019-03-16 RX ADMIN — APIXABAN 5 MG: 5 TABLET, FILM COATED ORAL at 08:37

## 2019-03-16 RX ADMIN — ISOSORBIDE MONONITRATE 30 MG: 30 TABLET, EXTENDED RELEASE ORAL at 08:37

## 2019-03-16 RX ADMIN — INSULIN LISPRO 4 UNITS: 100 INJECTION, SOLUTION INTRAVENOUS; SUBCUTANEOUS at 17:02

## 2019-03-16 RX ADMIN — GABAPENTIN 100 MG: 100 CAPSULE ORAL at 08:38

## 2019-03-16 RX ADMIN — Medication 10 ML: at 21:20

## 2019-03-16 RX ADMIN — HYDRALAZINE HYDROCHLORIDE 50 MG: 50 TABLET, FILM COATED ORAL at 08:38

## 2019-03-16 RX ADMIN — HYDRALAZINE HYDROCHLORIDE 50 MG: 50 TABLET, FILM COATED ORAL at 21:19

## 2019-03-16 RX ADMIN — LOSARTAN POTASSIUM 100 MG: 50 TABLET ORAL at 08:37

## 2019-03-16 RX ADMIN — TAMSULOSIN HYDROCHLORIDE 0.4 MG: 0.4 CAPSULE ORAL at 08:37

## 2019-03-16 RX ADMIN — GABAPENTIN 100 MG: 100 CAPSULE ORAL at 16:18

## 2019-03-16 RX ADMIN — APIXABAN 5 MG: 5 TABLET, FILM COATED ORAL at 21:19

## 2019-03-16 RX ADMIN — INSULIN LISPRO 2 UNITS: 100 INJECTION, SOLUTION INTRAVENOUS; SUBCUTANEOUS at 08:38

## 2019-03-16 RX ADMIN — FUROSEMIDE 20 MG: 20 TABLET ORAL at 08:37

## 2019-03-16 RX ADMIN — PANTOPRAZOLE SODIUM 40 MG: 40 TABLET, DELAYED RELEASE ORAL at 08:37

## 2019-03-16 RX ADMIN — HYDRALAZINE HYDROCHLORIDE 50 MG: 50 TABLET, FILM COATED ORAL at 16:18

## 2019-03-16 RX ADMIN — INSULIN LISPRO 2 UNITS: 100 INJECTION, SOLUTION INTRAVENOUS; SUBCUTANEOUS at 21:30

## 2019-03-16 RX ADMIN — AMIODARONE HYDROCHLORIDE 100 MG: 200 TABLET ORAL at 08:41

## 2019-03-16 RX ADMIN — Medication 10 ML: at 05:56

## 2019-03-16 RX ADMIN — CLONIDINE HYDROCHLORIDE 0.2 MG: 0.1 TABLET ORAL at 00:57

## 2019-03-16 RX ADMIN — Medication 10 ML: at 14:25

## 2019-03-16 RX ADMIN — CYANOCOBALAMIN TAB 1000 MCG 1000 MCG: 1000 TAB at 08:37

## 2019-03-16 RX ADMIN — GABAPENTIN 100 MG: 100 CAPSULE ORAL at 21:19

## 2019-03-16 RX ADMIN — AMLODIPINE BESYLATE 10 MG: 10 TABLET ORAL at 08:37

## 2019-03-16 RX ADMIN — GALANTAMINE 8 MG: 4 TABLET, FILM COATED ORAL at 17:10

## 2019-03-16 RX ADMIN — ESCITALOPRAM OXALATE 15 MG: 10 TABLET ORAL at 08:37

## 2019-03-16 RX ADMIN — LEVOTHYROXINE SODIUM 50 MCG: 50 TABLET ORAL at 05:56

## 2019-03-16 RX ADMIN — RISPERIDONE 1 MG: 1 TABLET ORAL at 08:37

## 2019-03-16 NOTE — PROGRESS NOTES
Progress Note    Patient: Cass Jalloh MRN: [id number]  SSN: [id number]    YOB: 1950  Age: 78 y.o. Sex: male      Admit Date: 3/14/2019    LOS: 0 days     Subjective:   Patient examined at bedside. His wife was present in the room and all questions were answered. He was in no distress. He persists weak. Objective:     Vitals:    03/15/19 2314 03/16/19 0033 03/16/19 0350 03/16/19 0600   BP: (!) 210/90 190/78 178/72 141/87   Pulse: 65  60    Resp: 18  18    Temp: 98.1 °F (36.7 °C)  97.7 °F (36.5 °C)    SpO2: 97%  96%    Weight:       Height:            Intake and Output:  Current Shift: No intake/output data recorded. Last three shifts: 03/14 1901 - 03/16 0700  In: 639 [P.O.:608; I.V.:31]  Out: -     Physical Exam:   GENERAL: alert, cooperative, no distress, appears stated age  EYE: negative  LYMPHATIC: Cervical, supraclavicular, and axillary nodes normal.   THROAT & NECK: normal and no erythema or exudates noted. LUNG: clear to auscultation bilaterally  HEART: regular rate and rhythm, S1, S2 normal, no murmur, click, rub or gallop  ABDOMEN: soft, non-tender. Bowel sounds normal. No masses,  no organomegaly  EXTREMITIES:  extremities normal, atraumatic, no cyanosis or edema  SKIN: Normal.  NEUROLOGIC: oriented x 3, he has weakness over his upper and lower extremities, he is able to lift UE against gravity, but he is unable to do it with his Lower extremities. He is able to move them side ways. Sensitivity is preserved. PSYCHIATRIC: non focal    Lab/Data Review: All lab results for the last 24 hours reviewed. Assessment:     Principal Problem:    Generalized weakness (2/3/2019)    Active Problems:    Hypertension (1/12/2012)      Hyperlipidemia (5/2/2014)      History of CVA (cerebrovascular accident) (9/14/2016)      S/P placement of cardiac pacemaker (2/3/2019)      Atrial fibrillation ([address] (3/14/2019)        Plan:   -Generalized weakness:   It has been present for a couple of months, the patient was recently here from 2/3-2/5/19 for the same problem  He complains of chronic neck and back pain, also mild numbness of his legs  He underwent neck and lumbar back surgery in the 1970s. Check MRI cervical and lumbar spine to assess for stenosis, he has a prior CT cervical and lumbar spine on 2015, which showed severe cervical stenosis at C3-5 and lumbar stenosis at L2-4  Pain control  PT/OT    -CAD, sp sent in 2008: asymptomatic:  Continue home meds    -Bradycardia/P a fibrillation, sp PM placement on 2/1/19: On eliquis, toprol      -HTN: home meds     -BPH: home meds     -AFib: Amio, BB, Eliquis     -Hypothyroidism: synthroid      -DM: insulin      DVT ppx: compression stockings.  On eliquis     Code status: DNR/DNI    Disposition: STR      Signed By: Hailey Lea MD     March 16, 2019

## 2019-03-16 NOTE — PROGRESS NOTES
END OF SHIFT NOTE:    Intake/Output  03/15 1901 - 03/16 0700  In: 111 [P.O.:90; I.V.:21]  Out: -    Voiding: YES  Catheter: NO  Drain:              Stool:  0 occurrences. Emesis:  0 occurrences. VITAL SIGNS  Patient Vitals for the past 12 hrs:   Temp Pulse Resp BP SpO2   03/16/19 0600    141/87    03/16/19 0350 97.7 °F (36.5 °C) 60 18 178/72 96 %   03/16/19 0033    190/78    03/15/19 2314 98.1 °F (36.7 °C) 65 18 (!) 210/90 97 %   03/15/19 1915 99 °F (37.2 °C) 60 18 158/70 96 %       Pain Assessment  Pain 1  Pain Scale 1: Visual (03/16/19 0256)  Pain Intensity 1: 0 (03/16/19 0256)  Patient Stated Pain Goal: 0 (03/15/19 1620)  Pain Reassessment 1: Patient sleeping (03/16/19 0256)  Pain Onset 1: chronic (03/15/19 0947)  Pain Location 1: Back;Neck (03/15/19 1519)  Pain Orientation 1: Lower (03/15/19 1519)  Pain Description 1: Aching (03/15/19 1519)  Pain Intervention(s) 1: Medication (see MAR) (03/15/19 1519)    Ambulating  No    Additional Information: patient had increase BP again last night. Controlled with hydralazine 10 mg IV and catapres 0.2mg PO. Shift report given to oncoming nurse at the bedside.     Danii Castillo RN

## 2019-03-16 NOTE — PROGRESS NOTES
Informed RN at [facility] that patient could not have MRI until Monday due to pacemaker and needing a representative; per policy and procedure

## 2019-03-16 NOTE — PROGRESS NOTES
END OF SHIFT NOTE:    Intake/Output  No intake/output data recorded. Voiding: YES  Catheter: NO  Drain:              Stool:  2 occurrences. Stool Assessment  Stool Color: [place] (03/16/19 0722)  Stool Appearance: Formed (03/16/19 1404)  Stool Amount: Large (03/16/19 0722)  Stool Source/Status: Rectum (03/16/19 2098)    Emesis:  0 occurrences. VITAL SIGNS  Patient Vitals for the past 12 hrs:   Temp Pulse Resp BP SpO2   03/16/19 1618 98 °F (36.7 °C) 63 16 139/75 98 %   03/16/19 1231 98.2 °F (36.8 °C) 70 18 125/67 100 %       Pain Assessment  Pain 1  Pain Scale 1: Numeric (0 - 10) (03/16/19 1920)  Pain Intensity 1: 0 (03/16/19 1920)  Patient Stated Pain Goal: 0 (03/16/19 1920)  Pain Reassessment 1: Patient sleeping (03/16/19 0256)  Pain Onset 1: chronic (03/15/19 0947)  Pain Location 1: Back;Neck (03/15/19 1519)  Pain Orientation 1: Lower (03/15/19 1519)  Pain Description 1: Aching (03/15/19 1519)  Pain Intervention(s) 1: Medication (see MAR) (03/15/19 1519)    Ambulating  Yes    Additional Information: Pt rested well today. Wife at the bedside no back pain today. NO complaints noted at this time. Shift report given to Ike Pack RN oncoming nurse at the bedside.     Suzi Terry RN

## 2019-03-16 NOTE — PROGRESS NOTES
Problem: Falls - Risk of  Goal: *Absence of Falls  Document Cleo Fall Risk and appropriate interventions in the flowsheet. Outcome: Progressing Towards Goal  Fall Risk Interventions:  Mobility Interventions: Bed/chair exit alarm    Mentation Interventions: Adequate sleep, hydration, pain control    Medication Interventions: Bed/chair exit alarm    Elimination Interventions: Bed/chair exit alarm    History of Falls Interventions: Bed/chair exit alarm        Problem: Pressure Injury - Risk of  Goal: *Prevention of pressure injury  Document Hugh Scale and appropriate interventions in the flowsheet.   Outcome: Progressing Towards Goal  Pressure Injury Interventions:  Sensory Interventions: Assess changes in LOC    Moisture Interventions: Absorbent underpads    Activity Interventions: Assess need for specialty bed    Mobility Interventions: Assess need for specialty bed    Nutrition Interventions: Document food/fluid/supplement intake    Friction and Shear Interventions: Apply protective barrier, creams and emollients, HOB 30 degrees or less

## 2019-03-17 LAB
GLUCOSE BLD STRIP.AUTO-MCNC: 140 MG/DL (ref 65–100)
GLUCOSE BLD STRIP.AUTO-MCNC: 157 MG/DL (ref 65–100)
GLUCOSE BLD STRIP.AUTO-MCNC: 203 MG/DL (ref 65–100)
GLUCOSE BLD STRIP.AUTO-MCNC: 207 MG/DL (ref 65–100)
MM INDURATION POC: 0 MM (ref 0–5)
PPD POC: NEGATIVE NEGATIVE

## 2019-03-17 PROCEDURE — 74011250637 HC RX REV CODE- 250/637: Performed by: INTERNAL MEDICINE

## 2019-03-17 PROCEDURE — 82962 GLUCOSE BLOOD TEST: CPT

## 2019-03-17 PROCEDURE — 3331090002 HH PPS REVENUE DEBIT

## 2019-03-17 PROCEDURE — 99218 HC RM OBSERVATION: CPT

## 2019-03-17 PROCEDURE — 3331090001 HH PPS REVENUE CREDIT

## 2019-03-17 PROCEDURE — 74011636637 HC RX REV CODE- 636/637: Performed by: INTERNAL MEDICINE

## 2019-03-17 RX ADMIN — RISPERIDONE 1 MG: 1 TABLET ORAL at 08:18

## 2019-03-17 RX ADMIN — AMLODIPINE BESYLATE 10 MG: 10 TABLET ORAL at 08:19

## 2019-03-17 RX ADMIN — CYANOCOBALAMIN TAB 1000 MCG 1000 MCG: 1000 TAB at 08:19

## 2019-03-17 RX ADMIN — TAMSULOSIN HYDROCHLORIDE 0.4 MG: 0.4 CAPSULE ORAL at 08:18

## 2019-03-17 RX ADMIN — INSULIN LISPRO 4 UNITS: 100 INJECTION, SOLUTION INTRAVENOUS; SUBCUTANEOUS at 13:14

## 2019-03-17 RX ADMIN — FUROSEMIDE 20 MG: 20 TABLET ORAL at 08:20

## 2019-03-17 RX ADMIN — GABAPENTIN 100 MG: 100 CAPSULE ORAL at 21:27

## 2019-03-17 RX ADMIN — HYDRALAZINE HYDROCHLORIDE 50 MG: 50 TABLET, FILM COATED ORAL at 21:27

## 2019-03-17 RX ADMIN — GALANTAMINE 8 MG: 4 TABLET, FILM COATED ORAL at 08:19

## 2019-03-17 RX ADMIN — METOPROLOL SUCCINATE 50 MG: 50 TABLET, EXTENDED RELEASE ORAL at 08:19

## 2019-03-17 RX ADMIN — Medication 10 ML: at 05:39

## 2019-03-17 RX ADMIN — CLOPIDOGREL BISULFATE 75 MG: 75 TABLET, FILM COATED ORAL at 08:19

## 2019-03-17 RX ADMIN — Medication 10 ML: at 13:14

## 2019-03-17 RX ADMIN — HYDRALAZINE HYDROCHLORIDE 50 MG: 50 TABLET, FILM COATED ORAL at 08:19

## 2019-03-17 RX ADMIN — ESCITALOPRAM OXALATE 15 MG: 10 TABLET ORAL at 08:16

## 2019-03-17 RX ADMIN — GABAPENTIN 100 MG: 100 CAPSULE ORAL at 16:34

## 2019-03-17 RX ADMIN — LEVOTHYROXINE SODIUM 50 MCG: 50 TABLET ORAL at 05:39

## 2019-03-17 RX ADMIN — INSULIN LISPRO 4 UNITS: 100 INJECTION, SOLUTION INTRAVENOUS; SUBCUTANEOUS at 09:15

## 2019-03-17 RX ADMIN — AMIODARONE HYDROCHLORIDE 100 MG: 200 TABLET ORAL at 08:20

## 2019-03-17 RX ADMIN — LOSARTAN POTASSIUM 100 MG: 50 TABLET ORAL at 08:19

## 2019-03-17 RX ADMIN — ISOSORBIDE MONONITRATE 30 MG: 30 TABLET, EXTENDED RELEASE ORAL at 08:18

## 2019-03-17 RX ADMIN — GALANTAMINE 8 MG: 4 TABLET, FILM COATED ORAL at 16:33

## 2019-03-17 RX ADMIN — HYDRALAZINE HYDROCHLORIDE 50 MG: 50 TABLET, FILM COATED ORAL at 16:34

## 2019-03-17 RX ADMIN — Medication 10 ML: at 21:37

## 2019-03-17 RX ADMIN — PANTOPRAZOLE SODIUM 40 MG: 40 TABLET, DELAYED RELEASE ORAL at 08:19

## 2019-03-17 RX ADMIN — APIXABAN 5 MG: 5 TABLET, FILM COATED ORAL at 21:27

## 2019-03-17 RX ADMIN — INSULIN LISPRO 2 UNITS: 100 INJECTION, SOLUTION INTRAVENOUS; SUBCUTANEOUS at 16:34

## 2019-03-17 RX ADMIN — APIXABAN 5 MG: 5 TABLET, FILM COATED ORAL at 08:18

## 2019-03-17 RX ADMIN — ACETAMINOPHEN 650 MG: 325 TABLET, FILM COATED ORAL at 08:28

## 2019-03-17 RX ADMIN — GABAPENTIN 100 MG: 100 CAPSULE ORAL at 08:18

## 2019-03-17 NOTE — PROGRESS NOTES
Visit with patient to build rapport with . Patient is calm. Receptive to  presence. Encouraged and assured patient of our continued care.     Xavi Campos,  Staff   C: [phone]  /  [email or web address]

## 2019-03-17 NOTE — PROGRESS NOTES
END OF SHIFT NOTE:    Intake/Output  03/16 1901 - 03/17 0700  In: 110 [P.O.:90; I.V.:20]  Out: -    Voiding: YES  Catheter: NO  Drain:              Stool:  2 occurrences. Stool Assessment  Stool Color: Brown (03/17/19 0116)  Stool Appearance: Loose;Mucous (03/17/19 0116)  Stool Amount: Medium (03/17/19 0116)  Stool Source/Status: Rectum (03/17/19 0116)    Emesis:  0 occurrences. VITAL SIGNS  Patient Vitals for the past 12 hrs:   Temp Pulse Resp BP SpO2   03/16/19 2333 99.7 °F (37.6 °C) 78 16 141/80 96 %   03/16/19 2034 99.5 °F (37.5 °C) 69 16 124/74 97 %       Pain Assessment  Pain 1  Pain Scale 1: Visual (03/17/19 0238)  Pain Intensity 1: 0 (03/17/19 0238)  Patient Stated Pain Goal: 0 (03/16/19 1920)  Pain Reassessment 1: Patient sleeping (03/17/19 0238)  Pain Onset 1: chronic (03/15/19 0947)  Pain Location 1: Back;Neck (03/15/19 1519)  Pain Orientation 1: Lower (03/15/19 1519)  Pain Description 1: Aching (03/15/19 1519)  Pain Intervention(s) 1: Medication (see MAR) (03/15/19 1519)    Ambulating  No    Additional Information: patient had two BM's last night. Stool is a lot looser than yesterday morning, but does not meet C Diff criteria at this time. Shift report given to oncoming nurse at the bedside.     Vidal Curling, RN

## 2019-03-17 NOTE — PROGRESS NOTES
Progress Note    Patient: Laurie Cardenas MRN: [id number]  SSN: [id number]    YOB: 1950  Age: 78 y.o. Sex: male      Admit Date: 3/14/2019    LOS: 0 days      Mr. Ko Hernandez is a 79 y/o male with a h/o BPH, CAD, HTN, DM, cervical and lumbar radiculopathy s/p cervical and lumbar surgery in 1970s, TIA, AFib on Eliquis who presents with progressive weakness since 2- weeks ago. This has been a recurrent complaint. He was hospitalized from 2/3-2/5/19 and a pacemaker was placed for bradycardia. He developed AFib and was started on BB, amiodarone and Eliquis. He was weak then but was discharged home only to return with c/o weakness. CVA work up was negative. He was discharged to rehab and was there about 3 weeks. He has been home almost two weeks and has become weak again. His wife is frustrated and stated she cannot take care of him at home. On his current hospitalization, brain ct was normal. He has weakness over his upper and lower extremities. PT and OT suggested at this moment. In view of his progression of symptoms I am ordering an MRI of his cervical and lumbar spine to rule out stenosis. He had a CT scan of the spine in 2015 which showed back then severe cervical stenosis at C3-C5 and lumbar stenosis at L2-4. Subjective:   Patient examined at bedside. He was noted in no distress. Objective:     Vitals:    03/16/19 1231 03/16/19 1618 03/16/19 2034 03/16/19 2333   BP: 125/67 139/75 124/74 141/80   Pulse: 70 63 69 78   Resp: 18 16 16 16   Temp: 98.2 °F (36.8 °C) 98 °F (36.7 °C) 99.5 °F (37.5 °C) 99.7 °F (37.6 °C)   SpO2: 100% 98% 97% 96%   Weight:       Height:            Intake and Output:  Current Shift: No intake/output data recorded. Last three shifts: 03/15 1901 - 03/17 0700  In: 271 [P.O.:220;  I.V.:51]  Out: -     Physical Exam:   GENERAL: alert, cooperative, no distress, appears stated age  EYE: negative  LYMPHATIC: Cervical, supraclavicular, and axillary nodes normal.   THROAT & NECK: normal and no erythema or exudates noted. LUNG: clear to auscultation bilaterally  HEART: regular rate and rhythm, S1, S2 normal, no murmur, click, rub or gallop  ABDOMEN: soft, non-tender. Bowel sounds normal. No masses,  no organomegaly  EXTREMITIES:  extremities normal, atraumatic, no cyanosis or edema  SKIN: Normal.  NEUROLOGIC: oriented x 3, he has weakness over his upper and lower extremities, he is able to lift UE against gravity, but he is unable to do it with his Lower extremities. He is able to move them side ways. Sensitivity is preserved. PSYCHIATRIC: non focal    Lab/Data Review: All lab results for the last 24 hours reviewed. Assessment:     Principal Problem:    Generalized weakness (2/3/2019)    Active Problems:    Hypertension (1/12/2012)      Hyperlipidemia (5/2/2014)      History of CVA (cerebrovascular accident) (9/14/2016)      S/P placement of cardiac pacemaker (2/3/2019)      Atrial fibrillation ([address] (3/14/2019)        Plan:   -Generalized weakness: It has been present for a couple of months  He complains of chronic neck and back pain, also mild numbness of his legs  He underwent neck and lumbar back surgery in the 1970s. Check MRI cervical and lumbar spine to assess for stenosis, he has a prior CT cervical and lumbar spine on 2015, which showed severe cervical stenosis at C3-5 and lumbar stenosis at L2-4  Pain control  PT/OT    -CAD, sp sent in 2008: asymptomatic:  Continue home meds    -Bradycardia/P a fibrillation, sp PM placement on 2/1/19: On eliquis, toprol      -HTN: home meds     -BPH: home meds     -AFib: Amio, BB, Eliquis     -Hypothyroidism: synthroid      -DM: insulin      DVT ppx: compression stockings. On eliquis     Code status: DNR/DNI    Disposition: STR upon discharge.  CM on board      Signed By: Chrissie Aiken MD     March 17, 2019

## 2019-03-17 NOTE — PROGRESS NOTES
END OF SHIFT NOTE:    Intake/Output  No intake/output data recorded. Voiding: YES  Catheter: NO  Drain:              Stool:  2 occurrences. Stool Assessment  Stool Color: Brown (03/17/19 1755)  Stool Appearance: Loose;Mucous (03/17/19 1755)  Stool Amount: Medium (03/17/19 1755)  Stool Source/Status: Rectum (03/17/19 1755)    Emesis:  0 occurrences. VITAL SIGNS  Patient Vitals for the past 12 hrs:   Temp Pulse Resp BP SpO2   03/17/19 1609 98.4 °F (36.9 °C) 68 18 137/69 95 %   03/17/19 1058 98.7 °F (37.1 °C) 75 18 113/63 98 %   03/17/19 1026     98 %   03/17/19 0914 98.5 °F (36.9 °C) 73 18 145/75 96 %       Pain Assessment  Pain 1  Pain Scale 1: Visual (03/17/19 0914)  Pain Intensity 1: 0 (03/17/19 0914)  Patient Stated Pain Goal: 0 (03/17/19 0914)  Pain Reassessment 1: Yes (03/17/19 0914)  Pain Onset 1: chronic (03/15/19 0947)  Pain Location 1: Back (03/17/19 0830)  Pain Orientation 1: Lower (03/15/19 1519)  Pain Description 1: Aching (03/15/19 1519)  Pain Intervention(s) 1: Medication (see MAR) (03/17/19 0830)    Ambulating  No    Additional Information:     Shift report given to oncoming nurse at the bedside.     Annie Bender

## 2019-03-18 ENCOUNTER — APPOINTMENT (OUTPATIENT)
Dept: CT IMAGING | Age: 69
End: 2019-03-18
Attending: INTERNAL MEDICINE
Payer: MEDICARE

## 2019-03-18 LAB
GLUCOSE BLD STRIP.AUTO-MCNC: 140 MG/DL (ref 65–100)
GLUCOSE BLD STRIP.AUTO-MCNC: 146 MG/DL (ref 65–100)
GLUCOSE BLD STRIP.AUTO-MCNC: 156 MG/DL (ref 65–100)
GLUCOSE BLD STRIP.AUTO-MCNC: 192 MG/DL (ref 65–100)

## 2019-03-18 PROCEDURE — 74011636637 HC RX REV CODE- 636/637: Performed by: INTERNAL MEDICINE

## 2019-03-18 PROCEDURE — 72125 CT NECK SPINE W/O DYE: CPT

## 2019-03-18 PROCEDURE — 97110 THERAPEUTIC EXERCISES: CPT

## 2019-03-18 PROCEDURE — 3331090002 HH PPS REVENUE DEBIT

## 2019-03-18 PROCEDURE — 97535 SELF CARE MNGMENT TRAINING: CPT

## 2019-03-18 PROCEDURE — 99218 HC RM OBSERVATION: CPT

## 2019-03-18 PROCEDURE — 3331090001 HH PPS REVENUE CREDIT

## 2019-03-18 PROCEDURE — 77030019605

## 2019-03-18 PROCEDURE — 74011250637 HC RX REV CODE- 250/637: Performed by: INTERNAL MEDICINE

## 2019-03-18 PROCEDURE — 82962 GLUCOSE BLOOD TEST: CPT

## 2019-03-18 PROCEDURE — 97530 THERAPEUTIC ACTIVITIES: CPT

## 2019-03-18 RX ORDER — TRAMADOL HYDROCHLORIDE 50 MG/1
50 TABLET ORAL
Status: DISCONTINUED | OUTPATIENT
Start: 2019-03-18 | End: 2019-03-20 | Stop reason: HOSPADM

## 2019-03-18 RX ADMIN — FUROSEMIDE 20 MG: 20 TABLET ORAL at 08:18

## 2019-03-18 RX ADMIN — AMLODIPINE BESYLATE 10 MG: 10 TABLET ORAL at 08:19

## 2019-03-18 RX ADMIN — PANTOPRAZOLE SODIUM 40 MG: 40 TABLET, DELAYED RELEASE ORAL at 08:19

## 2019-03-18 RX ADMIN — Medication 10 ML: at 05:42

## 2019-03-18 RX ADMIN — INSULIN LISPRO 2 UNITS: 100 INJECTION, SOLUTION INTRAVENOUS; SUBCUTANEOUS at 12:09

## 2019-03-18 RX ADMIN — RISPERIDONE 1 MG: 1 TABLET ORAL at 08:18

## 2019-03-18 RX ADMIN — CLOPIDOGREL BISULFATE 75 MG: 75 TABLET, FILM COATED ORAL at 08:19

## 2019-03-18 RX ADMIN — GABAPENTIN 100 MG: 100 CAPSULE ORAL at 17:05

## 2019-03-18 RX ADMIN — HYDRALAZINE HYDROCHLORIDE 50 MG: 50 TABLET, FILM COATED ORAL at 08:19

## 2019-03-18 RX ADMIN — HYDRALAZINE HYDROCHLORIDE 50 MG: 50 TABLET, FILM COATED ORAL at 17:05

## 2019-03-18 RX ADMIN — GALANTAMINE 8 MG: 4 TABLET, FILM COATED ORAL at 08:19

## 2019-03-18 RX ADMIN — GABAPENTIN 100 MG: 100 CAPSULE ORAL at 08:19

## 2019-03-18 RX ADMIN — TAMSULOSIN HYDROCHLORIDE 0.4 MG: 0.4 CAPSULE ORAL at 08:19

## 2019-03-18 RX ADMIN — APIXABAN 5 MG: 5 TABLET, FILM COATED ORAL at 21:41

## 2019-03-18 RX ADMIN — INSULIN LISPRO 2 UNITS: 100 INJECTION, SOLUTION INTRAVENOUS; SUBCUTANEOUS at 17:05

## 2019-03-18 RX ADMIN — ACETAMINOPHEN 650 MG: 325 TABLET, FILM COATED ORAL at 08:26

## 2019-03-18 RX ADMIN — CYANOCOBALAMIN TAB 1000 MCG 1000 MCG: 1000 TAB at 08:18

## 2019-03-18 RX ADMIN — LOSARTAN POTASSIUM 100 MG: 50 TABLET ORAL at 08:18

## 2019-03-18 RX ADMIN — HYDRALAZINE HYDROCHLORIDE 50 MG: 50 TABLET, FILM COATED ORAL at 21:41

## 2019-03-18 RX ADMIN — METOPROLOL SUCCINATE 50 MG: 50 TABLET, EXTENDED RELEASE ORAL at 08:19

## 2019-03-18 RX ADMIN — LEVOTHYROXINE SODIUM 50 MCG: 50 TABLET ORAL at 08:19

## 2019-03-18 RX ADMIN — Medication 10 ML: at 21:50

## 2019-03-18 RX ADMIN — ISOSORBIDE MONONITRATE 30 MG: 30 TABLET, EXTENDED RELEASE ORAL at 08:19

## 2019-03-18 RX ADMIN — GALANTAMINE 8 MG: 4 TABLET, FILM COATED ORAL at 17:05

## 2019-03-18 RX ADMIN — TRAMADOL HYDROCHLORIDE 50 MG: 50 TABLET, FILM COATED ORAL at 12:03

## 2019-03-18 RX ADMIN — AMIODARONE HYDROCHLORIDE 100 MG: 200 TABLET ORAL at 08:20

## 2019-03-18 RX ADMIN — ESCITALOPRAM OXALATE 15 MG: 10 TABLET ORAL at 08:18

## 2019-03-18 RX ADMIN — GABAPENTIN 100 MG: 100 CAPSULE ORAL at 21:41

## 2019-03-18 RX ADMIN — APIXABAN 5 MG: 5 TABLET, FILM COATED ORAL at 08:18

## 2019-03-18 NOTE — PROGRESS NOTES
Progress Note    Patient: Mary Traore MRN: [id number]  SSN: [id number]    YOB: 1950  Age: 78 y.o. Sex: male      Admit Date: 3/14/2019    LOS: 0 days      Mr. Edel Nina is a 77 y/o male with a h/o BPH, CAD, HTN, DM, cervical and lumbar radiculopathy s/p cervical and lumbar surgery in 1970s, TIA, AFib on Eliquis who presents with progressive weakness since 2- weeks ago. This has been a recurrent complaint. He was hospitalized from 2/3-2/5/19 and a pacemaker was placed for bradycardia. He developed AFib and was started on BB, amiodarone and Eliquis. He was weak then but was discharged home only to return with c/o weakness. CVA work up was negative. He was discharged to rehab and was there about 3 weeks. He has been home almost two weeks and has become weak again. His wife is frustrated and stated she cannot take care of him at home. On his current hospitalization, brain ct was normal. He has weakness over his upper and lower extremities. In view of his progression of symptoms a ct of his cervical and lumbar spine was done instead of MRI ( he has a spinal stimulator ), findings of advance multilevel cervical and lumbar spondylosis, left sided neural foraminal compromise L3-5. He is getting PT/OT. Plan for STR versus home PT. Subjective:   Patient examined at bedside. He had no acute complains. Feels better. Objective:     Vitals:    03/17/19 1956 03/17/19 2327 03/18/19 0305 03/18/19 0758   BP: 141/76 146/74 142/77 168/82   Pulse: 63 60 68 73   Resp: 18 18 18 18   Temp: 98.2 °F (36.8 °C) 98.7 °F (37.1 °C) 98.5 °F (36.9 °C) 98.5 °F (36.9 °C)   SpO2: 96% 98% 96% 95%   Weight:  102.3 kg (225 lb 8 oz)     Height:            Intake and Output:  Current Shift: 03/18 0701 - 03/18 1900  In: 120 [P.O.:120]  Out: -   Last three shifts: 03/16 1901 - 03/18 0700  In: 350 [P.O.:330;  I.V.:20]  Out: -     Physical Exam:   GENERAL: alert, cooperative, no distress, appears stated age  EYE: negative  LYMPHATIC: Cervical, supraclavicular, and axillary nodes normal.   THROAT & NECK: normal and no erythema or exudates noted. LUNG: clear to auscultation bilaterally  HEART: regular rate and rhythm, S1, S2 normal, no murmur, click, rub or gallop  ABDOMEN: soft, non-tender. Bowel sounds normal. No masses,  no organomegaly  EXTREMITIES:  extremities normal, atraumatic, no cyanosis or edema  SKIN: Normal.  NEUROLOGIC: oriented x 3, he has weakness over his upper and lower extremities, he is able to lift UE against gravity, but he is unable to do it with his Lower extremities. He is able to move them side ways. Sensitivity is preserved. PSYCHIATRIC: non focal    Lab/Data Review: All lab results for the last 24 hours reviewed. Assessment:     Principal Problem:    Generalized weakness (2/3/2019)    Active Problems:    Hypertension (1/12/2012)      Hyperlipidemia (5/2/2014)      History of CVA (cerebrovascular accident) (9/14/2016)      S/P placement of cardiac pacemaker (2/3/2019)      Atrial fibrillation ([facility] [address] (3/14/2019)        Plan:   -Generalized weakness:  He complains of chronic neck and back pain, also mild numbness of his legs  He underwent neck and lumbar back surgery in the 1970s. CT C-T-L spine showed multilevel disease. No acute fractures. Pain control  Continue PT/OT    -CAD, sp sent in 2008: asymptomatic:  Continue home meds    -Bradycardia/P a fibrillation, sp PM placement on 2/1/19: On eliquis, toprol      -HTN: home meds     -BPH: home meds     -AFib: Amio, BB, Eliquis     -Hypothyroidism: synthroid      -DM: insulin      DVT ppx: compression stockings.  On eliquis     Code status: DNR/DNI    Disposition: STR versus home PT.      Signed By: Renae Galvan MD     March 18, 2019

## 2019-03-18 NOTE — PROGRESS NOTES
Problem: Mobility Impaired (Adult and Pediatric)  Goal: *Acute Goals and Plan of Care (Insert Text)  LTG:  (1.)Mr. Jaskaran Ocampo will move from supine to sit and sit to supine , scoot up and down and roll side to side with CONTACT GUARD ASSIST within 7 treatment day(s). (2.)Mr. Jaskaran Ocampo will transfer from bed to chair and chair to bed with CONTACT GUARD ASSIST using the least restrictive device within 7 treatment day(s). (3.)Mr. Jaskaran Ocampo will ambulate with CONTACT GUARD ASSIST for 100 feet with the least restrictive device within 7 treatment day(s) while maintaining normal vital signs. (4.)Mr. Jaskaran Ocampo will perform 2 steps with MIN A within 7 treatment days for safety ascending and descending stairs for home.   _____________________________________________________________________________________________    PHYSICAL THERAPY: Daily Note and AM 3/18/2019  OBSERVATION: PT Visit Days : 1  Payor: Boni Silva / Plan: 490Laurie Galan PPO/PFFS / Product Type: [facility] Care Medicare /       NAME/AGE/GENDER: Mirlande Hernandez is a 78 y.o. male   PRIMARY DIAGNOSIS: Generalized weakness [R53.1] Generalized weakness Generalized weakness       ICD-10: Treatment Diagnosis:    · Generalized Muscle Weakness (M62.81)  · Difficulty in walking, Not elsewhere classified (R26.2)   Precaution/Allergies:  Fosinopril and Lisinopril      ASSESSMENT:     Mr. Jaskaran Ocampo agreeable to PT. Received up in the chair. Performed therex active assisted for most of it. Sit to stand with contact guard and gait with rolling walker 20 ft with contact guard. Tolerated increased activity very well. He was hurting in his back and neck but this is chronic. RN notified. This section established at most recent assessment   PROBLEM LIST (Impairments causing functional limitations):  1. Decreased Strength  2. Decreased ADL/Functional Activities  3. Decreased Transfer Abilities  4. Decreased Ambulation Ability/Technique  5. Decreased Balance  6.  Decreased Activity Tolerance  7. Increased Fatigue  8. Decreased Murray with Home Exercise Program  9. Decreased Cognition   INTERVENTIONS PLANNED: (Benefits and precautions of physical therapy have been discussed with the patient.)  1. Balance Exercise  2. Bed Mobility  3. Family Education  4. Gait Training  5. Home Exercise Program (HEP)  6. Therapeutic Activites  7. Therapeutic Exercise/Strengthening  8. Transfer Training     TREATMENT PLAN: Frequency/Duration: 3 times a week for duration of hospital stay  Rehabilitation Potential For Stated Goals: [place] REHABILITATION/EQUIPMENT: (at time of discharge pending progress): Due to the probability of continued deficits (see above) this patient will likely need continued skilled physical therapy after discharge. Equipment:    None at this time              HISTORY:   History of Present Injury/Illness (Reason for Referral):  Mr. Moi Lockwood is a 77 y/o male with a h/o BPH, CAD, HTN, DM, lumbar radiculopathy, TIA, AFib on Eliquis who presents with progressive weakness since this past Sunday. This has been a recurrent complaint. 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 He developed AFib and was started on BB, amiodarone and Eliquis. He was weak then but was discharged home only to return with c/o weakness. CT head and the rest of his w/u at that time was negative. He was discharged to rehab and was there about 3 weeks. He has been home almost two weeks but since this past Sunday has become weak again. Does have h/o CVA but no reported slurred speech or asymmetric weakness. Repeat head CT today shows chronic small vessel disease and unchanged right BG lacunar infarcts. He denies any N/V/D but has had poor PO intake. No major stressors or life changes noted but does appear weak and depressed. No fevers, chills or sick contacts that he's aware of. Unfortunately his wife is unable to provide for him like this at home.       Past Medical History/Comorbidities:   Mr. Mounika Hernandez  has a past medical history of Abdominal complaints (12/18/2013), Arthritis, Asthma, BPH (benign prostatic hyperplasia), CAD (coronary artery disease), Chronic pain, COPD, Depression (emotion) (12/18/2013), Diabetes ([facility] GERD (gastroesophageal reflux disease), Hypertension, Neuropathy, Neuropathy, Other ill-defined conditions(799.89), Post traumatic stress disorder (1/12/2012), PTSD (post-traumatic stress disorder), Seizures ([facility] [address] Stroke ([facility] Thrombocytopenia, unspecified ([facility] [address] (1/12/2012), and Thyroid disease. Mr. Mounika Hernandez  has a past surgical history that includes hx heent (2010); hx other surgical (1967/68); hx orthopaedic; bone marrow aspirate &biopsy (1/19/2012); pr cardiac surg procedure unlist; hx heart catheterization (4/23/2015); hx back surgery; and pr neurological procedure unlisted. Social History/Living Environment:   Home Environment: Private residence  # Steps to Enter: 2  Wheelchair Ramp: No  One/Two Story Residence: Two story, live on 1st floor  Lift Chair Available: No  Living Alone: No  Support Systems: Spouse/Significant Other/Partner  Patient Expects to be Discharged to[de-identified] Skilled nursing facility  Current DME Used/Available at Home: Commode, bedside, Grab bars, Shower chair, Walker, rolling  Tub or Shower Type: Shower  Prior Level of Function/Work/Activity:  Lives with spouse, hx CVA, L side weakness; uses RW for amb, does not drive, assist with ADLs  Personal Factors:          Sex:  male        Age:  78 y.o.         Overall Behavior:  agreeable   Number of Personal Factors/Comorbidities that affect the Plan of Care:  CVA, age, COPD, chronic pain 3+: HIGH COMPLEXITY   EXAMINATION:   Most Recent Physical Functioning:   Gross Assessment:                  Posture:     Balance:  Sitting: Impaired  Sitting - Static: Good (unsupported)  Sitting - Dynamic: Fair (occasional)  Standing: Impaired  Standing - Static: Fair  Standing - Dynamic : Fair Bed Mobility:     Wheelchair Mobility:     Transfers:  Sit to Stand: Contact guard assistance  Stand to Sit: Contact guard assistance  Gait:     Base of Support: Widened  Speed/Precious: Pace decreased (<100 feet/min)  Step Length: Right shortened;Left shortened  Gait Abnormalities: Decreased step clearance;Shuffling gait; Steppage gait  Distance (ft): 20 Feet (ft)  Assistive Device: Walker, rolling  Ambulation - Level of Assistance: Contact guard assistance  Interventions: Safety awareness training;Verbal cues      Body Structures Involved:  1. Muscles Body Functions Affected:  1. Movement Related Activities and Participation Affected:  1. General Tasks and Demands  2. Mobility  3. Self Care   Number of elements that affect the Plan of Care: 4+: HIGH COMPLEXITY   CLINICAL PRESENTATION:   Presentation: Stable and uncomplicated: LOW COMPLEXITY   CLINICAL DECISION MAKING:   [facility] AM-PAC 6 Clicks   Basic Mobility Inpatient Short Form  How much difficulty does the patient currently have. .. Unable A Lot A Little None   1. Turning over in bed (including adjusting bedclothes, sheets and blankets)? [] 1   [x] 2   [] 3   [] 4   2. Sitting down on and standing up from a chair with arms ( e.g., wheelchair, bedside commode, etc.)   [] 1   [x] 2   [] 3   [] 4   3. Moving from lying on back to sitting on the side of the bed? [] 1   [x] 2   [] 3   [] 4   How much help from another person does the patient currently need. .. Total A Lot A Little None   4. Moving to and from a bed to a chair (including a wheelchair)? [] 1   [] 2   [x] 3   [] 4   5. Need to walk in hospital room? [] 1   [] 2   [x] 3   [] 4   6. Climbing 3-5 steps with a railing? [] 1   [x] 2   [] 3   [] 4   © 2007, Trustees of [facility], under license to [employer].  All rights reserved      Score:  Initial: 14 Most Recent: X (Date: -- )    Interpretation of Tool:  Represents activities that are increasingly more difficult (i.e. Bed mobility, Transfers, Gait). Medical Necessity:     · Patient is expected to demonstrate progress in strength, range of motion, balance and coordination to decrease assistance required with overall functional mobility, transfers, ambulation. · Patient demonstrates good rehab potential due to higher previous functional level. Reason for Services/Other Comments:  · Patient continues to require present interventions due to patient's inability to perform bed mobility, transfers, ambulation safely and effectively. Use of outcome tool(s) and clinical judgement create a POC that gives a: Questionable prediction of patient's progress: MODERATE COMPLEXITY            TREATMENT:   (In addition to Assessment/Re-Assessment sessions the following treatments were rendered)   Pre-treatment Symptoms/Complaints:  No complaint other than back and neck pain which are chronic   Pain: Initial:   Pain Intensity 1: 7  Pain Location 1: Back, Neck  Pain Intervention(s) 1: Emotional support, Nurse notified  Post Session:  7/10     Therapeutic Activity: (    13): Therapeutic activities including Chair transfers and Ambulation on level ground to improve mobility. Required minimal Safety awareness training;Verbal cues to promote motor control of bilateral, upper extremity(s), lower extremity(s). Date:  3/18 Date:   Date:     Activity/Exercise Parameters Parameters Parameters   AP 10     heelsides  10 AA B     Abduction /adduction 10 AA B     LAQ 10 A     Marching  10 B difficult because he is raw between his legs. Therapeutic Exercise: (  10):  Exercises per grid below to improve strength. Required minimal visual and manual cues to promote proper body mechanics. Progressed repetitions as indicated.         Braces/Orthotics/Lines/Etc:   · O2 Device: Room air  Treatment/Session Assessment:    · Response to Treatment:  Tolerated well, very slow mobility, additional time required  · Interdisciplinary Collaboration: o Physical Therapist  o Occupational Therapist  o Registered Nurse  · After treatment position/precautions:   o Up in chair  o Bed alarm/tab alert on  o Bed/Chair-wheels locked  o Bed in low position  o Call light within reach  o RN notified   · Compliance with Program/Exercises: Will assess as treatment progresses  · Recommendations/Intent for next treatment session: \"Next visit will focus on advancements to more challenging activities\".   Total Treatment Duration:  PT Patient Time In/Time Out  Time In: 1120  Time Out: James Edmonds PT

## 2019-03-18 NOTE — PROGRESS NOTES
Problem: Falls - Risk of  Goal: *Absence of Falls  Document Cleo Fall Risk and appropriate interventions in the flowsheet. Outcome: Progressing Towards Goal  Fall Risk Interventions:  Mobility Interventions: Bed/chair exit alarm, Communicate number of staff needed for ambulation/transfer, Patient to call before getting OOB    Mentation Interventions: Bed/chair exit alarm, Door open when patient unattended, Reorient patient    Medication Interventions: Bed/chair exit alarm, Patient to call before getting OOB, Teach patient to arise slowly    Elimination Interventions: Bed/chair exit alarm, Call light in reach, Patient to call for help with toileting needs, Toileting schedule/hourly rounds, Urinal in reach    History of Falls Interventions: Bed/chair exit alarm, Door open when patient unattended, Investigate reason for fall        Problem: Pressure Injury - Risk of  Goal: *Prevention of pressure injury  Document Hugh Scale and appropriate interventions in the flowsheet.   Outcome: Progressing Towards Goal  Pressure Injury Interventions:  Sensory Interventions: Assess changes in LOC, Check visual cues for pain, Discuss PT/OT consult with provider    Moisture Interventions: Check for incontinence Q2 hours and as needed, Apply protective barrier, creams and emollients, Absorbent underpads    Activity Interventions: Assess need for specialty bed, Increase time out of bed, Pressure redistribution bed/mattress(bed type), PT/OT evaluation    Mobility Interventions: Assess need for specialty bed, Pressure redistribution bed/mattress (bed type), PT/OT evaluation    Nutrition Interventions: Document food/fluid/supplement intake, Offer support with meals,snacks and hydration    Friction and Shear Interventions: Apply protective barrier, creams and emollients, Foam dressings/transparent film/skin sealants

## 2019-03-18 NOTE — PROGRESS NOTES
END OF SHIFT NOTE:    Intake/Output  No intake/output data recorded. Voiding: YES  Catheter: NO  Drain:              Stool:  1 occurrences. Stool Assessment  Stool Color: Yellow (03/18/19 0329)  Stool Appearance: Loose(chunky) (03/18/19 0329)  Stool Amount: Large (03/18/19 0329)  Stool Source/Status: Rectum (03/18/19 0329)    Emesis:  0 occurrences. VITAL SIGNS  Patient Vitals for the past 12 hrs:   Temp Pulse Resp BP SpO2   03/18/19 0305 98.5 °F (36.9 °C) 68 18 142/77 96 %   03/17/19 2327 98.7 °F (37.1 °C) 60 18 146/74 98 %   03/17/19 1956 98.2 °F (36.8 °C) 63 18 141/76 96 %       Pain Assessment  Pain 1  Pain Scale 1: Numeric (0 - 10) (03/18/19 0220)  Pain Intensity 1: 0 (03/18/19 0220)  Patient Stated Pain Goal: 0 (03/18/19 0220)  Pain Reassessment 1: Yes (03/17/19 0914)  Pain Onset 1: chronic (03/15/19 0947)  Pain Location 1: Back (03/17/19 0830)  Pain Orientation 1: Lower (03/15/19 1519)  Pain Description 1: Aching (03/15/19 1519)  Pain Intervention(s) 1: Medication (see MAR) (03/17/19 0830)    Ambulating  No    Additional Information: Patient rested well. Had one large loose bowel movement this morning. Shift report given to oncoming nurse at the bedside.     Sherran Brittle

## 2019-03-18 NOTE — PROGRESS NOTES
Problem: Self Care Deficits Care Plan (Adult)  Goal: *Acute Goals and Plan of Care (Insert Text)  1. Patient will perform grooming with supervision within 7 days with equipment as needed. 2.  Patient will perform bathing with minimal assistance within 7 days with equipment as needed. 3.  Patient will perform lower body dressing and toileting with moderate assistance within 7 days with equipment as needed. 4.   Patient will perform toilet transfer with supervision within 7 days with equipment as needed. 5.  Pt will participate in B UE therapeutic exercises for 8 minutes with 3 rest breaks within 7 days. 6.  Pt and or caregiver to demonstrate and verbalize good understanding of recommendations for increasing safety with functional tasks within 7 days. OCCUPATIONAL THERAPY: Daily Note and AM 3/18/2019  OBSERVATION: OT Visit Days: 2  Payor: Roman Arreguin / Plan: [facility] [employer] MEDICARE CHOICE PPO/PFFS / Product Type: [employer] Medicare /      NAME/AGE/GENDER: Harsh Bolden is a 78 y.o. male   PRIMARY DIAGNOSIS:  Generalized weakness [R53.1] Generalized weakness Generalized weakness       ICD-10: Treatment Diagnosis:    · Stiffness of Left Shoulder, Not elsewhere classified (M25.612)  · Stiffness of Right Shoulder, Not elsewhere classified (M25.611)  · Generalized Muscle Weakness (M62.81)  · History of falling (Z91.81)   Precautions/Allergies:    FAlls Fosinopril and Lisinopril      ASSESSMENT:     Mr. David Peters admitted to hospital with above diagnosis. Patient reported that he has had a fall at home in past year. Patient lives at home with his wife, who assists him with ADLs. Pt completed ADL with the assistance listed below. Pt is progressing towards goals. Continue POC. This section established at most recent assessment   PROBLEM LIST (Impairments causing functional limitations):  1. Decreased Strength  2. Decreased ADL/Functional Activities  3. Decreased Transfer Abilities  4.  Decreased Ambulation Ability/Technique  5. Decreased Balance  6. Increased Pain  7. Decreased Activity Tolerance  8. Decreased Work Simplification/Energy Conservation Techniques  9. Increased Fatigue  10. Decreased Flexibility/Joint Mobility  11. Edema/Girth  12. Decreased [place] with Home Exercise Program  13. Decreased Cognition   INTERVENTIONS PLANNED: (Benefits and precautions of occupational therapy have been discussed with the patient.)  1. Activities of daily living training  2. Adaptive equipment training  3. Balance training  4. Clothing management  5. Cognitive training  6. Donning&doffing training  7. Group therapy  8. Hygiene training  9. Medication management training  10. Neuromuscular re-eduation  11. Re-evaluation  12. Therapeutic activity  13. Therapeutic exercise     TREATMENT PLAN: Frequency/Duration: Follow patient 3x's/wk to address above goals. Rehabilitation Potential For Stated Goals: Good     RECOMMENDED REHABILITATION/EQUIPMENT: (at time of discharge pending progress): Due to the probability of continued deficits (see above) this patient will likely need continued skilled occupational therapy after discharge. Equipment:    None at this time              OCCUPATIONAL PROFILE AND HISTORY:   History of Present Injury/Illness (Reason for Referral):  Mr. Cher Swann is a 77 y/o male with a h/o BPH, CAD, HTN, DM, lumbar radiculopathy, TIA, AFib on Eliquis who presents with progressive weakness since this past Sunday. This has been a recurrent complaint. 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 He developed AFib and was started on BB, amiodarone and Eliquis. He was weak then but was discharged home only to return with c/o weakness. CT head and the rest of his w/u at that time was negative. He was discharged to rehab and was there about 3 weeks. He has been home almost two weeks but since this past Sunday has become weak again.  Does have h/o CVA but no reported slurred speech or asymmetric weakness. Repeat head CT today shows chronic small vessel disease and unchanged right BG lacunar infarcts. He denies any N/V/D but has had poor PO intake. No major stressors or life changes noted but does appear weak and depressed. No fevers, chills or sick contacts that he's aware of. Unfortunately his wife is unable to provide for him like this at home. Past Medical History/Comorbidities:   Mr. Cesia Granda  has a past medical history of Abdominal complaints (12/18/2013), Arthritis, Asthma, BPH (benign prostatic hyperplasia), CAD (coronary artery disease), Chronic pain, COPD, Depression (emotion) (12/18/2013), Diabetes ([address] GERD (gastroesophageal reflux disease), Hypertension, Neuropathy, Neuropathy, Other ill-defined conditions(799.89), Post traumatic stress disorder (1/12/2012), PTSD (post-traumatic stress disorder), Seizures ([facility] [address] Stroke ([facility] [address] Thrombocytopenia, unspecified ([facility] [address] (1/12/2012), and Thyroid disease. Mr. Cesia Granda  has a past surgical history that includes hx heent (2010); hx other surgical (1967/68); hx orthopaedic; bone marrow aspirate &biopsy (1/19/2012); pr cardiac surg procedure unlist; hx heart catheterization (4/23/2015); hx back surgery; and pr neurological procedure unlisted. Social History/Living Environment:   Home Environment: Private residence  # Steps to Enter: 2  Wheelchair Ramp: No  One/Two Story Residence: Two story, live on 1st floor  Lift Chair Available: No  Living Alone: No  Support Systems: Spouse/Significant Other/Partner  Patient Expects to be Discharged to[de-identified] Skilled nursing facility  Current DME Used/Available at Home: Commode, bedside, Grab bars, Shower chair, Walker, rolling  Tub or Shower Type: Shower  Prior Level of Function/Work/Activity:  Patient reported that he has had a fall at home in past year. Patient lives at home with his wife, who assists him with ADLs.   Patient stated that sometime he takes showers, where he steps into shower, and sits onto a shower chair with use of grab bars with assist from his wife. Patient stated that he was able to ambulate with RW. Patient has former CVA affecting his L UE, and L UE shoulder AROM is limited & L UE coordination impaired. Number of Personal Factors/Comorbidities that affect the Plan of Care: Expanded review of therapy/medical records (1-2):  MODERATE COMPLEXITY   ASSESSMENT OF OCCUPATIONAL PERFORMANCE[de-identified]   Activities of Daily Living:   Basic ADLs (From Assessment) Complex ADLs (From Assessment)   Feeding: Minimum assistance  Oral Facial Hygiene/Grooming: Moderate assistance  Bathing: Maximum assistance  Upper Body Dressing: Moderate assistance  Lower Body Dressing: Total assistance  Toileting: Total assistance Instrumental ADL  Meal Preparation: Total assistance  Homemaking: Total assistance  Medication Management: Maximum assistance  Financial Management: Total assistance   Grooming/Bathing/Dressing Activities of Daily Living   Grooming  Washing Face: Supervision/set-up Cognitive Retraining  Safety/Judgement: Fall prevention   Upper Body Bathing  Bathing Assistance: Supervision  Position Performed: Seated in chair     Lower Body Bathing  Bathing Assistance: Moderate assistance  Perineal  : Moderate assistance  Lower Body : Minimum assistance     Upper Body Dressing Assistance  Dressing Assistance: [place]: Supervision/ set-up  Pullover Shirt: Supervision/set-up     Lower Body Dressing Assistance  Protective Undergarmet: Maximum assistance  Socks: Maximum assistance Bed/Mat Mobility  Supine to Sit: Minimum assistance  Sit to Supine: Minimum assistance  Sit to Stand: Minimum assistance  Stand to Sit: Contact guard assistance  Bed to Chair: Contact guard assistance       Most Recent Physical Functioning:   Gross Assessment:                  Posture:  Posture (WDL): Exceptions to WDL  Posture Assessment:  Forward head, Rounded shoulders  Balance:  Sitting: Impaired  Sitting - Static: Good (unsupported)  Sitting - Dynamic: Fair (occasional)  Standing: Impaired  Standing - Static: Fair  Standing - Dynamic : Fair Bed Mobility:  Supine to Sit: Minimum assistance  Sit to Supine: Minimum assistance  Wheelchair Mobility:     Transfers:  Sit to Stand: Minimum assistance  Stand to Sit: Contact guard assistance  Bed to Chair: Contact guard assistance            Patient Vitals for the past 6 hrs:   BP BP Patient Position SpO2 Pulse   19 0758 168/82 At rest 95 % 73       Mental Status  Neurologic State: Alert  Orientation Level: Oriented to person  Cognition: Follows commands  Perception: Verbal, Tactile  Perseveration: Tactile cues provided, Verbal cues provided  Safety/Judgement: Fall prevention                          Physical Skills Involved:  1. Range of Motion  2. Balance  3. Strength  4. Activity Tolerance  5. Fine Motor Control  6. Gross Motor Control  7. Pain (Chronic)  8. Edema Cognitive Skills Affected (resulting in the inability to perform in a timely and safe manner):  1. Executive Function  2. Immediate Memory  3. Sustained Attention  4. Divided Attention  5. Increased time to process Psychosocial Skills Affected:  1. Habits/Routines  2. Social Interaction   Number of elements that affect the Plan of Care: 5+:  HIGH COMPLEXITY   CLINICAL DECISION MAKIN [facility] [address] [id number] AM-PAC 6 Clicks   Daily Activity Inpatient Short Form  How much help from another person does the patient currently need. .. Total A Lot A Little None   1. Putting on and taking off regular lower body clothing? [x] 1   [] 2   [] 3   [] 4   2. Bathing (including washing, rinsing, drying)? [] 1   [x] 2   [] 3   [] 4   3. Toileting, which includes using toilet, bedpan or urinal?   [x] 1   [] 2   [] 3   [] 4   4. Putting on and taking off regular upper body clothing? [] 1   [x] 2   [] 3   [] 4   5. Taking care of personal grooming such as brushing teeth? [] 1   [x] 2   [] 3   [] 4   6. Eating meals?    [] 1 [] 2   [x] 3   [] 4   © 2007, Trustees of [address] [place] [address] [address] [address], under license to [employer]. All rights reserved      Score:  Initial: 13 Most Recent: X (Date: -- )    Interpretation of Tool:  Represents activities that are increasingly more difficult (i.e. Bed mobility, Transfers, Gait). Medical Necessity:     · Patient demonstrates good rehab potential due to higher previous functional level. Reason for Services/Other Comments:  · Patient continues to require skilled intervention due to patient's inability to take care of self. Use of outcome tool(s) and clinical judgement create a POC that gives a: MODERATE COMPLEXITY         TREATMENT:   (In addition to Assessment/Re-Assessment sessions the following treatments were rendered)     Pre-treatment Symptoms/Complaints:    Pain: Initial:   Pain Intensity 1: 3  Pain Location 1: Back  Post Session:  7     Self Care: (38): Procedure(s) (per grid) utilized to improve and/or restore self-care/home management as related to dressing, bathing and grooming. Required max verbal and manual cueing to facilitate activities of daily living skills. Braces/Orthotics/Lines/Etc:   · IV  · O2 Device: Room air  Treatment/Session Assessment:    · Response to Treatment:  positive  · Interdisciplinary Collaboration:   o Certified Occupational Therapy Assistant  o Registered Nurse  · After treatment position/precautions:   o Up in chair  o Bed alarm/tab alert on  o Bed/Chair-wheels locked  o Bed in low position  o Call light within reach  o RN notified   · Compliance with Program/Exercises: Compliant all of the time, Will assess as treatment progresses. · Recommendations/Intent for next treatment session: \"Next visit will focus on advancements to more challenging activities and reduction in assistance provided\".   Total Treatment Duration:  OT Patient Time In/Time Out  Time In: 1030  Time Out: [address] [facility]

## 2019-03-18 NOTE — PROGRESS NOTES
JESSICA met with pt's spouse at bedside to discuss discharge planning. Spouse stated she would like pt to go to rehab at [facility]. SW discussed with her about talking to facility about eventual long term care, since she is no longer able to take care of him at home. She stated he is eligible for VA benefits and she is in the process of completing paperwork. JESSICA will reach out to [place] [id number] regarding auth request, being that pt has been here on observation since 3/14. JESSICA will continue to monitor and follow up.

## 2019-03-19 ENCOUNTER — HOME CARE VISIT (OUTPATIENT)
Dept: SCHEDULING | Facility: HOME HEALTH | Age: 69
End: 2019-03-19
Payer: MEDICARE

## 2019-03-19 LAB
GLUCOSE BLD STRIP.AUTO-MCNC: 130 MG/DL (ref 65–100)
GLUCOSE BLD STRIP.AUTO-MCNC: 178 MG/DL (ref 65–100)
GLUCOSE BLD STRIP.AUTO-MCNC: 210 MG/DL (ref 65–100)
GLUCOSE BLD STRIP.AUTO-MCNC: 230 MG/DL (ref 65–100)

## 2019-03-19 PROCEDURE — 74011250637 HC RX REV CODE- 250/637: Performed by: INTERNAL MEDICINE

## 2019-03-19 PROCEDURE — 3331090001 HH PPS REVENUE CREDIT

## 2019-03-19 PROCEDURE — 82962 GLUCOSE BLOOD TEST: CPT

## 2019-03-19 PROCEDURE — 74011636637 HC RX REV CODE- 636/637: Performed by: INTERNAL MEDICINE

## 2019-03-19 PROCEDURE — 3331090002 HH PPS REVENUE DEBIT

## 2019-03-19 PROCEDURE — 99218 HC RM OBSERVATION: CPT

## 2019-03-19 RX ORDER — ACETAMINOPHEN 500 MG
500 TABLET ORAL 3 TIMES DAILY
Status: DISCONTINUED | OUTPATIENT
Start: 2019-03-19 | End: 2019-03-20 | Stop reason: HOSPADM

## 2019-03-19 RX ADMIN — GABAPENTIN 100 MG: 100 CAPSULE ORAL at 21:17

## 2019-03-19 RX ADMIN — LOSARTAN POTASSIUM 100 MG: 50 TABLET ORAL at 08:36

## 2019-03-19 RX ADMIN — METOPROLOL SUCCINATE 50 MG: 50 TABLET, EXTENDED RELEASE ORAL at 08:36

## 2019-03-19 RX ADMIN — GALANTAMINE 8 MG: 4 TABLET, FILM COATED ORAL at 17:00

## 2019-03-19 RX ADMIN — CYANOCOBALAMIN TAB 1000 MCG 1000 MCG: 1000 TAB at 08:36

## 2019-03-19 RX ADMIN — APIXABAN 5 MG: 5 TABLET, FILM COATED ORAL at 08:36

## 2019-03-19 RX ADMIN — HYDRALAZINE HYDROCHLORIDE 50 MG: 50 TABLET, FILM COATED ORAL at 17:00

## 2019-03-19 RX ADMIN — ESCITALOPRAM OXALATE 15 MG: 10 TABLET ORAL at 08:35

## 2019-03-19 RX ADMIN — GABAPENTIN 100 MG: 100 CAPSULE ORAL at 08:35

## 2019-03-19 RX ADMIN — ACETAMINOPHEN 500 MG: 500 TABLET, FILM COATED ORAL at 17:00

## 2019-03-19 RX ADMIN — HYDRALAZINE HYDROCHLORIDE 50 MG: 50 TABLET, FILM COATED ORAL at 21:17

## 2019-03-19 RX ADMIN — GALANTAMINE 8 MG: 4 TABLET, FILM COATED ORAL at 08:35

## 2019-03-19 RX ADMIN — Medication 10 ML: at 05:26

## 2019-03-19 RX ADMIN — CLOPIDOGREL BISULFATE 75 MG: 75 TABLET, FILM COATED ORAL at 08:35

## 2019-03-19 RX ADMIN — TRAMADOL HYDROCHLORIDE 50 MG: 50 TABLET, FILM COATED ORAL at 08:35

## 2019-03-19 RX ADMIN — APIXABAN 5 MG: 5 TABLET, FILM COATED ORAL at 21:17

## 2019-03-19 RX ADMIN — HYDRALAZINE HYDROCHLORIDE 50 MG: 50 TABLET, FILM COATED ORAL at 08:36

## 2019-03-19 RX ADMIN — INSULIN LISPRO 2 UNITS: 100 INJECTION, SOLUTION INTRAVENOUS; SUBCUTANEOUS at 21:17

## 2019-03-19 RX ADMIN — AMLODIPINE BESYLATE 10 MG: 10 TABLET ORAL at 08:35

## 2019-03-19 RX ADMIN — RISPERIDONE 1 MG: 1 TABLET ORAL at 08:35

## 2019-03-19 RX ADMIN — PANTOPRAZOLE SODIUM 40 MG: 40 TABLET, DELAYED RELEASE ORAL at 08:37

## 2019-03-19 RX ADMIN — INSULIN LISPRO 4 UNITS: 100 INJECTION, SOLUTION INTRAVENOUS; SUBCUTANEOUS at 11:58

## 2019-03-19 RX ADMIN — ISOSORBIDE MONONITRATE 30 MG: 30 TABLET, EXTENDED RELEASE ORAL at 08:36

## 2019-03-19 RX ADMIN — FUROSEMIDE 20 MG: 20 TABLET ORAL at 08:36

## 2019-03-19 RX ADMIN — AMIODARONE HYDROCHLORIDE 100 MG: 200 TABLET ORAL at 08:36

## 2019-03-19 RX ADMIN — TAMSULOSIN HYDROCHLORIDE 0.4 MG: 0.4 CAPSULE ORAL at 08:36

## 2019-03-19 RX ADMIN — LEVOTHYROXINE SODIUM 50 MCG: 50 TABLET ORAL at 08:36

## 2019-03-19 RX ADMIN — GABAPENTIN 100 MG: 100 CAPSULE ORAL at 17:00

## 2019-03-19 RX ADMIN — TRAMADOL HYDROCHLORIDE 50 MG: 50 TABLET, FILM COATED ORAL at 19:22

## 2019-03-19 RX ADMIN — INSULIN LISPRO 4 UNITS: 100 INJECTION, SOLUTION INTRAVENOUS; SUBCUTANEOUS at 17:00

## 2019-03-19 RX ADMIN — ACETAMINOPHEN 500 MG: 500 TABLET, FILM COATED ORAL at 21:17

## 2019-03-19 RX ADMIN — Medication 10 ML: at 21:17

## 2019-03-19 NOTE — PROGRESS NOTES
Problem: Falls - Risk of  Goal: *Absence of Falls  Document Cleo Fall Risk and appropriate interventions in the flowsheet. Outcome: Progressing Towards Goal  Fall Risk Interventions:  Mobility Interventions: Communicate number of staff needed for ambulation/transfer, Patient to call before getting OOB, Bed/chair exit alarm    Mentation Interventions: Bed/chair exit alarm, Door open when patient unattended, Reorient patient    Medication Interventions: Bed/chair exit alarm, Patient to call before getting OOB, Teach patient to arise slowly    Elimination Interventions: Bed/chair exit alarm, Call light in reach, Patient to call for help with toileting needs, Toileting schedule/hourly rounds    History of Falls Interventions: Bed/chair exit alarm, Consult care management for discharge planning, Evaluate medications/consider consulting pharmacy, Room close to nurse's station        Problem: Pressure Injury - Risk of  Goal: *Prevention of pressure injury  Document Hugh Scale and appropriate interventions in the flowsheet.   Outcome: Progressing Towards Goal  Pressure Injury Interventions:  Sensory Interventions: Assess changes in LOC, Check visual cues for pain, Discuss PT/OT consult with provider    Moisture Interventions: Absorbent underpads, Apply protective barrier, creams and emollients, Check for incontinence Q2 hours and as needed    Activity Interventions: Assess need for specialty bed, Increase time out of bed, Pressure redistribution bed/mattress(bed type), PT/OT evaluation    Mobility Interventions: Assess need for specialty bed, HOB 30 degrees or less, Pressure redistribution bed/mattress (bed type), PT/OT evaluation    Nutrition Interventions: Document food/fluid/supplement intake, Offer support with meals,snacks and hydration    Friction and Shear Interventions: Apply protective barrier, creams and emollients, Foam dressings/transparent film/skin sealants, HOB 30 degrees or less

## 2019-03-19 NOTE — PROGRESS NOTES
Progress Note Patient: Prateek Brown MRN: [id number]  SSN: [id number] YOB: 1950  Age: 78 y.o. Sex: male Admit Date: 3/14/2019 LOS: 0 days  [facility] is a 77 y/o male with a h/o BPH, CAD, HTN, DM, cervical and lumbar radiculopathy s/p cervical and lumbar surgery in 1970s, TIA, AFib on Eliquis who presents with progressive weakness since 2- weeks ago. This has been a recurrent complaint. He was hospitalized from 2/3-2/5/19 and a pacemaker was placed for bradycardia. He developed AFib and was started on BB, amiodarone and Eliquis. He was weak then but was discharged home only to return with c/o weakness. CVA work up was negative. He was discharged to rehab and was there about 3 weeks. He has been home almost two weeks and has become weak again. His wife is frustrated and stated she cannot take care of him at home. On his current hospitalization, brain ct was normal. He has weakness over his upper and lower extremities. In view of his progression of symptoms a ct of his cervical and lumbar spine was done instead of MRI ( he has a spinal stimulator ), findings of advance multilevel cervical and lumbar spondylosis, left sided neural foraminal compromise L3-5. He is getting PT/OT. Plan for STR versus home PT. Subjective:  
 
Patient examined at bedside with family present. No acute events overnight. No fevers/chills, abdominal pain, chest pain, nausea/vomiting, diarrhea. Objective:  
 
Vitals:  
 03/18/19 2324 03/19/19 0318 03/19/19 0744 03/19/19 1121 BP: 142/71 131/71 163/77 124/68 Pulse: 61 61 68 61 Resp: 18 18 18 18 Temp: 98.3 °F (36.8 °C) 98.4 °F (36.9 °C) 99 °F (37.2 °C) 98.6 °F (37 °C) SpO2: 98% 99% 97% 98% Weight:      
Height:      
  
 
Intake and Output: 
Current Shift: 03/19 0701 - 03/19 1900 In: [address] [P.O.:712] Out: - Last three shifts: 03/17 1901 - 03/19 0700 In: 440 [P.O.:440] Out: - Physical Exam: GENERAL: alert, cooperative, no distress, appears stated age EYE: negative LYMPHATIC: Cervical, supraclavicular, and axillary nodes normal.  
THROAT & NECK: normal and no erythema or exudates noted. LUNG: clear to auscultation bilaterally HEART: regular rate and rhythm, S1, S2 normal, no murmur, click, rub or gallop ABDOMEN: soft, non-tender. Bowel sounds normal. No masses,  no organomegaly EXTREMITIES:  extremities normal, atraumatic, no cyanosis or edema SKIN: Normal. 
NEUROLOGIC: oriented x 3, he has weakness over his upper and lower extremities, he is able to lift UE against gravity, but he is unable to do it with his Lower extremities. He is able to move them side ways. Sensitivity is preserved. PSYCHIATRIC: non focal 
 
Lab/Data Review: All lab results for the last 24 hours reviewed. Assessment:  
 
Principal Problem: 
  Generalized weakness (2/3/2019) Active Problems: Hypertension (1/12/2012) Hyperlipidemia (5/2/2014) History of CVA (cerebrovascular accident) (9/14/2016) S/P placement of cardiac pacemaker (2/3/2019) Atrial fibrillation ([facility] [address] (3/14/2019) Plan: # Generalized weakness, recurrent - patient with chronic neck and back pain with mild numbness of legs 
- repeat CT imagine of his C-T-L spine showing multilevel disease without acute fractures 
- will change PRN Tylenol to scheduled Tylenol 500 mg TID 
- will recheck Hgb/Hct with iron/B12/folate studies tomorrow 
- no obvious infectious etiology 
- consult PT/OT # CAD, s/p stent in 2008 
- currently asymptomatic 
- continue home meds # Bradycardia and atrial fibrillation, sp PM placement on 2/1/19: 
- continue home Toprol and Eliquis 
  
# HTN 
- continue home meds 
  
# BPH 
- continue home meds # Atrial fibrillation 
- continue with amiodarone, BB, and Eliquis # Hypothyroidism 
- continue home Synthroid # DM type II 
- continue with SSI and serial CBGs F/E/N: no fluids, replete electrolytes as needed, cardiac diet Ppx: Eliquis for VTE Code Status: DNR/DNI Disposition: patient medically stable for discharge. Awaiting STR placement. PT/OT consults. All questions answered.  
  
Signed By: Anabella Yoon DO March 19, 2019

## 2019-03-19 NOTE — PROGRESS NOTES
JESSICA submitted ppwk to [place] for auth. Pt was approved for 3 days 3/19-3/21. [phone]  568 minutes per week. Please fax PT/OT notes to [phone]. JESSICA called [facility]. However, they stated they do not have a male bed available. SW will talk with spouse regarding second choice for SNF. JESSICA will continue to monitor and follow up.

## 2019-03-19 NOTE — PROGRESS NOTES
END OF SHIFT NOTE:    Intake/Output  03/18 1901 - 03/19 0700  In: 120 [P.O.:120]  Out: -    Voiding: YES  Catheter: NO  Drain:              Stool:  0 occurrences. Stool Assessment  Stool Color: Yellow (03/18/19 0329)  Stool Appearance: Loose(chunky) (03/18/19 0329)  Stool Amount: Large (03/18/19 0329)  Stool Source/Status: Rectum (03/18/19 0329)    Emesis:  0 occurrences. VITAL SIGNS  Patient Vitals for the past 12 hrs:   Temp Pulse Resp BP SpO2   03/19/19 0318 98.4 °F (36.9 °C) 61 18 131/71 99 %   03/18/19 2324 98.3 °F (36.8 °C) 61 18 142/71 98 %   03/18/19 1916 97.4 °F (36.3 °C) 60 18 145/72 99 %       Pain Assessment  Pain 1  Pain Scale 1: Numeric (0 - 10) (03/19/19 0220)  Pain Intensity 1: 0 (03/19/19 0220)  Patient Stated Pain Goal: 0 (03/19/19 0220)  Pain Reassessment 1: Patient sleeping (03/18/19 0900)  Pain Onset 1: chronic (03/18/19 1143)  Pain Location 1: Back (03/18/19 1226)  Pain Orientation 1: Lower (03/15/19 1519)  Pain Description 1: Aching (03/18/19 1204)  Pain Intervention(s) 1: Medication (see MAR) (03/18/19 1204)    Ambulating  No    Additional Information: Patient rested well. Uneventful night. Shift report given to oncoming nurse at the bedside.     Dinah Lazar

## 2019-03-19 NOTE — PROGRESS NOTES
Problem: Falls - Risk of  Goal: *Absence of Falls  Fall Risk Interventions:  Mobility Interventions: Bed/chair exit alarm, Communicate number of staff needed for ambulation/transfer, Patient to call before getting OOB    Mentation Interventions: Bed/chair exit alarm, Door open when patient unattended, Reorient patient    Medication Interventions: Bed/chair exit alarm, Patient to call before getting OOB, Teach patient to arise slowly    Elimination Interventions: Bed/chair exit alarm, Call light in reach, Patient to call for help with toileting needs, Toileting schedule/hourly rounds, Urinal in reach    History of Falls Interventions: Bed/chair exit alarm, Door open when patient unattended, Investigate reason for fall

## 2019-03-20 ENCOUNTER — HOME CARE VISIT (OUTPATIENT)
Dept: HOME HEALTH SERVICES | Facility: HOME HEALTH | Age: 69
End: 2019-03-20
Payer: MEDICARE

## 2019-03-20 VITALS
SYSTOLIC BLOOD PRESSURE: 141 MMHG | TEMPERATURE: 98.6 F | HEART RATE: 64 BPM | OXYGEN SATURATION: 97 % | HEIGHT: 73 IN | WEIGHT: 225.75 LBS | BODY MASS INDEX: 29.92 KG/M2 | RESPIRATION RATE: 18 BRPM | DIASTOLIC BLOOD PRESSURE: 69 MMHG

## 2019-03-20 LAB
ANION GAP SERPL CALC-SCNC: 8 MMOL/L (ref 7–16)
BASOPHILS # BLD: 0.1 K/UL (ref 0–0.2)
BASOPHILS NFR BLD: 1 % (ref 0–2)
BUN SERPL-MCNC: 13 MG/DL (ref 8–23)
CALCIUM SERPL-MCNC: 8.4 MG/DL (ref 8.3–10.4)
CHLORIDE SERPL-SCNC: 108 MMOL/L (ref 98–107)
CO2 SERPL-SCNC: 28 MMOL/L (ref 21–32)
CREAT SERPL-MCNC: 0.82 MG/DL (ref 0.8–1.5)
DIFFERENTIAL METHOD BLD: ABNORMAL
EOSINOPHIL # BLD: 0.2 K/UL (ref 0–0.8)
EOSINOPHIL NFR BLD: 5 % (ref 0.5–7.8)
ERYTHROCYTE [DISTWIDTH] IN BLOOD BY AUTOMATED COUNT: 14.3 % (ref 11.9–14.6)
FERRITIN SERPL-MCNC: 22 NG/ML (ref 8–388)
FOLATE SERPL-MCNC: 10.1 NG/ML (ref 3.1–17.5)
GLUCOSE BLD STRIP.AUTO-MCNC: 124 MG/DL (ref 65–100)
GLUCOSE BLD STRIP.AUTO-MCNC: 200 MG/DL (ref 65–100)
GLUCOSE SERPL-MCNC: 115 MG/DL (ref 65–100)
HCT VFR BLD AUTO: 35.8 % (ref 41.1–50.3)
HGB BLD-MCNC: 11.5 G/DL (ref 13.6–17.2)
IMM GRANULOCYTES # BLD AUTO: 0 K/UL (ref 0–0.5)
IMM GRANULOCYTES NFR BLD AUTO: 0 % (ref 0–5)
IRON SERPL-MCNC: 53 UG/DL (ref 35–150)
LYMPHOCYTES # BLD: 1.8 K/UL (ref 0.5–4.6)
LYMPHOCYTES NFR BLD: 38 % (ref 13–44)
MCH RBC QN AUTO: 30 PG (ref 26.1–32.9)
MCHC RBC AUTO-ENTMCNC: 32.1 G/DL (ref 31.4–35)
MCV RBC AUTO: 93.5 FL (ref 79.6–97.8)
MONOCYTES # BLD: 0.5 K/UL (ref 0.1–1.3)
MONOCYTES NFR BLD: 10 % (ref 4–12)
NEUTS SEG # BLD: 2.3 K/UL (ref 1.7–8.2)
NEUTS SEG NFR BLD: 47 % (ref 43–78)
NRBC # BLD: 0 K/UL (ref 0–0.2)
PLATELET # BLD AUTO: 210 K/UL (ref 150–450)
PMV BLD AUTO: 11.3 FL (ref 9.4–12.3)
POTASSIUM SERPL-SCNC: 3 MMOL/L (ref 3.5–5.1)
RBC # BLD AUTO: 3.83 M/UL (ref 4.23–5.6)
SODIUM SERPL-SCNC: 144 MMOL/L (ref 136–145)
TRANSFERRIN SERPL-MCNC: 179 MG/DL (ref 202–364)
VIT B12 SERPL-MCNC: 2416 PG/ML (ref 193–986)
WBC # BLD AUTO: 4.8 K/UL (ref 4.3–11.1)

## 2019-03-20 PROCEDURE — 36415 COLL VENOUS BLD VENIPUNCTURE: CPT

## 2019-03-20 PROCEDURE — 74011250637 HC RX REV CODE- 250/637: Performed by: INTERNAL MEDICINE

## 2019-03-20 PROCEDURE — 82962 GLUCOSE BLOOD TEST: CPT

## 2019-03-20 PROCEDURE — 83540 ASSAY OF IRON: CPT

## 2019-03-20 PROCEDURE — 74011636637 HC RX REV CODE- 636/637: Performed by: INTERNAL MEDICINE

## 2019-03-20 PROCEDURE — 99218 HC RM OBSERVATION: CPT

## 2019-03-20 PROCEDURE — 85025 COMPLETE CBC W/AUTO DIFF WBC: CPT

## 2019-03-20 PROCEDURE — 82607 VITAMIN B-12: CPT

## 2019-03-20 PROCEDURE — 3331090001 HH PPS REVENUE CREDIT

## 2019-03-20 PROCEDURE — 80048 BASIC METABOLIC PNL TOTAL CA: CPT

## 2019-03-20 PROCEDURE — 82746 ASSAY OF FOLIC ACID SERUM: CPT

## 2019-03-20 PROCEDURE — 84466 ASSAY OF TRANSFERRIN: CPT

## 2019-03-20 PROCEDURE — 82728 ASSAY OF FERRITIN: CPT

## 2019-03-20 PROCEDURE — 3331090002 HH PPS REVENUE DEBIT

## 2019-03-20 RX ORDER — POTASSIUM CHLORIDE 20 MEQ/1
40 TABLET, EXTENDED RELEASE ORAL
Status: COMPLETED | OUTPATIENT
Start: 2019-03-20 | End: 2019-03-20

## 2019-03-20 RX ORDER — AMLODIPINE BESYLATE 10 MG/1
10 TABLET ORAL DAILY
Qty: 30 TAB | Refills: 0 | Status: ON HOLD
Start: 2019-03-21 | End: 2019-04-08

## 2019-03-20 RX ORDER — GABAPENTIN 100 MG/1
100 CAPSULE ORAL 3 TIMES DAILY
Qty: 90 CAP | Refills: 0 | Status: SHIPPED
Start: 2019-03-20 | End: 2019-04-19

## 2019-03-20 RX ORDER — HYDRALAZINE HYDROCHLORIDE 50 MG/1
50 TABLET, FILM COATED ORAL 3 TIMES DAILY
Qty: 90 TAB | Refills: 0 | Status: SHIPPED
Start: 2019-03-20 | End: 2019-04-19

## 2019-03-20 RX ORDER — LEVOTHYROXINE SODIUM 50 UG/1
50 TABLET ORAL
Qty: 30 TAB | Refills: 0 | Status: SHIPPED
Start: 2019-03-21 | End: 2019-04-20

## 2019-03-20 RX ADMIN — ESCITALOPRAM OXALATE 15 MG: 10 TABLET ORAL at 09:00

## 2019-03-20 RX ADMIN — CYANOCOBALAMIN TAB 1000 MCG 1000 MCG: 1000 TAB at 09:00

## 2019-03-20 RX ADMIN — RISPERIDONE 1 MG: 1 TABLET ORAL at 09:04

## 2019-03-20 RX ADMIN — PANTOPRAZOLE SODIUM 40 MG: 40 TABLET, DELAYED RELEASE ORAL at 09:04

## 2019-03-20 RX ADMIN — APIXABAN 5 MG: 5 TABLET, FILM COATED ORAL at 08:59

## 2019-03-20 RX ADMIN — LEVOTHYROXINE SODIUM 50 MCG: 50 TABLET ORAL at 08:07

## 2019-03-20 RX ADMIN — FUROSEMIDE 20 MG: 20 TABLET ORAL at 09:01

## 2019-03-20 RX ADMIN — GALANTAMINE 8 MG: 4 TABLET, FILM COATED ORAL at 09:02

## 2019-03-20 RX ADMIN — GABAPENTIN 100 MG: 100 CAPSULE ORAL at 09:01

## 2019-03-20 RX ADMIN — Medication 10 ML: at 05:24

## 2019-03-20 RX ADMIN — CLOPIDOGREL BISULFATE 75 MG: 75 TABLET, FILM COATED ORAL at 08:59

## 2019-03-20 RX ADMIN — POTASSIUM CHLORIDE 40 MEQ: 20 TABLET, EXTENDED RELEASE ORAL at 10:20

## 2019-03-20 RX ADMIN — METOPROLOL SUCCINATE 50 MG: 50 TABLET, EXTENDED RELEASE ORAL at 09:04

## 2019-03-20 RX ADMIN — ISOSORBIDE MONONITRATE 30 MG: 30 TABLET, EXTENDED RELEASE ORAL at 09:03

## 2019-03-20 RX ADMIN — AMIODARONE HYDROCHLORIDE 100 MG: 200 TABLET ORAL at 08:58

## 2019-03-20 RX ADMIN — TAMSULOSIN HYDROCHLORIDE 0.4 MG: 0.4 CAPSULE ORAL at 09:04

## 2019-03-20 RX ADMIN — AMLODIPINE BESYLATE 10 MG: 10 TABLET ORAL at 08:59

## 2019-03-20 RX ADMIN — HYDRALAZINE HYDROCHLORIDE 50 MG: 50 TABLET, FILM COATED ORAL at 09:03

## 2019-03-20 RX ADMIN — ACETAMINOPHEN 500 MG: 500 TABLET, FILM COATED ORAL at 08:58

## 2019-03-20 RX ADMIN — INSULIN LISPRO 4 UNITS: 100 INJECTION, SOLUTION INTRAVENOUS; SUBCUTANEOUS at 12:09

## 2019-03-20 RX ADMIN — LOSARTAN POTASSIUM 100 MG: 50 TABLET ORAL at 09:03

## 2019-03-20 NOTE — DISCHARGE SUMMARY
Hospitalist Discharge Summary     Patient ID:  Sheyla Lemos  [id number]  48 y.o.  1950  Admit date: 3/14/2019 10:43 AM  Discharge date and time: 3/20/2019  Attending: David Jasso DO  PCP:  Parminder Soriano MD  Treatment Team: Attending Provider: David Jasso DO; Utilization Review: Abdirashid Rm RN; Care Manager: Tamiko Quevedo; Care Manager: Tawnya Mcbride RN; Occupational Therapist: Ivan Dawn OTR/L; Physical Therapist: Ellis Fofana PT    Principal Diagnosis Generalized weakness   Principal Problem:    Generalized weakness (2/3/2019)    Active Problems:    Hypertension (1/12/2012)      Hyperlipidemia (5/2/2014)      History of CVA (cerebrovascular accident) (9/14/2016)      S/P placement of cardiac pacemaker (2/3/2019)      Atrial fibrillation ([address] (3/14/2019)       Mr. Umair Bird is a 77 y/o male with a h/o BPH, CAD, HTN, DM, lumbar radiculopathy, TIA, AFib on Eliquis who presents with progressive weakness since this past Sunday. This has been a recurrent complaint. 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 He developed AFib and was started on BB, amiodarone and Eliquis. He was weak then but was discharged home only to return with c/o weakness. CT head and the rest of his w/u at that time was negative. He was discharged to rehab and was there about 3 weeks. He has been home almost two weeks but since this past Sunday has become weak again. Does have h/o CVA but no reported slurred speech or asymmetric weakness. Repeat head CT today shows chronic small vessel disease and unchanged right BG lacunar infarcts. He denies any N/V/D but has had poor PO intake. No major stressors or life changes noted but does appear weak and depressed. No fevers, chills or sick contacts that he's aware of. Unfortunately his wife is unable to provide for him like this at home. Interval History (3/20): patient examined at bedside.  No acute overnight events. No fevers/chills, chest pain, shortness of breath, nausea/vomiting, or diarrhea. Weakness feeling improved overall. Hospital Course:  Please refer to the admission H&P for details of presentation. In summary, the patient is admitted due to recurrent generalized weakness, patient's wife unable to care for him at home. Due to this, stroke-workup completed that was negative (see below). Repeat CT imagining of the entire spine completed showing degenerative changes. Patient switched to scheduled Tylenol regimen with improvement overall. Hematology workup unremarkable. Levothyroxine increased from 25 mcg to 50 mcg. Patient hemodynamically stable for hospital discharge to rehab today. Details of patient's hospitalization discussed with patient and family who understand and agree with hospital discharge. Return precautions provided. Will need outpatient follow-up with his PCP within 1 week. All questions answered. Significant Diagnostic Studies:     CT head without contrast     History: generalized weakness, inability to ambulate; Hx of CVA.     Technique: 5mm axial images were obtained from the skull base to the vertex  without contrast. Radiation dose reduction techniques were used for this study:   Our CT scanners use one or all of the following: Automated exposure control,  adjustment of the mA and/or kVp according to patient's size, iterative  reconstruction.     Comparison: 02/03/2019     Findings: The ventricles and sulci are appropriate for age. There are no  extra-axial fluid collections. No evidence of acute intraparenchymal hemorrhage  or mass effect is identified. Patchy areas of decreased attenuation are noted  within the supratentorial white matter. These are nonspecific findings but would  be most compatible with mild chronic small vessel ischemic changes. There is no  evidence to suggest an acute major territorial infarct.  There are small remote  right basal ganglia lacunar infarctions, unchanged.     The bony calvarium is intact. The visualized mastoid air cells and paranasal  sinuses are well pneumatized and aerated.     IMPRESSION  Impression:     1. Findings most compatible with mild chronic small vessel ischemic changes and  remote right basal ganglia lacunar infarctions are unchanged. 2. Otherwise unremarkable unenhanced CT scan of the brain. History: Progressive weakness.     EXAM: CT cervical, thoracic, and lumbar spine without contrast     TECHNIQUE: Thin section axial CT images are obtained through the cervical,  thoracic, and lumbar spine. Coronal and sagittal reformatted images are obtained  based on the axial data. Radiation dose reduction techniques were used for this  study. Our CT scanners use one or all of the following: Automated exposure  control, adjustment of the mA and/or kV according to patient size, use of  iterative reconstruction.     No comparison     FINDINGS:     Cervical spine: The patient has had prior bilateral laminectomy C3-C5. There is  anterior osteophyte formation at multiple levels with loss of disc space. There  is multilevel facet arthropathy. The alignment is normal. Evaluation of the  upper lungs demonstrates centrilobular emphysematous change. There is a tiny  hypodense lesion within the right lobe of the thyroid. There is multilevel  uncovertebral hypertrophy.     Thoracic spine: There is S-shaped curvature of the thoracolumbar spine. There is  preservation of vertebral body height. There is a spinal cord stimulator  present. No definite spinal stenosis or neural foraminal narrowing present.     Lumbar spine: There is advanced facet arthropathy at all levels. There is vacuum  disc phenomenon at L5-S1 with some endplate irregularity. Vascular  calcifications are present within the distal aorta and its primary branches.   There is bilateral neural foraminal narrowing at L5-S1 with left-sided neural  foraminal narrowing at L3-4 and L4-5.     IMPRESSION  IMPRESSION:  1. Advanced multilevel cervical and lumbar spondylosis. 2. S-shaped curvature of the thoracolumbar spine. 3. Bilateral neural foraminal narrowing at L5-S1 with left-sided neural  foraminal compromise L3-4 and L4-5. 4. Presence of a spinal cord stimulator. 5. Bilateral laminectomies C3-C5. MRI BRAIN WITHOUT CONTRAST.     HISTORY: Progressive lower extremity weakness.     COMPARISON:  None. Study performed within 24 hours of admission.     TECHNIQUE:  Sagittal T1, axial T1, T2, FLAIR, gradient echo, diffusion with ADC  map and coronal FLAIR.       FINDINGS:  Diffusion images do not demonstrate any areas of restricted diffusion  to suggest acute infarction. Mild to moderate nonspecific periventricular and  centrum semiovale FLAIR and T2 white matter hyperintensities are present; these  do not enhance with contrast. No midline shift, mass or mass effect. Gradient  echo images are demonstrates 2 tiny foci of susceptibility artifact. .  No  evidence of acute hemorrhage. The lateral ventricles are normal size. The  pituitary, parasellar and midline structures are unremarkable on the sagittal T1  images. There are normal T2 flow-voids in the major vessels. Posterior fossa  structures are unremarkable. The basal ganglia appear symmetric. Orbits are  grossly normal.  Paranasal sinuses are clear.     IMPRESSION  IMPRESSION: No acute infarction. Mild chronic small vessel disease changes. Labs: Results:       Chemistry Recent Labs     03/20/19  0420   *      K 3.0*   *   CO2 28   BUN 13   CREA 0.82   CA 8.4   AGAP 8      CBC w/Diff Recent Labs     03/20/19  0420   WBC 4.8   RBC 3.83*   HGB 11.5*   HCT 35.8*      GRANS 47   LYMPH 38   EOS 5      Cardiac Enzymes No results for input(s): CPK, CKND1, ELIEZER in the last 72 hours. No lab exists for component: CKRMB, TROIP   Coagulation No results for input(s): PTP, INR, APTT in the last 72 hours.     No lab exists for component: INREXT    Lipid Panel Lab Results   Component Value Date/Time    Cholesterol, total 110 07/27/2018 01:49 PM    HDL Cholesterol 36 (L) 07/27/2018 01:49 PM    LDL, calculated 46 07/27/2018 01:49 PM    VLDL, calculated 28 07/27/2018 01:49 PM    Triglyceride 142 07/27/2018 01:49 PM    CHOL/HDL Ratio 2.2 11/28/2017 04:28 AM      BNP No results for input(s): BNPP in the last 72 hours. Liver Enzymes No results for input(s): TP, ALB, TBIL, AP, SGOT, GPT in the last 72 hours. No lab exists for component: DBIL   Thyroid Studies Lab Results   Component Value Date/Time    TSH 5.810 (H) 03/14/2019 11:00 AM            Discharge Exam:  Visit Vitals  /79 (BP 1 Location: Right arm, BP Patient Position: Supine)   Pulse 64   Temp 98.3 °F (36.8 °C)   Resp 17   Ht 6' 1\" (1.854 m)   Wt 102.4 kg (225 lb 12 oz)   SpO2 99%   BMI 29.78 kg/m²     GENERAL: alert, cooperative, no distress, appears stated age  EYE: negative  LYMPHATIC: Cervical, supraclavicular, and axillary nodes normal.   THROAT & NECK: normal and no erythema or exudates noted. LUNG: clear to auscultation bilaterally  HEART: regular rate and rhythm, S1, S2 normal, no murmur, click, rub or gallop  ABDOMEN: soft, non-tender. Bowel sounds normal. No masses,  no organomegaly  EXTREMITIES:  extremities normal, atraumatic, no cyanosis or edema  SKIN: Normal.  NEUROLOGIC: oriented x 3, he has weakness over his upper and lower extremities, he is able to lift UE against gravity, but he is unable to do it with his Lower extremities. He is able to move them side ways. Sensitivity is preserved. PSYCHIATRIC: non focal    Disposition: SNF  Discharge Condition: stable  Patient Instructions:   Current Discharge Medication List      START taking these medications    Details   gabapentin (NEURONTIN) 100 mg capsule Take 1 Cap by mouth three (3) times daily for 30 days.   Qty: 90 Cap, Refills: 0         CONTINUE these medications which have CHANGED    Details amLODIPine (NORVASC) 10 mg tablet Take 1 Tab by mouth daily for 30 days. Qty: 30 Tab, Refills: 0      hydrALAZINE (APRESOLINE) 50 mg tablet Take 1 Tab by mouth three (3) times daily for 30 days. Qty: 90 Tab, Refills: 0      levothyroxine (SYNTHROID) 50 mcg tablet Take 1 Tab by mouth Daily (before breakfast) for 30 days. Qty: 30 Tab, Refills: 0         CONTINUE these medications which have NOT CHANGED    Details   tamsulosin (FLOMAX) 0.4 mg capsule Take 1 Cap by mouth daily. Qty: 90 Cap, Refills: 3    Associated Diagnoses: Urinary urgency      lancets (ACCU-CHEK FASTCLIX LANCING DEV) misc Accu Check Fastclix LancetsPt is to check BS bid. Dx.E11.9  Qty: 200 Each, Refills: 3      glucose blood VI test strips (ACCU-CHEK GUIDE) strip Accu check Guide Strips  Pt is to check BS Bid. Dx:E11.9  Qty: 200 Strip, Refills: 3      prazosin (MINIPRESS) 1 mg capsule Take 1 Cap by mouth nightly. Qty: 90 Cap, Refills: 3      potassium chloride (K-DUR, KLOR-CON) 20 mEq tablet Take 1 Tab by mouth daily. Qty: 90 Tab, Refills: 3      furosemide (LASIX) 20 mg tablet Take 1 Tab by mouth daily. Take  by mouth daily. Qty: 90 Tab, Refills: 3    Associated Diagnoses: Benign essential HTN      clopidogrel (PLAVIX) 75 mg tab Take 1 Tab by mouth daily (after breakfast). Qty: 90 Tab, Refills: 3      amiodarone (PACERONE) 100 mg tablet Take 1 Tab by mouth daily. Qty: 90 Tab, Refills: 3      apixaban (ELIQUIS) 5 mg tablet Take 1 Tab by mouth every twelve (12) hours. Qty: 60 Tab, Refills: 6      metoprolol succinate (TOPROL-XL) 50 mg XL tablet Take 1 Tab by mouth daily. Qty: 30 Tab, Refills: 6      losartan (COZAAR) 100 mg tablet Take 1 Tab by mouth daily.   Qty: 30 Tab, Refills: 6      Blood Glucose Control High&Low (ACCU-CHEK GUIDE L1-L2 CTRL SOL) soln Accu Check Guide Control Solution L1-L2 Use as directed  DX E11.9  Qty: 1 Bottle, Refills: 5      insulin NPH (NOVOLIN N, HUMULIN N) 100 unit/mL injection 15 Units by SubCUTAneous route two (2) times a day. 15 units in the morning and 15 units at bedtime      pantoprazole (PROTONIX) 40 mg tablet Take 40 mg by mouth daily. escitalopram oxalate (LEXAPRO) 10 mg tablet Take 15 mg by mouth daily. risperiDONE (RISPERDAL) 2 mg tablet Take 1 mg by mouth daily. isosorbide mononitrate ER (IMDUR) 30 mg tablet Take 30 mg by mouth daily. cyanocobalamin (VITAMIN B-12) 1,000 mcg tablet Take 1,000 mcg by mouth daily. galantamine (RAZADYNE) 8 mg tablet Take 8 mg by mouth two (2) times a day. nitroglycerin (NITROSTAT) 0.4 mg SL tablet by SubLINGual route every five (5) minutes as needed. STOP taking these medications       gabapentin (NEURONTIN) 600 mg tablet Comments:   Reason for Stopping:               Activity: Activity as tolerated  Diet: Cardiac Diet  Wound Care: None needed    Follow-up  ·   With PCP within 1 week  Time spent to discharge patient 35 minutes  Signed:   Chris Alexandra DO  3/20/2019  10:53 AM

## 2019-03-20 NOTE — PROGRESS NOTES
The documentation for this period is being entered following the guidelines as defined in the [facility] policy by Lucretia Pederson.

## 2019-03-20 NOTE — PROGRESS NOTES
TRANSFER - OUT REPORT: 
 
Verbal report given to  Yue Herrera RN on Claudene Gemma  being transferred to  [facility] 8  for routine progression of care- Rehab. Report consisted of patients Situation, Background, Assessment and  
Recommendations(SBAR). Information from the following report(s) SBAR and Kardex was reviewed with the receiving nurse. Lines:  
Peripheral IV 03/14/19 Left Antecubital (Active) Site Assessment Clean, dry, & intact 3/20/2019  8:09 AM  
Phlebitis Assessment 0 3/20/2019  8:09 AM  
Infiltration Assessment 0 3/20/2019  8:09 AM  
Dressing Status Clean, dry, & intact 3/20/2019  8:09 AM  
Dressing Type Tape;Transparent 3/20/2019  8:09 AM  
Hub Color/Line Status Green;Flushed;Patent 3/20/2019  8:09 AM  
Alcohol Cap Used No 3/20/2019 12:00 AM  
  
 
Opportunity for questions and clarification was provided. Patient transported with: 
  [facility] service

## 2019-03-20 NOTE — PROGRESS NOTES
Problem: Falls - Risk of 
Goal: *Absence of Falls Document [place] Rank Fall Risk and appropriate interventions in the flowsheet. Outcome: Progressing Towards Goal 
Fall Risk Interventions: 
Mobility Interventions: Communicate number of staff needed for ambulation/transfer, Bed/chair exit alarm, Patient to call before getting OOB Mentation Interventions: Bed/chair exit alarm, Door open when patient unattended, Reorient patient Medication Interventions: Bed/chair exit alarm, Patient to call before getting OOB, Teach patient to arise slowly Elimination Interventions: Call light in reach, Bed/chair exit alarm, Patient to call for help with toileting needs, Toileting schedule/hourly rounds, Urinal in reach History of Falls Interventions: Bed/chair exit alarm, Door open when patient unattended, Investigate reason for fall Problem: Pressure Injury - Risk of 
Goal: *Prevention of pressure injury Document [place] Scale and appropriate interventions in the flowsheet. Outcome: Progressing Towards Goal 
Pressure Injury Interventions: 
Sensory Interventions: Assess changes in LOC, Check visual cues for pain, Discuss PT/OT consult with provider Moisture Interventions: Absorbent underpads, Apply protective barrier, creams and emollients, Check for incontinence Q2 hours and as needed Activity Interventions: Assess need for specialty bed, Pressure redistribution bed/mattress(bed type), PT/OT evaluation Mobility Interventions: Assess need for specialty bed, Pressure redistribution bed/mattress (bed type), PT/OT evaluation Nutrition Interventions: Document food/fluid/supplement intake, Offer support with meals,snacks and hydration Friction and Shear Interventions: Apply protective barrier, creams and emollients, HOB 30 degrees or less, Foam dressings/transparent film/skin sealants

## 2019-03-20 NOTE — PROGRESS NOTES
Patient transport by ambulance, accompany by [facility]'s ambulance service. No distress noted.

## 2019-03-20 NOTE — PROGRESS NOTES
JESSICA received call from [facility]. They can accept pt today. JESSICA provided them with [facility] and RUG rate. JESSICA called spouse to inform her.  Pt will be discharged and taken by [facility] ambulance at 1pm.

## 2019-03-20 NOTE — PROGRESS NOTES
SW met with pt's spouse regarding SNF. Her first choice is [address] However, they do not have any male beds. [facility] second [facility]. SW has called 3 times and havenot received a call back. [facility] third [facility]. JESSICA will conitnue to call and find a SNF that can accept patient.

## 2019-03-20 NOTE — PROGRESS NOTES
Pt is being discharge no needs at time of discharge. Milestones Met Care Management Interventions PCP Verified by CM: Yes Mode of Transport at Discharge: BLS Transition of Care Consult (CM Consult): Discharge Planning, SNF Discharge Durable Medical Equipment: No 
Physical Therapy Consult: Yes Occupational Therapy Consult: Yes Speech Therapy Consult: No 
Current Support Network: Lives with Spouse, Own Home Confirm Follow Up Transport: Family Plan discussed with Pt/Family/Caregiver: Yes Freedom of Choice Offered: Yes Discharge Location Discharge Placement: Unable to determine at this time

## 2019-03-20 NOTE — PROGRESS NOTES
Care Management Interventions PCP Verified by CM: Yes Mode of Transport at Discharge: BLS Transition of Care Consult (CM Consult): Discharge Planning, SNF Discharge Durable Medical Equipment: No 
Physical Therapy Consult: Yes Occupational Therapy Consult: Yes Speech Therapy Consult: No 
Current Support Network: Lives with Spouse, Own Home Confirm Follow Up Transport: Family Plan discussed with Pt/Family/Caregiver: Yes Freedom of Choice Offered: Yes Discharge Location Discharge Placement: Unable to determine at this time SNF [facility] Pt has orders to discharge to SNF.  He will be transported by Lis Aceves at [address] [facility].

## 2019-03-20 NOTE — PROGRESS NOTES
END OF SHIFT NOTE: 
 
Intake/Output No intake/output data recorded. Voiding: YES Catheter: NO 
Drain:   
 
 
 
 
Stool:  0 occurrences. Stool Assessment Stool Color: Yellow (03/18/19 0329) Stool Appearance: Loose(chunky) (03/18/19 0329) Stool Amount: Large (03/18/19 0329) Stool Source/Status: Rectum (03/18/19 0329) Emesis:  0 occurrences. VITAL SIGNS Patient Vitals for the past 12 hrs: 
 Temp Pulse Resp BP SpO2  
03/20/19 0458 98.6 °F (37 °C) 61 18 145/72   
03/19/19 2324 98.6 °F (37 °C) 61 18 134/70   
03/19/19 2019 98.5 °F (36.9 °C) 61 18 132/64 96 % Pain Assessment Pain 1 Pain Scale 1: Numeric (0 - 10) (03/20/19 0000) Pain Intensity 1: 0 (03/20/19 0000) Patient Stated Pain Goal: 0 (03/20/19 0000) Pain Reassessment 1: Yes (03/19/19 2019) Pain Onset 1: past few days (03/19/19 1915) Pain Location 1: Scrotum (03/19/19 1915) Pain Orientation 1: Anterior;Posterior (03/19/19 1915) Pain Description 1: Sore(raw) (03/19/19 1915) Pain Intervention(s) 1: Medication (see MAR) (03/19/19 1915) Ambulating No 
 
Additional Information: Patient rested well. Given tramadol once for pain. No other needs noted. Shift report given to oncoming nurse at the bedside. Charles Weaver

## 2019-03-21 PROCEDURE — 3331090002 HH PPS REVENUE DEBIT

## 2019-03-21 PROCEDURE — 3331090001 HH PPS REVENUE CREDIT

## 2019-03-22 PROCEDURE — 3331090001 HH PPS REVENUE CREDIT

## 2019-03-22 PROCEDURE — 3331090002 HH PPS REVENUE DEBIT

## 2019-03-23 PROCEDURE — 3331090001 HH PPS REVENUE CREDIT

## 2019-03-23 PROCEDURE — 3331090002 HH PPS REVENUE DEBIT

## 2019-03-24 PROCEDURE — 3331090001 HH PPS REVENUE CREDIT

## 2019-03-24 PROCEDURE — 3331090002 HH PPS REVENUE DEBIT

## 2019-03-25 PROCEDURE — 3331090002 HH PPS REVENUE DEBIT

## 2019-03-25 PROCEDURE — 3331090001 HH PPS REVENUE CREDIT

## 2019-03-26 PROCEDURE — 3331090001 HH PPS REVENUE CREDIT

## 2019-03-26 PROCEDURE — 3331090002 HH PPS REVENUE DEBIT

## 2019-03-27 PROCEDURE — 3331090001 HH PPS REVENUE CREDIT

## 2019-03-27 PROCEDURE — 3331090002 HH PPS REVENUE DEBIT

## 2019-03-28 PROCEDURE — 3331090001 HH PPS REVENUE CREDIT

## 2019-03-28 PROCEDURE — 3331090002 HH PPS REVENUE DEBIT

## 2019-03-29 PROCEDURE — 3331090002 HH PPS REVENUE DEBIT

## 2019-03-29 PROCEDURE — 3331090001 HH PPS REVENUE CREDIT

## 2019-03-30 PROCEDURE — 3331090002 HH PPS REVENUE DEBIT

## 2019-03-30 PROCEDURE — 3331090001 HH PPS REVENUE CREDIT

## 2019-03-31 PROCEDURE — 3331090001 HH PPS REVENUE CREDIT

## 2019-03-31 PROCEDURE — 3331090002 HH PPS REVENUE DEBIT

## 2019-04-01 PROCEDURE — 3331090002 HH PPS REVENUE DEBIT

## 2019-04-01 PROCEDURE — 3331090001 HH PPS REVENUE CREDIT

## 2019-04-02 ENCOUNTER — HOME CARE VISIT (OUTPATIENT)
Dept: HOME HEALTH SERVICES | Facility: HOME HEALTH | Age: 69
End: 2019-04-02
Payer: MEDICARE

## 2019-04-02 PROCEDURE — 3331090002 HH PPS REVENUE DEBIT

## 2019-04-02 PROCEDURE — 3331090001 HH PPS REVENUE CREDIT

## 2019-04-03 PROCEDURE — 3331090001 HH PPS REVENUE CREDIT

## 2019-04-03 PROCEDURE — 3331090002 HH PPS REVENUE DEBIT

## 2019-04-04 ENCOUNTER — APPOINTMENT (OUTPATIENT)
Dept: GENERAL RADIOLOGY | Age: 69
End: 2019-04-04
Attending: EMERGENCY MEDICINE
Payer: MEDICARE

## 2019-04-04 ENCOUNTER — HOSPITAL ENCOUNTER (OUTPATIENT)
Age: 69
Setting detail: OBSERVATION
Discharge: NURSING, REHAB OR LONG-TERM CARE | End: 2019-04-10
Attending: EMERGENCY MEDICINE | Admitting: FAMILY MEDICINE
Payer: MEDICARE

## 2019-04-04 DIAGNOSIS — R53.1 GENERALIZED WEAKNESS: ICD-10-CM

## 2019-04-04 DIAGNOSIS — R55 NEAR SYNCOPE: Primary | ICD-10-CM

## 2019-04-04 DIAGNOSIS — G89.4 CHRONIC PAIN SYNDROME: Chronic | ICD-10-CM

## 2019-04-04 DIAGNOSIS — T07.XXXA MULTIPLE CONTUSIONS: ICD-10-CM

## 2019-04-04 PROBLEM — I73.9 PAD (PERIPHERAL ARTERY DISEASE) (HCC): Status: RESOLVED | Noted: 2017-11-29 | Resolved: 2019-04-04

## 2019-04-04 PROBLEM — I70.25 ATHEROSCLEROSIS OF NATIVE ARTERY OF EXTREMITY WITH ULCERATION (HCC): Status: RESOLVED | Noted: 2017-11-29 | Resolved: 2019-04-04

## 2019-04-04 PROBLEM — Z95.0 S/P PLACEMENT OF CARDIAC PACEMAKER: Status: RESOLVED | Noted: 2019-02-03 | Resolved: 2019-04-04

## 2019-04-04 PROBLEM — F32.1 MODERATE MAJOR DEPRESSION (HCC): Status: RESOLVED | Noted: 2018-08-10 | Resolved: 2019-04-04

## 2019-04-04 PROBLEM — N40.0 BENIGN NODULAR PROSTATIC HYPERPLASIA WITHOUT LOWER URINARY TRACT SYMPTOMS: Chronic | Status: RESOLVED | Noted: 2017-05-25 | Resolved: 2019-04-04

## 2019-04-04 PROBLEM — E11.21 TYPE 2 DIABETES WITH NEPHROPATHY (HCC): Chronic | Status: ACTIVE | Noted: 2018-11-06

## 2019-04-04 PROBLEM — I48.91 ATRIAL FIBRILLATION (HCC): Chronic | Status: ACTIVE | Noted: 2019-03-14

## 2019-04-04 LAB
ALBUMIN SERPL-MCNC: 3 G/DL (ref 3.2–4.6)
ALBUMIN/GLOB SERPL: 0.8 {RATIO} (ref 1.2–3.5)
ALP SERPL-CCNC: 67 U/L (ref 50–136)
ALT SERPL-CCNC: 17 U/L (ref 12–65)
ANION GAP SERPL CALC-SCNC: 4 MMOL/L (ref 7–16)
AST SERPL-CCNC: 22 U/L (ref 15–37)
BASOPHILS # BLD: 0 K/UL (ref 0–0.2)
BASOPHILS NFR BLD: 0 % (ref 0–2)
BILIRUB SERPL-MCNC: 0.4 MG/DL (ref 0.2–1.1)
BUN SERPL-MCNC: 14 MG/DL (ref 8–23)
CALCIUM SERPL-MCNC: 8.7 MG/DL (ref 8.3–10.4)
CHLORIDE SERPL-SCNC: 109 MMOL/L (ref 98–107)
CO2 SERPL-SCNC: 32 MMOL/L (ref 21–32)
CREAT SERPL-MCNC: 1.2 MG/DL (ref 0.8–1.5)
DIFFERENTIAL METHOD BLD: ABNORMAL
EOSINOPHIL # BLD: 0.2 K/UL (ref 0–0.8)
EOSINOPHIL NFR BLD: 3 % (ref 0.5–7.8)
ERYTHROCYTE [DISTWIDTH] IN BLOOD BY AUTOMATED COUNT: 15.1 % (ref 11.9–14.6)
GLOBULIN SER CALC-MCNC: 3.9 G/DL (ref 2.3–3.5)
GLUCOSE SERPL-MCNC: 86 MG/DL (ref 65–100)
HCT VFR BLD AUTO: 39 % (ref 41.1–50.3)
HGB BLD-MCNC: 12.4 G/DL (ref 13.6–17.2)
IMM GRANULOCYTES # BLD AUTO: 0 K/UL (ref 0–0.5)
IMM GRANULOCYTES NFR BLD AUTO: 0 % (ref 0–5)
LYMPHOCYTES # BLD: 2.3 K/UL (ref 0.5–4.6)
LYMPHOCYTES NFR BLD: 37 % (ref 13–44)
MAGNESIUM SERPL-MCNC: 2 MG/DL (ref 1.8–2.4)
MCH RBC QN AUTO: 30.2 PG (ref 26.1–32.9)
MCHC RBC AUTO-ENTMCNC: 31.8 G/DL (ref 31.4–35)
MCV RBC AUTO: 94.9 FL (ref 79.6–97.8)
MONOCYTES # BLD: 0.6 K/UL (ref 0.1–1.3)
MONOCYTES NFR BLD: 9 % (ref 4–12)
NEUTS SEG # BLD: 3.2 K/UL (ref 1.7–8.2)
NEUTS SEG NFR BLD: 51 % (ref 43–78)
NRBC # BLD: 0 K/UL (ref 0–0.2)
PHOSPHATE SERPL-MCNC: 3.4 MG/DL (ref 2.3–3.7)
PLATELET # BLD AUTO: 187 K/UL (ref 150–450)
PMV BLD AUTO: 11.2 FL (ref 9.4–12.3)
POTASSIUM SERPL-SCNC: 4 MMOL/L (ref 3.5–5.1)
PROT SERPL-MCNC: 6.9 G/DL (ref 6.3–8.2)
RBC # BLD AUTO: 4.11 M/UL (ref 4.23–5.6)
SODIUM SERPL-SCNC: 145 MMOL/L (ref 136–145)
TROPONIN I SERPL-MCNC: 0.02 NG/ML (ref 0.02–0.05)
TSH SERPL DL<=0.005 MIU/L-ACNC: 6.78 UIU/ML (ref 0.36–3.74)
WBC # BLD AUTO: 6.4 K/UL (ref 4.3–11.1)

## 2019-04-04 PROCEDURE — 96375 TX/PRO/DX INJ NEW DRUG ADDON: CPT

## 2019-04-04 PROCEDURE — 73562 X-RAY EXAM OF KNEE 3: CPT

## 2019-04-04 PROCEDURE — 99285 EMERGENCY DEPT VISIT HI MDM: CPT | Performed by: EMERGENCY MEDICINE

## 2019-04-04 PROCEDURE — 81003 URINALYSIS AUTO W/O SCOPE: CPT | Performed by: EMERGENCY MEDICINE

## 2019-04-04 PROCEDURE — 3331090002 HH PPS REVENUE DEBIT

## 2019-04-04 PROCEDURE — 74011250636 HC RX REV CODE- 250/636: Performed by: FAMILY MEDICINE

## 2019-04-04 PROCEDURE — 74011250636 HC RX REV CODE- 250/636: Performed by: INTERNAL MEDICINE

## 2019-04-04 PROCEDURE — 84443 ASSAY THYROID STIM HORMONE: CPT

## 2019-04-04 PROCEDURE — 3331090001 HH PPS REVENUE CREDIT

## 2019-04-04 PROCEDURE — 65390000012 HC CONDITION CODE 44 OBSERVATION

## 2019-04-04 PROCEDURE — 80053 COMPREHEN METABOLIC PANEL: CPT

## 2019-04-04 PROCEDURE — 99218 HC RM OBSERVATION: CPT

## 2019-04-04 PROCEDURE — 74011000302 HC RX REV CODE- 302: Performed by: FAMILY MEDICINE

## 2019-04-04 PROCEDURE — 71045 X-RAY EXAM CHEST 1 VIEW: CPT

## 2019-04-04 PROCEDURE — 83735 ASSAY OF MAGNESIUM: CPT

## 2019-04-04 PROCEDURE — 84100 ASSAY OF PHOSPHORUS: CPT

## 2019-04-04 PROCEDURE — 86580 TB INTRADERMAL TEST: CPT | Performed by: FAMILY MEDICINE

## 2019-04-04 PROCEDURE — 93005 ELECTROCARDIOGRAM TRACING: CPT | Performed by: EMERGENCY MEDICINE

## 2019-04-04 PROCEDURE — 74011250637 HC RX REV CODE- 250/637: Performed by: FAMILY MEDICINE

## 2019-04-04 PROCEDURE — 85025 COMPLETE CBC W/AUTO DIFF WBC: CPT

## 2019-04-04 PROCEDURE — 94762 N-INVAS EAR/PLS OXIMTRY CONT: CPT | Performed by: EMERGENCY MEDICINE

## 2019-04-04 PROCEDURE — 84484 ASSAY OF TROPONIN QUANT: CPT

## 2019-04-04 PROCEDURE — 73502 X-RAY EXAM HIP UNI 2-3 VIEWS: CPT

## 2019-04-04 RX ORDER — PRAZOSIN HYDROCHLORIDE 1 MG/1
1 CAPSULE ORAL
Status: DISCONTINUED | OUTPATIENT
Start: 2019-04-04 | End: 2019-04-10 | Stop reason: HOSPADM

## 2019-04-04 RX ORDER — GABAPENTIN 100 MG/1
100 CAPSULE ORAL 3 TIMES DAILY
Status: DISCONTINUED | OUTPATIENT
Start: 2019-04-04 | End: 2019-04-10 | Stop reason: HOSPADM

## 2019-04-04 RX ORDER — ISOSORBIDE MONONITRATE 30 MG/1
30 TABLET, EXTENDED RELEASE ORAL DAILY
Status: DISCONTINUED | OUTPATIENT
Start: 2019-04-05 | End: 2019-04-10 | Stop reason: HOSPADM

## 2019-04-04 RX ORDER — HYDRALAZINE HYDROCHLORIDE 50 MG/1
50 TABLET, FILM COATED ORAL 3 TIMES DAILY
Status: DISCONTINUED | OUTPATIENT
Start: 2019-04-04 | End: 2019-04-10 | Stop reason: HOSPADM

## 2019-04-04 RX ORDER — LEVOTHYROXINE SODIUM 50 UG/1
50 TABLET ORAL
Status: DISCONTINUED | OUTPATIENT
Start: 2019-04-05 | End: 2019-04-10 | Stop reason: HOSPADM

## 2019-04-04 RX ORDER — SODIUM CHLORIDE 0.9 % (FLUSH) 0.9 %
5-40 SYRINGE (ML) INJECTION AS NEEDED
Status: DISCONTINUED | OUTPATIENT
Start: 2019-04-04 | End: 2019-04-10 | Stop reason: HOSPADM

## 2019-04-04 RX ORDER — NALOXONE HYDROCHLORIDE 0.4 MG/ML
0.4 INJECTION, SOLUTION INTRAMUSCULAR; INTRAVENOUS; SUBCUTANEOUS AS NEEDED
Status: DISCONTINUED | OUTPATIENT
Start: 2019-04-04 | End: 2019-04-10 | Stop reason: HOSPADM

## 2019-04-04 RX ORDER — AMIODARONE HYDROCHLORIDE 200 MG/1
100 TABLET ORAL DAILY
Status: DISCONTINUED | OUTPATIENT
Start: 2019-04-05 | End: 2019-04-10 | Stop reason: HOSPADM

## 2019-04-04 RX ORDER — LORAZEPAM 1 MG/1
1 TABLET ORAL
Status: DISCONTINUED | OUTPATIENT
Start: 2019-04-04 | End: 2019-04-10 | Stop reason: HOSPADM

## 2019-04-04 RX ORDER — TAMSULOSIN HYDROCHLORIDE 0.4 MG/1
0.4 CAPSULE ORAL DAILY
Status: DISCONTINUED | OUTPATIENT
Start: 2019-04-05 | End: 2019-04-10 | Stop reason: HOSPADM

## 2019-04-04 RX ORDER — LOSARTAN POTASSIUM 50 MG/1
100 TABLET ORAL DAILY
Status: DISCONTINUED | OUTPATIENT
Start: 2019-04-05 | End: 2019-04-10 | Stop reason: HOSPADM

## 2019-04-04 RX ORDER — METOPROLOL SUCCINATE 50 MG/1
50 TABLET, EXTENDED RELEASE ORAL DAILY
Status: DISCONTINUED | OUTPATIENT
Start: 2019-04-05 | End: 2019-04-10 | Stop reason: HOSPADM

## 2019-04-04 RX ORDER — PANTOPRAZOLE SODIUM 40 MG/1
40 TABLET, DELAYED RELEASE ORAL DAILY
Status: DISCONTINUED | OUTPATIENT
Start: 2019-04-05 | End: 2019-04-10 | Stop reason: HOSPADM

## 2019-04-04 RX ORDER — GALANTAMINE 4 MG/1
4 TABLET, FILM COATED ORAL 2 TIMES DAILY
Status: DISCONTINUED | OUTPATIENT
Start: 2019-04-05 | End: 2019-04-05

## 2019-04-04 RX ORDER — AMLODIPINE BESYLATE 10 MG/1
10 TABLET ORAL DAILY
Status: DISCONTINUED | OUTPATIENT
Start: 2019-04-05 | End: 2019-04-10 | Stop reason: HOSPADM

## 2019-04-04 RX ORDER — ONDANSETRON 2 MG/ML
4 INJECTION INTRAMUSCULAR; INTRAVENOUS
Status: DISCONTINUED | OUTPATIENT
Start: 2019-04-04 | End: 2019-04-10 | Stop reason: HOSPADM

## 2019-04-04 RX ORDER — BISACODYL 5 MG
5 TABLET, DELAYED RELEASE (ENTERIC COATED) ORAL DAILY PRN
Status: DISCONTINUED | OUTPATIENT
Start: 2019-04-04 | End: 2019-04-10 | Stop reason: HOSPADM

## 2019-04-04 RX ORDER — OXYCODONE AND ACETAMINOPHEN 10; 325 MG/1; MG/1
1 TABLET ORAL
Status: DISCONTINUED | OUTPATIENT
Start: 2019-04-04 | End: 2019-04-10 | Stop reason: HOSPADM

## 2019-04-04 RX ORDER — ESCITALOPRAM OXALATE 10 MG/1
20 TABLET ORAL DAILY
Status: DISCONTINUED | OUTPATIENT
Start: 2019-04-05 | End: 2019-04-10 | Stop reason: HOSPADM

## 2019-04-04 RX ORDER — RISPERIDONE 0.5 MG/1
1 TABLET, FILM COATED ORAL DAILY
Status: DISCONTINUED | OUTPATIENT
Start: 2019-04-05 | End: 2019-04-10 | Stop reason: HOSPADM

## 2019-04-04 RX ORDER — HYDRALAZINE HYDROCHLORIDE 20 MG/ML
10 INJECTION INTRAMUSCULAR; INTRAVENOUS ONCE
Status: COMPLETED | OUTPATIENT
Start: 2019-04-04 | End: 2019-04-04

## 2019-04-04 RX ORDER — SODIUM CHLORIDE 9 MG/ML
1000 INJECTION, SOLUTION INTRAVENOUS CONTINUOUS
Status: DISPENSED | OUTPATIENT
Start: 2019-04-04 | End: 2019-04-05

## 2019-04-04 RX ORDER — FUROSEMIDE 20 MG/1
20 TABLET ORAL DAILY
Status: DISCONTINUED | OUTPATIENT
Start: 2019-04-05 | End: 2019-04-10 | Stop reason: HOSPADM

## 2019-04-04 RX ORDER — DIPHENHYDRAMINE HCL 25 MG
25 CAPSULE ORAL
Status: DISCONTINUED | OUTPATIENT
Start: 2019-04-04 | End: 2019-04-10 | Stop reason: HOSPADM

## 2019-04-04 RX ORDER — POTASSIUM CHLORIDE 20 MEQ/1
20 TABLET, EXTENDED RELEASE ORAL DAILY
Status: DISCONTINUED | OUTPATIENT
Start: 2019-04-05 | End: 2019-04-10 | Stop reason: HOSPADM

## 2019-04-04 RX ORDER — ACETAMINOPHEN 325 MG/1
650 TABLET ORAL
Status: DISCONTINUED | OUTPATIENT
Start: 2019-04-04 | End: 2019-04-10 | Stop reason: HOSPADM

## 2019-04-04 RX ORDER — CLOPIDOGREL BISULFATE 75 MG/1
75 TABLET ORAL
Status: DISCONTINUED | OUTPATIENT
Start: 2019-04-05 | End: 2019-04-10 | Stop reason: HOSPADM

## 2019-04-04 RX ORDER — SODIUM CHLORIDE 0.9 % (FLUSH) 0.9 %
5-40 SYRINGE (ML) INJECTION EVERY 8 HOURS
Status: DISCONTINUED | OUTPATIENT
Start: 2019-04-04 | End: 2019-04-10 | Stop reason: HOSPADM

## 2019-04-04 RX ORDER — MORPHINE SULFATE 15 MG/1
15 TABLET ORAL
Status: DISCONTINUED | OUTPATIENT
Start: 2019-04-04 | End: 2019-04-10 | Stop reason: HOSPADM

## 2019-04-04 RX ADMIN — TUBERCULIN PURIFIED PROTEIN DERIVATIVE 5 UNITS: 5 INJECTION, SOLUTION INTRADERMAL at 23:17

## 2019-04-04 RX ADMIN — HYDRALAZINE HYDROCHLORIDE 50 MG: 50 TABLET, FILM COATED ORAL at 23:16

## 2019-04-04 RX ADMIN — GABAPENTIN 100 MG: 100 CAPSULE ORAL at 23:16

## 2019-04-04 RX ADMIN — HYDRALAZINE HYDROCHLORIDE 10 MG: 20 INJECTION INTRAMUSCULAR; INTRAVENOUS at 21:21

## 2019-04-04 RX ADMIN — APIXABAN 5 MG: 5 TABLET, FILM COATED ORAL at 23:16

## 2019-04-04 RX ADMIN — SODIUM CHLORIDE 1000 ML: 900 INJECTION, SOLUTION INTRAVENOUS at 23:16

## 2019-04-04 RX ADMIN — Medication 10 ML: at 23:17

## 2019-04-04 RX ADMIN — PRAZOSIN HYDROCHLORIDE 1 MG: 1 CAPSULE ORAL at 23:16

## 2019-04-04 NOTE — ED TRIAGE NOTES
Pt arrives via [facility]. EMS called to home for weakness. EMS reports pt arrived home yesterday from rehab following hospital admission. Wife states she is unable to care for pt at home due to his size and weakness. Pt complains of neck pain, low back pain, and left leg pain.
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
## 2019-04-05 LAB
ANION GAP SERPL CALC-SCNC: 6 MMOL/L (ref 7–16)
APPEARANCE UR: ABNORMAL
ATRIAL RATE: 68 BPM
BACTERIA URNS QL MICRO: ABNORMAL /HPF
BASOPHILS # BLD: 0 K/UL (ref 0–0.2)
BASOPHILS NFR BLD: 1 % (ref 0–2)
BILIRUB UR QL: NEGATIVE
BUN SERPL-MCNC: 11 MG/DL (ref 8–23)
CALCIUM SERPL-MCNC: 8.5 MG/DL (ref 8.3–10.4)
CALCULATED P AXIS, ECG09: -6 DEGREES
CALCULATED R AXIS, ECG10: -6 DEGREES
CALCULATED T AXIS, ECG11: 77 DEGREES
CASTS URNS QL MICRO: ABNORMAL /LPF
CHLORIDE SERPL-SCNC: 109 MMOL/L (ref 98–107)
CO2 SERPL-SCNC: 28 MMOL/L (ref 21–32)
COLOR UR: YELLOW
CREAT SERPL-MCNC: 0.9 MG/DL (ref 0.8–1.5)
DIAGNOSIS, 93000: NORMAL
DIFFERENTIAL METHOD BLD: ABNORMAL
EOSINOPHIL # BLD: 0.1 K/UL (ref 0–0.8)
EOSINOPHIL NFR BLD: 2 % (ref 0.5–7.8)
EPI CELLS #/AREA URNS HPF: ABNORMAL /HPF
ERYTHROCYTE [DISTWIDTH] IN BLOOD BY AUTOMATED COUNT: 14.9 % (ref 11.9–14.6)
GLUCOSE BLD STRIP.AUTO-MCNC: 129 MG/DL (ref 65–100)
GLUCOSE BLD STRIP.AUTO-MCNC: 146 MG/DL (ref 65–100)
GLUCOSE BLD STRIP.AUTO-MCNC: 147 MG/DL (ref 65–100)
GLUCOSE SERPL-MCNC: 125 MG/DL (ref 65–100)
GLUCOSE UR STRIP.AUTO-MCNC: NEGATIVE MG/DL
HCT VFR BLD AUTO: 38 % (ref 41.1–50.3)
HGB BLD-MCNC: 12.4 G/DL (ref 13.6–17.2)
HGB UR QL STRIP: NEGATIVE
IMM GRANULOCYTES # BLD AUTO: 0 K/UL (ref 0–0.5)
IMM GRANULOCYTES NFR BLD AUTO: 0 % (ref 0–5)
KETONES UR QL STRIP.AUTO: NEGATIVE MG/DL
LEUKOCYTE ESTERASE UR QL STRIP.AUTO: ABNORMAL
LYMPHOCYTES # BLD: 1.8 K/UL (ref 0.5–4.6)
LYMPHOCYTES NFR BLD: 34 % (ref 13–44)
MCH RBC QN AUTO: 30.3 PG (ref 26.1–32.9)
MCHC RBC AUTO-ENTMCNC: 32.6 G/DL (ref 31.4–35)
MCV RBC AUTO: 92.9 FL (ref 79.6–97.8)
MONOCYTES # BLD: 0.6 K/UL (ref 0.1–1.3)
MONOCYTES NFR BLD: 11 % (ref 4–12)
NEUTS SEG # BLD: 2.7 K/UL (ref 1.7–8.2)
NEUTS SEG NFR BLD: 53 % (ref 43–78)
NITRITE UR QL STRIP.AUTO: NEGATIVE
NRBC # BLD: 0 K/UL (ref 0–0.2)
P-R INTERVAL, ECG05: 114 MS
PH UR STRIP: 8.5 [PH] (ref 5–9)
PLATELET # BLD AUTO: 176 K/UL (ref 150–450)
PMV BLD AUTO: 11 FL (ref 9.4–12.3)
POTASSIUM SERPL-SCNC: 3.9 MMOL/L (ref 3.5–5.1)
PROT UR STRIP-MCNC: NEGATIVE MG/DL
Q-T INTERVAL, ECG07: 436 MS
QRS DURATION, ECG06: 92 MS
QTC CALCULATION (BEZET), ECG08: 477 MS
RBC # BLD AUTO: 4.09 M/UL (ref 4.23–5.6)
RBC #/AREA URNS HPF: ABNORMAL /HPF
SODIUM SERPL-SCNC: 143 MMOL/L (ref 136–145)
SP GR UR REFRACTOMETRY: 1.01 (ref 1–1.02)
UROBILINOGEN UR QL STRIP.AUTO: 0.2 EU/DL (ref 0.2–1)
VENTRICULAR RATE, ECG03: 72 BPM
WBC # BLD AUTO: 5.2 K/UL (ref 4.3–11.1)
WBC URNS QL MICRO: ABNORMAL /HPF

## 2019-04-05 PROCEDURE — 36415 COLL VENOUS BLD VENIPUNCTURE: CPT

## 2019-04-05 PROCEDURE — 85025 COMPLETE CBC W/AUTO DIFF WBC: CPT

## 2019-04-05 PROCEDURE — 3331090002 HH PPS REVENUE DEBIT

## 2019-04-05 PROCEDURE — 87086 URINE CULTURE/COLONY COUNT: CPT

## 2019-04-05 PROCEDURE — 81001 URINALYSIS AUTO W/SCOPE: CPT

## 2019-04-05 PROCEDURE — 74011250636 HC RX REV CODE- 250/636: Performed by: NURSE PRACTITIONER

## 2019-04-05 PROCEDURE — 97161 PT EVAL LOW COMPLEX 20 MIN: CPT

## 2019-04-05 PROCEDURE — 96361 HYDRATE IV INFUSION ADD-ON: CPT

## 2019-04-05 PROCEDURE — 87186 SC STD MICRODIL/AGAR DIL: CPT

## 2019-04-05 PROCEDURE — 74011250637 HC RX REV CODE- 250/637: Performed by: FAMILY MEDICINE

## 2019-04-05 PROCEDURE — 74011000258 HC RX REV CODE- 258: Performed by: NURSE PRACTITIONER

## 2019-04-05 PROCEDURE — 97110 THERAPEUTIC EXERCISES: CPT

## 2019-04-05 PROCEDURE — 82962 GLUCOSE BLOOD TEST: CPT

## 2019-04-05 PROCEDURE — 97530 THERAPEUTIC ACTIVITIES: CPT

## 2019-04-05 PROCEDURE — 99218 HC RM OBSERVATION: CPT

## 2019-04-05 PROCEDURE — 3331090001 HH PPS REVENUE CREDIT

## 2019-04-05 PROCEDURE — 87088 URINE BACTERIA CULTURE: CPT

## 2019-04-05 PROCEDURE — 77030020263 HC SOL INJ SOD CL0.9% LFCR 1000ML

## 2019-04-05 PROCEDURE — 77030011943

## 2019-04-05 PROCEDURE — 80048 BASIC METABOLIC PNL TOTAL CA: CPT

## 2019-04-05 PROCEDURE — 96365 THER/PROPH/DIAG IV INF INIT: CPT

## 2019-04-05 RX ORDER — SODIUM CHLORIDE 9 MG/ML
100 INJECTION, SOLUTION INTRAVENOUS CONTINUOUS
Status: DISCONTINUED | OUTPATIENT
Start: 2019-04-05 | End: 2019-04-05

## 2019-04-05 RX ORDER — SODIUM CHLORIDE 9 MG/ML
100 INJECTION, SOLUTION INTRAVENOUS CONTINUOUS
Status: DISCONTINUED | OUTPATIENT
Start: 2019-04-05 | End: 2019-04-06

## 2019-04-05 RX ADMIN — Medication 5 ML: at 22:30

## 2019-04-05 RX ADMIN — GABAPENTIN 100 MG: 100 CAPSULE ORAL at 17:50

## 2019-04-05 RX ADMIN — GABAPENTIN 100 MG: 100 CAPSULE ORAL at 08:56

## 2019-04-05 RX ADMIN — TAMSULOSIN HYDROCHLORIDE 0.4 MG: 0.4 CAPSULE ORAL at 08:55

## 2019-04-05 RX ADMIN — CLOPIDOGREL BISULFATE 75 MG: 75 TABLET ORAL at 08:56

## 2019-04-05 RX ADMIN — PANTOPRAZOLE SODIUM 40 MG: 40 TABLET, DELAYED RELEASE ORAL at 08:55

## 2019-04-05 RX ADMIN — METOPROLOL SUCCINATE 50 MG: 50 TABLET, EXTENDED RELEASE ORAL at 09:05

## 2019-04-05 RX ADMIN — LOSARTAN POTASSIUM 100 MG: 50 TABLET ORAL at 10:22

## 2019-04-05 RX ADMIN — POTASSIUM CHLORIDE 20 MEQ: 20 TABLET, EXTENDED RELEASE ORAL at 08:56

## 2019-04-05 RX ADMIN — CEFTRIAXONE SODIUM 1 G: 1 INJECTION, POWDER, FOR SOLUTION INTRAMUSCULAR; INTRAVENOUS at 15:22

## 2019-04-05 RX ADMIN — FUROSEMIDE 20 MG: 20 TABLET ORAL at 08:57

## 2019-04-05 RX ADMIN — Medication 10 ML: at 06:07

## 2019-04-05 RX ADMIN — RISPERIDONE 1 MG: 1 TABLET ORAL at 08:56

## 2019-04-05 RX ADMIN — HYDRALAZINE HYDROCHLORIDE 50 MG: 50 TABLET, FILM COATED ORAL at 09:05

## 2019-04-05 RX ADMIN — APIXABAN 5 MG: 5 TABLET, FILM COATED ORAL at 08:56

## 2019-04-05 RX ADMIN — LEVOTHYROXINE SODIUM 50 MCG: 50 TABLET ORAL at 08:56

## 2019-04-05 RX ADMIN — OXYCODONE HYDROCHLORIDE AND ACETAMINOPHEN 1 TABLET: 10; 325 TABLET ORAL at 20:37

## 2019-04-05 RX ADMIN — SODIUM CHLORIDE 100 ML/HR: 900 INJECTION, SOLUTION INTRAVENOUS at 15:25

## 2019-04-05 RX ADMIN — AMLODIPINE BESYLATE 10 MG: 10 TABLET ORAL at 09:05

## 2019-04-05 RX ADMIN — SODIUM CHLORIDE 500 ML: 900 INJECTION, SOLUTION INTRAVENOUS at 13:22

## 2019-04-05 RX ADMIN — Medication 5 ML: at 13:22

## 2019-04-05 RX ADMIN — ESCITALOPRAM OXALATE 20 MG: 10 TABLET ORAL at 08:56

## 2019-04-05 RX ADMIN — OXYCODONE HYDROCHLORIDE AND ACETAMINOPHEN 1 TABLET: 10; 325 TABLET ORAL at 11:51

## 2019-04-05 RX ADMIN — ISOSORBIDE MONONITRATE 30 MG: 30 TABLET, EXTENDED RELEASE ORAL at 08:58

## 2019-04-05 RX ADMIN — AMIODARONE HYDROCHLORIDE 100 MG: 200 TABLET ORAL at 08:57

## 2019-04-05 RX ADMIN — BISACODYL 5 MG: 5 TABLET, COATED ORAL at 20:37

## 2019-04-05 RX ADMIN — GABAPENTIN 100 MG: 100 CAPSULE ORAL at 22:30

## 2019-04-05 NOTE — PROGRESS NOTES
Initial visit to assess pt's spiritual needs. Feeling today? better Receiving good care?  yes Needs from Spiritual Care:  Devotional books. I provided [address] and 2 small booklets of prayers & thoughts by the [facility] of presence & prayer to demonstrate caring & concern, convey emotional & spiritual support.  
 
Chaplain Caden Ervin MDiv,ThM,PhD

## 2019-04-05 NOTE — PROGRESS NOTES
TRANSFER - IN REPORT: 
 
Verbal report received from ALON Webb(name) on Lori Lerma  being received from ER(unit) for routine progression of care Report consisted of patients Situation, Background, Assessment and  
Recommendations(SBAR). Information from the following report(s) SBAR, Kardex, ED Summary, Intake/Output, MAR, Accordion, Med Rec Status and Cardiac Rhythm Paced was reviewed with the receiving nurse. Opportunity for questions and clarification was provided. Assessment completed upon patients arrival to unit and care assumed.

## 2019-04-05 NOTE — PROGRESS NOTES
Home medications verified with Cardiology office visit 3/29. Patient hypotensive this afternoon. Will give patient 500cc bolus and adjust BP medications and monitor. PAtient's UA with 4+ bacteria. Rocephin started.  
 
Tish FERNÁNDEZC

## 2019-04-05 NOTE — PROGRESS NOTES
04/05/19 1257 Vital Signs Temp 97.3 °F (36.3 °C) Temp Source Temporal  
Pulse (Heart Rate) 70 Heart Rate Source Monitor Resp Rate 16  
O2 Sat (%) 93 % BP (!) 65/37 MAP (Calculated) (!) 46 BP 1 Method Automatic  
BP 1 Location Right arm More BP/Pulse rows needed? Yes Additional Blood Pressure/Pulse Data  
BP 2 (!) 71/40 MAP 2 (Calculated) (!) 50 BP 2 Location Left arm Patient Observation Observations BP checked BP low, Demetris Ponce NP paged and notified.

## 2019-04-05 NOTE — ED NOTES
TRANSFER - OUT REPORT: 
 
Verbal report given to [facility], RN on Caden Ferrera  being transferred to  for routine progression of care Report consisted of patients Situation, Background, Assessment and  
Recommendations(SBAR). Information from the following report(s) SBAR, ED Summary, [facility] and Recent Results was reviewed with the receiving nurse. Lines:  
Peripheral IV Left Forearm (Active) Site Assessment Clean, dry, & intact 4/4/2019  6:01 PM  
Phlebitis Assessment 0 4/4/2019  6:01 PM  
Infiltration Assessment 0 4/4/2019  6:01 PM  
Dressing Status Clean, dry, & intact 4/4/2019  6:01 PM  
Hub Color/Line Status Pink 4/4/2019  6:01 PM  
  
 
Opportunity for questions and clarification was provided. Patient transported with: 
 Monitor Registered Nurse

## 2019-04-05 NOTE — PROGRESS NOTES
Bedside and Verbal shift change report given to [address], RN (oncoming nurse) by self (offgoing nurse). Report included the following information SBAR, Kardex, Intake/Output, MAR, Recent Results and Cardiac Rhythm Paced.

## 2019-04-05 NOTE — PROGRESS NOTES
Bedside and Verbal shift change report given to self (oncoming nurse) by Mariana Klein RN (offgoing nurse). Report included the following information SBAR, Kardex, MAR and Recent Results.
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
## 2019-04-05 NOTE — PROGRESS NOTES
Care Management Interventions PCP Verified by CM: Yes 
Palliative Care Criteria Met (RRAT>21 & CHF Dx)?: No(RRAT 30 Dx weakness) Transition of Care Consult (CM Consult): Discharge Planning Discharge Durable Medical Equipment: No(walker) Physical Therapy Consult: Yes Occupational Therapy Consult: Yes Speech Therapy Consult: No 
Current Support Network: Lives with Spouse Confirm Follow Up Transport: Family Plan discussed with Pt/Family/Caregiver: Yes Freedom of Choice Offered: Yes Discharge Location Discharge Placement: [address] Met with patient and wife Chinmay Nickerson [phone] for d/c planning. Patient was just d/c home on 4/3 and readmitted 4/4/19 for weakness and a fall. Per wife she states she cannot take care of him and needs him placed in nursing home. Wife states that he has VA benefits and she has been told that he qualifies for SNF and had a list of SNF in the area. CM explained that not all of those on the list have a VA contract but that CM would be glad to check with any facility she is interested in to see if the have a contract with the [place]. She states would like somewhere close to where she lives so she can visit every day and reviewed facilities near her home and she states she knows where [facility] is and would like to go there if they can accept patient. CM notified Perri rodriguez for the rehab and they will check on connected benefits for the [place]. Referral sent to [facility] through connect care. Current d/c plan is long term nursing home if covered by [place]. Patient has used short term benefits several days and would need to see if any benefits available for short term if no VA benefits for long term. Patient does not have Medicaid and wife does not think he would qualify for Medicaid. Hopefully he has VA benefits for long term care and will be approved for [facility]. CM following.

## 2019-04-05 NOTE — PROGRESS NOTES
Hospitalist Progress Note Admit Date:  2019  4:31 PM  
Name:  Antone Cabot Age:  78 y.o. 
:  1950 MRN:  [id number] PCP:  Luis Antonio Reina MD 
Treatment Team: Attending Provider: Alfredo Elizabeth MD; Utilization Review: Jadiel Doe RN; Occupational Therapist: Melinda Gomez OT; Physical Therapist: Sharon Madrid, PT, DPT; Care Manager: Jefferson Velasco RN Subjective:  
Patient is a 77 yo male who was discharged the day before admission  from [facility] for weakness, who presented to the ER with his wife who stated she cannot care for him at home. She reports he fell hurting both knees and left hip and refusing to walk. ER workup with xrays do not show any significant injuries. Today patient alert and oriented x 2. Confused on exact date. Denied pain, SOB, fever/chills, n/v, reports no BM x 1 week. Patient was quiet, appeared to be fighting off tears. Objective:  
 
Patient Vitals for the past 24 hrs: 
 Temp Pulse Resp BP SpO2  
19 0857 97.5 °F (36.4 °C) 69 16 160/79 95 % 19 0447 98.3 °F (36.8 °C) 66 17 139/87 97 % 19 0045 99 °F (37.2 °C) 65 17 128/76 95 % 19 2316  64  180/84   
19 2205 99.4 °F (37.4 °C) 64 17 183/80 95 % 19 2146  73 11 (!) 186/91 96 % 19 2131  77 16 197/88 96 % 19 2104  76 18 (!) 196/95 96 % 19 2102  76 17 (!) 199/96 95 % 19 2002  71 18 (!) 197/100 95 % 19 1901  73 16 173/81 95 % 19 1845  71 18 189/89 92 % 19 1621 98.6 °F (37 °C) 69 16 148/78 92 % Oxygen Therapy O2 Sat (%): 95 % (19 0857) Pulse via Oximetry: 73 beats per minute (19 1856) O2 Device: Room air (19 2947) Intake/Output Summary (Last 24 hours) at 2019 1053 Last data filed at 2019 9043 Gross per 24 hour Intake 180 ml Output  Net 180 ml General:    Well nourished. Alert. CV:   RRR. No murmur, rub, or gallop. Lungs:   CTAB. No wheezing, rhonchi, or rales. Room air. Abdomen:   Soft, nontender, nondistended. Bowel sounds present Extremities: Warm and dry. No cyanosis or edema. Weakness in all extremities. Skin:     No rashes or jaundice. Data Review: 
I have reviewed all labs, meds, telemetry events, and studies from the last 24 hours. Recent Results (from the past 24 hour(s)) CBC WITH AUTOMATED DIFF Collection Time: 04/04/19  4:41 PM  
Result Value Ref Range WBC 6.4 4.3 - 11.1 K/uL  
 RBC 4.11 (L) 4.23 - 5.6 M/uL  
 HGB 12.4 (L) 13.6 - 17.2 g/dL HCT 39.0 (L) 41.1 - 50.3 % MCV 94.9 79.6 - 97.8 FL  
 MCH 30.2 26.1 - 32.9 PG  
 MCHC 31.8 31.4 - 35.0 g/dL  
 RDW 15.1 (H) 11.9 - 14.6 % PLATELET 665 077 - 523 K/uL MPV 11.2 9.4 - 12.3 FL ABSOLUTE NRBC 0.00 0.0 - 0.2 K/uL  
 DF AUTOMATED NEUTROPHILS 51 43 - 78 % LYMPHOCYTES 37 13 - 44 % MONOCYTES 9 4.0 - 12.0 % EOSINOPHILS 3 0.5 - 7.8 % BASOPHILS 0 0.0 - 2.0 % IMMATURE GRANULOCYTES 0 0.0 - 5.0 %  
 ABS. NEUTROPHILS 3.2 1.7 - 8.2 K/UL  
 ABS. LYMPHOCYTES 2.3 0.5 - 4.6 K/UL  
 ABS. MONOCYTES 0.6 0.1 - 1.3 K/UL  
 ABS. EOSINOPHILS 0.2 0.0 - 0.8 K/UL  
 ABS. BASOPHILS 0.0 0.0 - 0.2 K/UL  
 ABS. IMM. GRANS. 0.0 0.0 - 0.5 K/UL METABOLIC PANEL, COMPREHENSIVE Collection Time: 04/04/19  4:41 PM  
Result Value Ref Range Sodium 145 136 - 145 mmol/L Potassium 4.0 3.5 - 5.1 mmol/L Chloride 109 (H) 98 - 107 mmol/L  
 CO2 32 21 - 32 mmol/L Anion gap 4 (L) 7 - 16 mmol/L Glucose 86 65 - 100 mg/dL BUN 14 8 - 23 MG/DL Creatinine 1.20 0.8 - 1.5 MG/DL  
 GFR est AA >60 >60 ml/min/1.73m2 GFR est non-AA >60 >60 ml/min/1.73m2 Calcium 8.7 8.3 - 10.4 MG/DL Bilirubin, total 0.4 0.2 - 1.1 MG/DL  
 ALT (SGPT) 17 12 - 65 U/L  
 AST (SGOT) 22 15 - 37 U/L Alk. phosphatase 67 50 - 136 U/L Protein, total 6.9 6.3 - 8.2 g/dL Albumin 3.0 (L) 3.2 - 4.6 g/dL Globulin 3.9 (H) 2.3 - 3.5 g/dL A-G Ratio 0.8 (L) 1.2 - 3.5 MAGNESIUM Collection Time: 04/04/19  4:41 PM  
Result Value Ref Range Magnesium 2.0 1.8 - 2.4 mg/dL PHOSPHORUS Collection Time: 04/04/19  4:41 PM  
Result Value Ref Range Phosphorus 3.4 2.3 - 3.7 MG/DL  
TROPONIN I Collection Time: 04/04/19  4:41 PM  
Result Value Ref Range Troponin-I, Qt. 0.02 0.02 - 0.05 NG/ML  
TSH 3RD GENERATION Collection Time: 04/04/19  4:41 PM  
Result Value Ref Range TSH 6.780 (H) 0.358 - 3.740 uIU/mL EKG, 12 LEAD, INITIAL Collection Time: 04/04/19  5:06 PM  
Result Value Ref Range Ventricular Rate 72 BPM  
 Atrial Rate 68 BPM  
 P-R Interval 114 ms QRS Duration 92 ms Q-T Interval 436 ms  
 QTC Calculation (Bezet) 477 ms Calculated P Axis -6 degrees Calculated R Axis -6 degrees Calculated T Axis 77 degrees Diagnosis    
  !! AGE AND GENDER SPECIFIC ECG ANALYSIS !! Normal sinus rhythm Septal infarct (cited on or before 14-MAR-2019) Abnormal ECG When compared with ECG of 14-MAR-2019 10:50, 
Questionable change in initial forces of Septal leads ST less depressed in Inferior leads Nonspecific T wave abnormality no longer evident in Inferior leads Nonspecific T wave abnormality, improved in Lateral leads Confirmed by DELIA GUZMAN (), Alissa Shaikh ([id number]) on 4/5/2019 1:59:86 AM 
  
METABOLIC PANEL, BASIC Collection Time: 04/05/19  4:35 AM  
Result Value Ref Range Sodium 143 136 - 145 mmol/L Potassium 3.9 3.5 - 5.1 mmol/L Chloride 109 (H) 98 - 107 mmol/L  
 CO2 28 21 - 32 mmol/L Anion gap 6 (L) 7 - 16 mmol/L Glucose 125 (H) 65 - 100 mg/dL BUN 11 8 - 23 MG/DL Creatinine 0.90 0.8 - 1.5 MG/DL  
 GFR est AA >60 >60 ml/min/1.73m2 GFR est non-AA >60 >60 ml/min/1.73m2 Calcium 8.5 8.3 - 10.4 MG/DL  
CBC WITH AUTOMATED DIFF Collection Time: 04/05/19  4:35 AM  
Result Value Ref Range WBC 5.2 4.3 - 11.1 K/uL  
 RBC 4.09 (L) 4.23 - 5.6 M/uL  
 HGB 12.4 (L) 13.6 - 17.2 g/dL HCT 38.0 (L) 41.1 - 50.3 % MCV 92.9 79.6 - 97.8 FL  
 MCH 30.3 26.1 - 32.9 PG  
 MCHC 32.6 31.4 - 35.0 g/dL  
 RDW 14.9 (H) 11.9 - 14.6 % PLATELET 731 269 - 935 K/uL MPV 11.0 9.4 - 12.3 FL ABSOLUTE NRBC 0.00 0.0 - 0.2 K/uL  
 DF AUTOMATED NEUTROPHILS 53 43 - 78 % LYMPHOCYTES 34 13 - 44 % MONOCYTES 11 4.0 - 12.0 % EOSINOPHILS 2 0.5 - 7.8 % BASOPHILS 1 0.0 - 2.0 % IMMATURE GRANULOCYTES 0 0.0 - 5.0 %  
 ABS. NEUTROPHILS 2.7 1.7 - 8.2 K/UL  
 ABS. LYMPHOCYTES 1.8 0.5 - 4.6 K/UL  
 ABS. MONOCYTES 0.6 0.1 - 1.3 K/UL  
 ABS. EOSINOPHILS 0.1 0.0 - 0.8 K/UL  
 ABS. BASOPHILS 0.0 0.0 - 0.2 K/UL  
 ABS. IMM. GRANS. 0.0 0.0 - 0.5 K/UL All Micro Results Procedure Component Value Units Date/Time CULTURE, URINE [[id number]] Order Status:  Sent Specimen:  Urine from Clean catch Current Meds: 
Current Facility-Administered Medications Medication Dose Route Frequency  amiodarone (CORDARONE) tablet 100 mg  100 mg Oral DAILY  amLODIPine (NORVASC) tablet 10 mg  10 mg Oral DAILY  apixaban (ELIQUIS) tablet 5 mg  5 mg Oral Q12H  clopidogrel (PLAVIX) tablet 75 mg  75 mg Oral DAILY AFTER BREAKFAST  escitalopram oxalate (LEXAPRO) tablet 20 mg  20 mg Oral DAILY  furosemide (LASIX) tablet 20 mg  20 mg Oral DAILY  gabapentin (NEURONTIN) capsule 100 mg  100 mg Oral TID  galantamine (RAZADYNE) tablet 4 mg  4 mg Oral BID  hydrALAZINE (APRESOLINE) tablet 50 mg  50 mg Oral TID  isosorbide mononitrate ER (IMDUR) tablet 30 mg  30 mg Oral DAILY  levothyroxine (SYNTHROID) tablet 50 mcg  50 mcg Oral ACB  losartan (COZAAR) tablet 100 mg  100 mg Oral DAILY  metoprolol succinate (TOPROL-XL) XL tablet 50 mg  50 mg Oral DAILY  morphine IR (MS IR) tablet 15 mg  15 mg Oral Q4H PRN  pantoprazole (PROTONIX) tablet 40 mg  40 mg Oral DAILY  potassium chloride (K-DUR, KLOR-CON) SR tablet 20 mEq  20 mEq Oral DAILY  prazosin (MINIPRESS) capsule 1 mg  1 mg Oral QHS  risperiDONE (RisperDAL) tablet 1 mg  1 mg Oral DAILY  tamsulosin (FLOMAX) capsule 0.4 mg  0.4 mg Oral DAILY  sodium chloride (NS) flush 5-40 mL  5-40 mL IntraVENous Q8H  
 sodium chloride (NS) flush 5-40 mL  5-40 mL IntraVENous PRN  
 acetaminophen (TYLENOL) tablet 650 mg  650 mg Oral Q4H PRN  
 naloxone (NARCAN) injection 0.4 mg  0.4 mg IntraVENous PRN  
 diphenhydrAMINE (BENADRYL) capsule 25 mg  25 mg Oral Q6H PRN  
 ondansetron (ZOFRAN) injection 4 mg  4 mg IntraVENous Q4H PRN  
 bisacodyl (DULCOLAX) tablet 5 mg  5 mg Oral DAILY PRN  
 LORazepam (ATIVAN) tablet 1 mg  1 mg Oral BID PRN  
 oxyCODONE-acetaminophen (PERCOCET 10)  mg per tablet 1 Tab  1 Tab Oral Q4H PRN  
 tuberculin injection 5 Units  5 Units IntraDERMal ONCE Other Studies (last 24 hours): Xr Hip Lt W Or Wo Pelv 2-3 Vws Result Date: 4/4/2019 LEFT HIP, 3 views. HISTORY: Hip pain following fall. TECHNIQUE: AP view of the pelvis and coned down AP and frogleg lateral of the hip. FINDINGS: Bony pelvic ring appears intact. SI joints symmetric. Pubic rami also appear intact. Left femoral head has a round smooth contour. Femoral neck and proximal shaft are intact. There are arterial calcifications. Degenerative changes lower lumbar spine. IMPRESSION:  Negative for hip fracture. Xr Chest [facility] Result Date: 4/4/2019 CHEST X-RAY, one view. HISTORY:  Lethargy and pain all over. Shortness of breath TECHNIQUE:  AP upright portable view. COMPARISON: 14 and March 2019 FINDINGS:  The lungs are clear. The heart size is normal. The costophrenic angles are sharp. The pulmonary vasculature is unremarkable. Included portion of the upper abdomen is unremarkable. Aortic calcifications, spinal stimulator type device and cardiac pacemaker are present. IMPRESSION:  Negative for acute change. Xr Knee Lt 3 V Result Date: 4/4/2019 LEFT KNEE 3 view(s). HISTORY: Left knee pain following fall. TECHNIQUE: AP and lateral and oblique views. COMPARISON: November 2017 study. FINDINGS: No fracture, subluxation or dislocation. Mild degenerative changes. There is some chondrocalcinosis. IMPRESSION: Negative for acute fracture. Xr Knee Rt 3 V Result Date: 4/4/2019 RIGHT KNEE 3 view(s). HISTORY: Right knee pain following fall. TECHNIQUE: AP and lateral and oblique views. COMPARISON: None. FINDINGS: No fracture, subluxation or dislocation. No joint effusion. There are arterial calcifications. IMPRESSION: Negative for acute fracture. Assessment and Plan:  
 
Hospital Problems as of 4/5/2019 Date Reviewed: 3/14/2019 Codes Class Noted - Resolved POA Weakness generalized ICD-10-CM: R53.1 ICD-9-CM: 780.79  4/4/2019 - Present Unknown Atrial fibrillation (HCC) (Chronic) ICD-10-CM: I48.91 
ICD-9-CM: 427.31  3/14/2019 - Present Yes Type 2 diabetes with nephropathy (HCC) (Chronic) ICD-10-CM: E11.21 
ICD-9-CM: 250.40, 583.81  11/6/2018 - Present Yes * (Principal) Syncope ICD-10-CM: R55 
ICD-9-CM: 780.2  6/29/2016 - Present Yes Overview Signed 6/29/2016 10:48 PM by Sheyla White. Questionable Type 2 diabetes mellitus with diabetic polyneuropathy (HCC) (Chronic) ICD-10-CM: E11.42 
ICD-9-CM: 250.60, 357.2  7/13/2015 - Present Yes Chronic pain syndrome (Chronic) ICD-10-CM: G89.4 ICD-9-CM: 338.4  8/8/2014 - Present Yes Dementia (Chronic) ICD-10-CM: F03.90 ICD-9-CM: 294.20  5/2/2014 - Present Yes Hypertension (Chronic) ICD-10-CM: I10 
ICD-9-CM: 401.9  1/12/2012 - Present Yes PLAN:   
Principal Problem: 
Weakness -PT/OT/ppd/cm 
-check UA & CS 
-Check daily labs  
  
Active Problems: Hypertension (1/12/2012) Continue home meds and add prn hydralazine, if needed. 
  
  Dementia (5/2/2014)   Chronic condition is stable,  
 
 Chronic pain syndrome (8/8/2014) Chronic condition is stable  [facility] (7/13/2015) Type 2 diabetes with nephropathy ([facility] (11/6/2018) -SSI 
  
  Atrial fibrillation ([facility] (3/14/2019) Chronic condition is stable May need to Re-consider anti-coagulation prior to d/c due to falls; currently on eliquis and plavix 
  
 Hypothyroidism TSH 6.780 Check free T4 
 
DVT prophylaxis:  eliquis Code status: Full;  HCPOA: on file 
  
Plan of care discussed with Dr. Sonya Jacques 
 
Signed: 
HEIDI Falk

## 2019-04-05 NOTE — PROGRESS NOTES
Bedside and Verbal shift change report given to [facility] (oncoming nurse) by self (offgoing nurse). Report included the following information SBAR, Kardex, Intake/Output, MAR, Recent Results and Med Rec Status.

## 2019-04-05 NOTE — ED NOTES
Spoke to abdi Cormier regarding htn. rn aware and accepted pt.  Will wheel pt up on tele monitor with rn

## 2019-04-06 LAB
ANION GAP SERPL CALC-SCNC: 5 MMOL/L (ref 7–16)
BASOPHILS # BLD: 0 K/UL (ref 0–0.2)
BASOPHILS NFR BLD: 0 % (ref 0–2)
BUN SERPL-MCNC: 11 MG/DL (ref 8–23)
CALCIUM SERPL-MCNC: 8.1 MG/DL (ref 8.3–10.4)
CHLORIDE SERPL-SCNC: 106 MMOL/L (ref 98–107)
CO2 SERPL-SCNC: 29 MMOL/L (ref 21–32)
CREAT SERPL-MCNC: 1 MG/DL (ref 0.8–1.5)
DIFFERENTIAL METHOD BLD: ABNORMAL
EOSINOPHIL # BLD: 0.1 K/UL (ref 0–0.8)
EOSINOPHIL NFR BLD: 1 % (ref 0.5–7.8)
ERYTHROCYTE [DISTWIDTH] IN BLOOD BY AUTOMATED COUNT: 14.7 % (ref 11.9–14.6)
GLUCOSE BLD STRIP.AUTO-MCNC: 120 MG/DL (ref 65–100)
GLUCOSE BLD STRIP.AUTO-MCNC: 131 MG/DL (ref 65–100)
GLUCOSE BLD STRIP.AUTO-MCNC: 137 MG/DL (ref 65–100)
GLUCOSE BLD STRIP.AUTO-MCNC: 144 MG/DL (ref 65–100)
GLUCOSE SERPL-MCNC: 209 MG/DL (ref 65–100)
HCT VFR BLD AUTO: 37.8 % (ref 41.1–50.3)
HGB BLD-MCNC: 12.4 G/DL (ref 13.6–17.2)
IMM GRANULOCYTES # BLD AUTO: 0 K/UL (ref 0–0.5)
IMM GRANULOCYTES NFR BLD AUTO: 0 % (ref 0–5)
LYMPHOCYTES # BLD: 0.7 K/UL (ref 0.5–4.6)
LYMPHOCYTES NFR BLD: 14 % (ref 13–44)
MCH RBC QN AUTO: 30.4 PG (ref 26.1–32.9)
MCHC RBC AUTO-ENTMCNC: 32.8 G/DL (ref 31.4–35)
MCV RBC AUTO: 92.6 FL (ref 79.6–97.8)
MM INDURATION POC: 0 MM (ref 0–5)
MM INDURATION POC: 0 MM (ref 0–5)
MONOCYTES # BLD: 0.4 K/UL (ref 0.1–1.3)
MONOCYTES NFR BLD: 8 % (ref 4–12)
NEUTS SEG # BLD: 4 K/UL (ref 1.7–8.2)
NEUTS SEG NFR BLD: 77 % (ref 43–78)
NRBC # BLD: 0 K/UL (ref 0–0.2)
PLATELET # BLD AUTO: 144 K/UL (ref 150–450)
PMV BLD AUTO: 11.5 FL (ref 9.4–12.3)
POTASSIUM SERPL-SCNC: 3.7 MMOL/L (ref 3.5–5.1)
PPD POC: NEGATIVE NEGATIVE
PPD POC: NEGATIVE NEGATIVE
RBC # BLD AUTO: 4.08 M/UL (ref 4.23–5.6)
SODIUM SERPL-SCNC: 140 MMOL/L (ref 136–145)
WBC # BLD AUTO: 5.1 K/UL (ref 4.3–11.1)

## 2019-04-06 PROCEDURE — 99218 HC RM OBSERVATION: CPT

## 2019-04-06 PROCEDURE — 85025 COMPLETE CBC W/AUTO DIFF WBC: CPT

## 2019-04-06 PROCEDURE — 3331090002 HH PPS REVENUE DEBIT

## 2019-04-06 PROCEDURE — 74011000258 HC RX REV CODE- 258: Performed by: NURSE PRACTITIONER

## 2019-04-06 PROCEDURE — 96361 HYDRATE IV INFUSION ADD-ON: CPT

## 2019-04-06 PROCEDURE — 36415 COLL VENOUS BLD VENIPUNCTURE: CPT

## 2019-04-06 PROCEDURE — 80048 BASIC METABOLIC PNL TOTAL CA: CPT

## 2019-04-06 PROCEDURE — 3331090001 HH PPS REVENUE CREDIT

## 2019-04-06 PROCEDURE — 97535 SELF CARE MNGMENT TRAINING: CPT

## 2019-04-06 PROCEDURE — 74011250636 HC RX REV CODE- 250/636: Performed by: NURSE PRACTITIONER

## 2019-04-06 PROCEDURE — 77030020253 HC SOL INJ D545NS .05 DEX .45 SAL

## 2019-04-06 PROCEDURE — 74011250637 HC RX REV CODE- 250/637: Performed by: FAMILY MEDICINE

## 2019-04-06 PROCEDURE — 77030032490 HC SLV COMPR SCD KNE COVD -B

## 2019-04-06 PROCEDURE — 97166 OT EVAL MOD COMPLEX 45 MIN: CPT

## 2019-04-06 PROCEDURE — 96366 THER/PROPH/DIAG IV INF ADDON: CPT

## 2019-04-06 PROCEDURE — 82962 GLUCOSE BLOOD TEST: CPT

## 2019-04-06 RX ADMIN — HYDRALAZINE HYDROCHLORIDE 50 MG: 50 TABLET, FILM COATED ORAL at 22:17

## 2019-04-06 RX ADMIN — ESCITALOPRAM OXALATE 20 MG: 10 TABLET ORAL at 08:51

## 2019-04-06 RX ADMIN — CEFTRIAXONE SODIUM 1 G: 1 INJECTION, POWDER, FOR SOLUTION INTRAMUSCULAR; INTRAVENOUS at 15:21

## 2019-04-06 RX ADMIN — HYDRALAZINE HYDROCHLORIDE 50 MG: 50 TABLET, FILM COATED ORAL at 16:34

## 2019-04-06 RX ADMIN — FUROSEMIDE 20 MG: 20 TABLET ORAL at 09:13

## 2019-04-06 RX ADMIN — RISPERIDONE 1 MG: 1 TABLET ORAL at 08:51

## 2019-04-06 RX ADMIN — CLOPIDOGREL BISULFATE 75 MG: 75 TABLET ORAL at 08:51

## 2019-04-06 RX ADMIN — GABAPENTIN 100 MG: 100 CAPSULE ORAL at 08:51

## 2019-04-06 RX ADMIN — METOPROLOL SUCCINATE 50 MG: 50 TABLET, EXTENDED RELEASE ORAL at 09:12

## 2019-04-06 RX ADMIN — PANTOPRAZOLE SODIUM 40 MG: 40 TABLET, DELAYED RELEASE ORAL at 08:51

## 2019-04-06 RX ADMIN — GABAPENTIN 100 MG: 100 CAPSULE ORAL at 16:34

## 2019-04-06 RX ADMIN — Medication 5 ML: at 13:56

## 2019-04-06 RX ADMIN — HYDRALAZINE HYDROCHLORIDE 50 MG: 50 TABLET, FILM COATED ORAL at 09:12

## 2019-04-06 RX ADMIN — POTASSIUM CHLORIDE 20 MEQ: 20 TABLET, EXTENDED RELEASE ORAL at 08:51

## 2019-04-06 RX ADMIN — LEVOTHYROXINE SODIUM 50 MCG: 50 TABLET ORAL at 06:17

## 2019-04-06 RX ADMIN — GABAPENTIN 100 MG: 100 CAPSULE ORAL at 22:17

## 2019-04-06 RX ADMIN — PRAZOSIN HYDROCHLORIDE 1 MG: 1 CAPSULE ORAL at 22:17

## 2019-04-06 RX ADMIN — Medication 5 ML: at 06:17

## 2019-04-06 RX ADMIN — Medication 5 ML: at 22:17

## 2019-04-06 RX ADMIN — TAMSULOSIN HYDROCHLORIDE 0.4 MG: 0.4 CAPSULE ORAL at 08:51

## 2019-04-06 RX ADMIN — SODIUM CHLORIDE 100 ML/HR: 900 INJECTION, SOLUTION INTRAVENOUS at 03:08

## 2019-04-06 NOTE — PROGRESS NOTES
Hospitalist Progress Note Admit Date:  2019  4:31 PM  
Name:  Isai Cerna Age:  78 y.o. 
:  1950 MRN:  [id number] PCP:  Rocio Edmondson MD 
Treatment Team: Attending Provider: Nida Amaro MD; Utilization Review: Dinesh Soares RN; Care Manager: Klaus Ferguson RN; Occupational Therapist: Shana Arrington OT Subjective:  
Patient is a 77 yo male who was [facility] the day before admission  [facility], who presented to the ER with his wife who stated she cannot care for him at home. She reports he fell hurting both knees and left hip and refusing to walk. ER workup with xrays do not show any significant injuries. Patient alert and oriented. Flat affect. Patient with UTI with 4+ bacteria started on rocephin . Patient hypotensive yesterday, 500cc fluid bolus given. Blood pressure medications adjusted today. /76 this morning. Patient with no leukocytosis, afebrile. Objective:  
 
Patient Vitals for the past 24 hrs: 
 Temp Pulse Resp BP SpO2  
19 0850 98.8 °F (37.1 °C) 65 16 157/76 96 % 19 0455 99.3 °F (37.4 °C) 68 16 130/66 94 % 19 0026 99.5 °F (37.5 °C) 69 12 98/65 96 % 19 2111 98.3 °F (36.8 °C) 67 14 131/77 93 % 19 1700 98 °F (36.7 °C) 72 16 118/70 93 % 19 1540    127/71   
19 1257 97.3 °F (36.3 °C) 70 16 (!) 65/37 93 % Oxygen Therapy O2 Sat (%): 96 % (19 0850) Pulse via Oximetry: 73 beats per minute (19 2146) O2 Device: Room air (19 0850) Intake/Output Summary (Last 24 hours) at 2019 1102 Last data filed at 2019 7088 Gross per 24 hour Intake 500 ml Output  Net 500 ml General:    Well nourished. Alert. Flat affect CV:   RRR. No murmur, rub, or gallop. Lungs:   CTAB. No wheezing, rhonchi, or rales. Room air. Abdomen:   Soft, nontender, nondistended. Bowel sounds present Extremities: Warm and dry. No cyanosis or edema. Weakness in all extremities. Skin:     No rashes or jaundice. Data Review: 
I have reviewed all labs, meds, telemetry events, and studies from the last 24 hours. Recent Results (from the past 24 hour(s)) GLUCOSE, POC Collection Time: 04/05/19 11:08 AM  
Result Value Ref Range Glucose (POC) 147 (H) 65 - 100 mg/dL CULTURE, URINE Collection Time: 04/05/19  1:43 PM  
Result Value Ref Range Special Requests: NO SPECIAL REQUESTS Culture result:     
  NO GROWTH AFTER SHORT PERIOD OF INCUBATION. FURTHER RESULTS TO FOLLOW AFTER OVERNIGHT INCUBATION. URINALYSIS W/ RFLX MICROSCOPIC Collection Time: 04/05/19  1:44 PM  
Result Value Ref Range Color YELLOW Appearance CLOUDY Specific gravity 1.014 1.001 - 1.023    
 pH (UA) 8.5 5.0 - 9.0 Protein NEGATIVE  NEG mg/dL Glucose NEGATIVE  mg/dL Ketone NEGATIVE  NEG mg/dL Bilirubin NEGATIVE  NEG Blood NEGATIVE  NEG Urobilinogen 0.2 0.2 - 1.0 EU/dL Nitrites NEGATIVE  NEG Leukocyte Esterase SMALL (A) NEG    
 WBC 10-20 0 /hpf  
 RBC 0-3 0 /hpf Epithelial cells 0-3 0 /hpf Bacteria 4+ (H) 0 /hpf Casts 0-3 0 /lpf  
GLUCOSE, POC Collection Time: 04/05/19  4:53 PM  
Result Value Ref Range Glucose (POC) 129 (H) 65 - 100 mg/dL GLUCOSE, POC Collection Time: 04/05/19  9:13 PM  
Result Value Ref Range Glucose (POC) 146 (H) 65 - 100 mg/dL PLEASE READ & DOCUMENT PPD TEST IN 24 HRS Collection Time: 04/05/19 11:17 PM  
Result Value Ref Range PPD Negative Negative  
 mm Induration 0 0 - 5 mm GLUCOSE, POC Collection Time: 04/06/19  6:12 AM  
Result Value Ref Range Glucose (POC) 131 (H) 65 - 100 mg/dL CBC WITH AUTOMATED DIFF Collection Time: 04/06/19  8:45 AM  
Result Value Ref Range WBC 5.1 4.3 - 11.1 K/uL  
 RBC 4.08 (L) 4.23 - 5.6 M/uL  
 HGB 12.4 (L) 13.6 - 17.2 g/dL HCT 37.8 (L) 41.1 - 50.3 %  MCV 92.6 79.6 - 97.8 FL  
 MCH 30.4 26.1 - 32.9 PG  
 MCHC 32.8 31.4 - 35.0 g/dL  
 RDW 14.7 (H) 11.9 - 14.6 % PLATELET 255 (L) 531 - 450 K/uL MPV 11.5 9.4 - 12.3 FL ABSOLUTE NRBC 0.00 0.0 - 0.2 K/uL  
 DF AUTOMATED NEUTROPHILS 77 43 - 78 % LYMPHOCYTES 14 13 - 44 % MONOCYTES 8 4.0 - 12.0 % EOSINOPHILS 1 0.5 - 7.8 % BASOPHILS 0 0.0 - 2.0 % IMMATURE GRANULOCYTES 0 0.0 - 5.0 %  
 ABS. NEUTROPHILS 4.0 1.7 - 8.2 K/UL  
 ABS. LYMPHOCYTES 0.7 0.5 - 4.6 K/UL  
 ABS. MONOCYTES 0.4 0.1 - 1.3 K/UL  
 ABS. EOSINOPHILS 0.1 0.0 - 0.8 K/UL  
 ABS. BASOPHILS 0.0 0.0 - 0.2 K/UL  
 ABS. IMM. GRANS. 0.0 0.0 - 0.5 K/UL METABOLIC PANEL, BASIC Collection Time: 04/06/19  8:45 AM  
Result Value Ref Range Sodium 140 136 - 145 mmol/L Potassium 3.7 3.5 - 5.1 mmol/L Chloride 106 98 - 107 mmol/L  
 CO2 29 21 - 32 mmol/L Anion gap 5 (L) 7 - 16 mmol/L Glucose 209 (H) 65 - 100 mg/dL BUN 11 8 - 23 MG/DL Creatinine 1.00 0.8 - 1.5 MG/DL  
 GFR est AA >60 >60 ml/min/1.73m2 GFR est non-AA >60 >60 ml/min/1.73m2 Calcium 8.1 (L) 8.3 - 10.4 MG/DL All Micro Results Procedure Component Value Units Date/Time Linda Wang [[id number]] Collected:  04/05/19 1343 Order Status:  Completed Specimen:  Cath Urine Updated:  04/06/19 1236 Special Requests: NO SPECIAL REQUESTS Culture result:    
  NO GROWTH AFTER SHORT PERIOD OF INCUBATION. FURTHER RESULTS TO FOLLOW AFTER OVERNIGHT INCUBATION. CULTURE, URINE [[id number]] Order Status:  Canceled Specimen:  Urine from Clean catch Current Meds: 
Current Facility-Administered Medications Medication Dose Route Frequency  lip protectant (BLISTEX) ointment   Topical PRN  
 cefTRIAXone (ROCEPHIN) 1 g in 0.9% sodium chloride (MBP/ADV) 50 mL  1 g IntraVENous Q24H  
 [Held by provider] amiodarone (CORDARONE) tablet 100 mg  100 mg Oral DAILY  [Held by provider] amLODIPine (NORVASC) tablet 10 mg  10 mg Oral DAILY  clopidogrel (PLAVIX) tablet 75 mg  75 mg Oral DAILY AFTER BREAKFAST  escitalopram oxalate (LEXAPRO) tablet 20 mg  20 mg Oral DAILY  furosemide (LASIX) tablet 20 mg  20 mg Oral DAILY  gabapentin (NEURONTIN) capsule 100 mg  100 mg Oral TID  hydrALAZINE (APRESOLINE) tablet 50 mg  50 mg Oral TID  [Held by provider] isosorbide mononitrate ER (IMDUR) tablet 30 mg  30 mg Oral DAILY  levothyroxine (SYNTHROID) tablet 50 mcg  50 mcg Oral ACB  [Held by provider] losartan (COZAAR) tablet 100 mg  100 mg Oral DAILY  metoprolol succinate (TOPROL-XL) XL tablet 50 mg  50 mg Oral DAILY  morphine IR (MS IR) tablet 15 mg  15 mg Oral Q4H PRN  pantoprazole (PROTONIX) tablet 40 mg  40 mg Oral DAILY  potassium chloride (K-DUR, KLOR-CON) SR tablet 20 mEq  20 mEq Oral DAILY  [Held by provider] prazosin (MINIPRESS) capsule 1 mg  1 mg Oral QHS  risperiDONE (RisperDAL) tablet 1 mg  1 mg Oral DAILY  tamsulosin (FLOMAX) capsule 0.4 mg  0.4 mg Oral DAILY  sodium chloride (NS) flush 5-40 mL  5-40 mL IntraVENous Q8H  
 sodium chloride (NS) flush 5-40 mL  5-40 mL IntraVENous PRN  
 acetaminophen (TYLENOL) tablet 650 mg  650 mg Oral Q4H PRN  
 naloxone (NARCAN) injection 0.4 mg  0.4 mg IntraVENous PRN  
 diphenhydrAMINE (BENADRYL) capsule 25 mg  25 mg Oral Q6H PRN  
 ondansetron (ZOFRAN) injection 4 mg  4 mg IntraVENous Q4H PRN  
 bisacodyl (DULCOLAX) tablet 5 mg  5 mg Oral DAILY PRN  
 LORazepam (ATIVAN) tablet 1 mg  1 mg Oral BID PRN  
 oxyCODONE-acetaminophen (PERCOCET 10)  mg per tablet 1 Tab  1 Tab Oral Q4H PRN Other Studies (last 24 hours): No results found. Assessment and Plan:  
 
Hospital Problems as of 4/6/2019 Date Reviewed: 3/14/2019 Codes Class Noted - Resolved POA Weakness generalized ICD-10-CM: R53.1 ICD-9-CM: 780.79  4/4/2019 - Present Unknown Atrial fibrillation (HCC) (Chronic) ICD-10-CM: I48.91 
ICD-9-CM: 427.31  3/14/2019 - Present Yes Type 2 diabetes with nephropathy (HCC) (Chronic) ICD-10-CM: E11.21 
ICD-9-CM: 250.40, 583.81  11/6/2018 - Present Yes * (Principal) Syncope ICD-10-CM: R55 
ICD-9-CM: 780.2  6/29/2016 - Present Yes Overview Signed 6/29/2016 10:48 PM by Kathleen Acevedo. Questionable Type 2 diabetes mellitus with diabetic polyneuropathy (HCC) (Chronic) ICD-10-CM: E11.42 
ICD-9-CM: 250.60, 357.2  7/13/2015 - Present Yes Chronic pain syndrome (Chronic) ICD-10-CM: G89.4 ICD-9-CM: 338.4  8/8/2014 - Present Yes Dementia (Chronic) ICD-10-CM: F03.90 ICD-9-CM: 294.20  5/2/2014 - Present Yes Hypertension (Chronic) ICD-10-CM: I10 
ICD-9-CM: 401.9  1/12/2012 - Present Yes PLAN:   
Principal Problem: 
Weakness -PT/OT/ppd/cm 
-check UA & CS 
-Check daily labs UTI 
-rocephin started 4/5 
-follow urine culture 
 
  
Active Problems: Hypertension (1/12/2012) patient hypotensive after administration of meds 4/5 Patient on many blood pressure medications verified from recent cardiology office visit. Will adjust dosages and monitor.  
  
  Dementia (5/2/2014) Chronic condition is stable, Chronic pain syndrome (8/8/2014) Chronic condition is stable  Type 2 diabetes mellitus with diabetic polyneuropathy ([facility] (7/13/2015) Type 2 diabetes with nephropathy ([facility] (11/6/2018) -SSI 
  
  Atrial fibrillation ([facility] (3/14/2019) Chronic condition is stable May need to Re-consider anti-coagulation prior to d/c due to falls. eliquis held. Plavix continues.  
  
 Hypothyroidism TSH 6.78 - dose recently increased at physician visit. DVT prophylaxis:  scd's Code status: Full;  HCPOA: on file 
  
Plan of care discussed with Dr. Veronica Bunch 
 
Signed: 
HEIDI Chino

## 2019-04-06 NOTE — PROGRESS NOTES
Problem: Self Care Deficits Care Plan (Adult) Goal: *Acute Goals and Plan of Care (Insert Text) Description 1. Patient will complete upper body bathing and dressing with setup and adaptive equipment as needed. 2. Patient will complete lower body bathing and dressing with mod A and adaptive equipment as needed. 3. Patient will complete toileting with mod A.  
4. Patient will tolerate 25 minutes of OT treatment with 3 rest breaks to increase activity tolerance for ADLs. 5. Patient will complete functional transfers with CGA and adaptive equipment as needed. Timeframe: 7 visits Outcome: Progressing Towards Goal 
  
 
 
OCCUPATIONAL THERAPY: Initial Assessment, Daily Note and PM 
 4/6/2019 OBSERVATION: OT Visit Days: 1 Payor: Keisha Arrieta / Plan: Financial [employer] MEDICARE CHOICE PPO/PFFS / Product Type: Managed Care Medicare /  
  
NAME/AGE/GENDER: Donato Winchester is a 78 y.o. male PRIMARY DIAGNOSIS:  Syncope [R55] Weakness generalized [R53.1] Syncope Syncope ICD-10: Treatment Diagnosis:  
 Generalized Muscle Weakness (M62.81) Other lack of cordination (R27.8) Difficulty in walking, Not elsewhere classified (R26.2) Precautions/Allergies: 
  Falls, Fosinopril and Lisinopril ASSESSMENT:  
Mr. Savanna King is 79 YO R dominant AAM admitted with above s/p fall after being home for less than 1 day. DCd from here 4/3, still very weak. Pt presents supine in bed and agreeable for OT assessment. He endorses pain on his left side and B arms during fall at home. No skin tears noted. Pt supine to sit with CGA to min A with additional time and cueing. Able to prop sit. Stood with min A to RW while OT helped change his saturated brief. Pt only able to stand for about 30 seconds then had to sit back to edge of bed. Stood 4 times before we got pt fully cleaned and brief on. Pt took steps to head of bed with RW and Min A. Sat suddenly with quick descent. Pt returned to supine with min A and additional time. Made comfortable and all needs in reach. B UEs are WFLs for basic self care tasks though B shoulders are weak. Mr. Issa Macdonald would benefit from skilled occupational therapy (medically necessary) to address his deficits and maximize his rehab function and potential.  
 
This section established at most recent assessment PROBLEM LIST (Impairments causing functional limitations): 
Decreased Strength Decreased ADL/Functional Activities Decreased Transfer Abilities Decreased Ambulation Ability/Technique Decreased Balance Decreased Activity Tolerance Increased Fatigue Decreased Flexibility/Joint Mobility Decreased Skin Integrity/Hygeine INTERVENTIONS PLANNED: (Benefits and precautions of occupational therapy have been discussed with the patient.) Activities of daily living training Adaptive equipment training Balance training Clothing management Therapeutic activity Therapeutic exercise TREATMENT PLAN: Frequency/Duration: Follow patient 3x per week to address above goals. Rehabilitation Potential For Stated Goals: Good RECOMMENDED REHABILITATION/EQUIPMENT: (at time of discharge pending progress): Due to the probability of continued deficits (see above) this patient will likely need continued skilled occupational therapy after discharge. Equipment: TBD based on progress OCCUPATIONAL PROFILE AND HISTORY:  
History of Present Injury/Illness (Reason for Referral): 
See H&P Past Medical History/Comorbidities:  
Mr. Issa Macdonald  has a past medical history of Abdominal complaints (12/18/2013), Arthritis, Asthma, Atherosclerosis of native artery of extremity with ulceration ([facility] [address] (11/29/2017), Benign nodular prostatic hyperplasia without lower urinary tract symptoms (5/25/2017), BPH (benign prostatic hyperplasia), CAD (coronary artery disease), CAD (coronary artery disease) (1/12/2012), Chronic pain, COPD, Coronary artery disease (4/14/2015), Depression (emotion) (12/18/2013), Diabetes ([facility] [address] GERD (gastroesophageal reflux disease), History of CVA (cerebrovascular accident) (9/14/2016), Hyperlipidemia (5/2/2014), Hypertension, Lumbar spondylosis (8/8/2014), Moderate major depression ([address] (8/10/2018), Neuropathy, Neuropathy, Other ill-defined conditions(799.89), PAD (peripheral artery disease) ([address] (11/29/2017), Post traumatic stress disorder (1/12/2012), PTSD (post-traumatic stress disorder), S/P placement of cardiac pacemaker (2/3/2019), Seizures ([facility] [address] Spinal stenosis (8/8/2014), Stroke ([facility] [address] Thrombocytopenia, unspecified ([facility] [address] (1/12/2012), and Thyroid disease. Mr. Halle Maher  has a past surgical history that includes hx heent (2010); hx other surgical (1967/68); hx orthopaedic; bone marrow aspirate &biopsy (1/19/2012); pr cardiac surg procedure unlist; hx heart catheterization (4/23/2015); hx back surgery; and pr neurological procedure unlisted. Social History/Living Environment:  
Home Environment: Private residence # Steps to Enter: 10 
Rails to Enter: Yes Hand Rails : Bilateral 
Wheelchair Ramp: No 
One/Two Story Residence: Two story, live on 1st floor Lift Chair Available: No 
Living Alone: No 
Support Systems: Spouse/Significant Other/Partner, Family member(s) Patient Expects to be Discharged to[de-identified] Private residence Current DME Used/Available at Home: Sharran Rump, straight, Walker, rolling, Wheelchair Tub or Shower Type: Tub/Shower combination Prior Level of Function/Work/Activity: 
Per pt, reports lives with wife in 2 level home, bed on main level. 10 steps into home with B HRs. Was requiring more and more help from wife for self care, used RW all the time. No longer driving. Admits to 5 falls at home in the last 6 months. Needed more help with transfers as well. Wife reports she can't care for him at home any longer. Dominant Side:  
      RIGHT Number of Personal Factors/Comorbidities that affect the Plan of Care: Expanded review of therapy/medical records (1-2):  MODERATE COMPLEXITY ASSESSMENT OF OCCUPATIONAL PERFORMANCE[de-identified]  
Activities of Daily Living:  
Basic ADLs (From Assessment) Complex ADLs (From Assessment) Feeding: Setup Oral Facial Hygiene/Grooming: Moderate assistance Bathing: Moderate assistance Upper Body Dressing: Moderate assistance Lower Body Dressing: Maximum assistance Toileting: Total assistance, Adaptive equipment(adult briefs) Instrumental ADL Meal Preparation: Total assistance Homemaking: Total assistance Medication Management: Total assistance Financial Management: Total assistance Grooming/Bathing/Dressing Activities of Daily Living Cognitive Retraining Safety/Judgement: Fall prevention Functional Transfers Bathroom Mobility: Moderate assistance Toilet Transfer : Moderate assistance Tub Transfer: Maximum assistance Shower Transfer: Moderate assistance Bed/Mat Mobility Rolling: Minimum assistance Supine to Sit: Minimum assistance; Additional time Sit to Supine: Minimum assistance; Additional time Sit to Stand: Minimum assistance; Additional time Stand to Sit: Minimum assistance; Additional time Bed to Chair: Minimum assistance; Additional time Scooting: Minimum assistance; Additional time Most Recent Physical Functioning:  
Gross Assessment: 
AROM: Generally decreased, functional 
Strength: Generally decreased, functional 
Coordination: Generally decreased, functional 
         
  
Posture: 
Posture (WDL): Exceptions to [facility] Posture Assessment: Forward head, Rounded shoulders Balance: 
Sitting: Impaired Sitting - Static: Good (unsupported) Sitting - Dynamic: Fair (occasional) Standing: Impaired;Pull to stand; With support Standing - Static: Constant support;Poor Standing - Dynamic : Constant support;Poor Bed Mobility: 
Rolling: Minimum assistance Supine to Sit: Minimum assistance; Additional time Sit to Supine: Minimum assistance; Additional time Scooting: Minimum assistance; Additional time Wheelchair Mobility: 
  
Transfers: 
Sit to Stand: Minimum assistance; Additional time Stand to Sit: Minimum assistance; Additional time Bed to Chair: Minimum assistance; Additional time Patient Vitals for the past 6 hrs: 
 BP BP Patient Position SpO2 Pulse 04/06/19 0850 157/76 At rest 96 % 65  
04/06/19 1301 160/69  92 % 66 Mental Status Neurologic State: Alert Orientation Level: Oriented X4 Cognition: Follows commands Perception: Appears intact Perseveration: No perseveration noted Safety/Judgement: Fall prevention Physical Skills Involved: 
Range of Motion Balance Strength Activity Tolerance Cognitive Skills Affected (resulting in the inability to perform in a timely and safe manner): 
Perception Executive Function Sustained Attention Divided Attention Psychosocial Skills Affected: 
Habits/Routines Environmental Adaptation Social Interaction Emotional Regulation Self-Awareness Awareness of Others Social Roles Number of elements that affect the Plan of Care: 3-5:  MODERATE COMPLEXITY CLINICAL DECISION MAKING:  
[facility] AM-PAC? ?6 Clicks? Daily Activity Inpatient Short Form How much help from another person does the patient currently need. .. Total A Lot A Little None 1. Putting on and taking off regular lower body clothing? ? 1   ? 2   ? 3   ? 4  
2. Bathing (including washing, rinsing, drying)? ? 1   ? 2   ? 3   ? 4  
3. Toileting, which includes using toilet, bedpan or urinal?   ? 1   ? 2   ? 3   ? 4  
4. Putting on and taking off regular upper body clothing? ? 1   ? 2   ? 3   ? 4  
5. Taking care of personal grooming such as brushing teeth? ? 1   ? 2   ? 3   ? 4  
6. Eating meals? ? 1   ? 2   ? 3   ? 4  
© 2007, Trustees of [facility], under license to [employer]. All rights reserved Score:  Initial: 14, completed 4/6/2019 Most Recent: X (Date: -- ) Interpretation of Tool:  Represents activities that are increasingly more difficult (i.e. Bed mobility, Transfers, Gait). Medical Necessity:    
Patient demonstrates good 
 rehab potential due to higher previous functional level. Reason for Services/Other Comments: 
Patient continues to require skilled intervention due to s/p above and decreased ADLs and mobility Rubia Purcell Use of outcome tool(s) and clinical judgement create a POC that gives a: MODERATE COMPLEXITY  
 
 
 
TREATMENT:  
(In addition to Assessment/Re-Assessment sessions the following treatments were rendered) Pre-treatment Symptoms/Complaints:  \"I went down on both knees and my elbows. \" 
Pain: Initial:  
Pain Intensity 1: 0  Post Session:  no complaint of pain Self Care: (20 mins): Procedure(s) (per grid) utilized to improve and/or restore self-care/home management as related to bathing, toileting, grooming and self feeding. Required maximal visual, verbal, manual, tactile, physical assist and   cueing to facilitate activities of daily living skills and compensatory activities. Evaluation: 10 mins Braces/Orthotics/Lines/Etc:  
IV Adult brief O2 Device: Room air Treatment/Session Assessment:   
Response to Treatment:  no adverse reaction Interdisciplinary Collaboration: Occupational Therapist 
Registered Nurse Certified Nursing Assistant/Patient Care Technician After treatment position/precautions:  
Supine in bed Bed alarm/tab alert on Bed/Chair-wheels locked Bed in low position Call light within reach RN notified Side rails x 3 Compliance with Program/Exercises: Compliant most of the time. Recommendations/Intent for next treatment session: \"Next visit will focus on advancements to more challenging activities and reduction in assistance provided\". Total Treatment Duration:  30 mins OT Patient Time In/Time Out Time In: 1350 Time Out: 9659 Dane Perez OT   Dane Perez, MS, OTR/L

## 2019-04-06 NOTE — PROGRESS NOTES
Bedside and Verbal shift change report given to self, RN (oncoming nurse) by Marcina Cranker, RN (offgoing nurse). Report included the following information SBAR, Kardex, Intake/Output, MAR and Recent Results.

## 2019-04-06 NOTE — PROGRESS NOTES
Verbal bedside report received from Lady Shah, [facility]. Assumed care of patient.

## 2019-04-06 NOTE — PROGRESS NOTES
Bedside and Verbal shift change report given to ALON Hawkins (oncoming nurse) by self (offgoing nurse). Report included the following information SBAR, Kardex, Intake/Output, MAR, Recent Results and Med Rec Status.

## 2019-04-06 NOTE — PROGRESS NOTES
Bedside and Verbal shift change report given to [address] [place] [address] Nhi RN (oncoming nurse) by self (offgoing nurse). Report included the following information SBAR, Kardex, MAR and Recent Results.

## 2019-04-06 NOTE — PROGRESS NOTES
Problem: Falls - Risk of 
Goal: *Absence of Falls Description Document Izzy Stout Fall Risk and appropriate interventions in the flowsheet. Outcome: Progressing Towards Goal 
Note:  
Fall Risk Interventions: 
Mobility Interventions: Bed/chair exit alarm, Patient to call before getting OOB Medication Interventions: Patient to call before getting OOB, Teach patient to arise slowly, Bed/chair exit alarm Elimination Interventions: Call light in reach, Urinal in reach, Bed/chair exit alarm History of Falls Interventions: Bed/chair exit alarm, Investigate reason for fall Problem: Pressure Injury - Risk of 
Goal: *Prevention of pressure injury Description Document Hugh Scale and appropriate interventions in the flowsheet.  
Outcome: Progressing Towards Goal

## 2019-04-07 LAB
ANION GAP SERPL CALC-SCNC: 6 MMOL/L (ref 7–16)
BASOPHILS # BLD: 0 K/UL (ref 0–0.2)
BASOPHILS NFR BLD: 0 % (ref 0–2)
BUN SERPL-MCNC: 7 MG/DL (ref 8–23)
CALCIUM SERPL-MCNC: 8.7 MG/DL (ref 8.3–10.4)
CHLORIDE SERPL-SCNC: 104 MMOL/L (ref 98–107)
CO2 SERPL-SCNC: 27 MMOL/L (ref 21–32)
CREAT SERPL-MCNC: 0.9 MG/DL (ref 0.8–1.5)
DIFFERENTIAL METHOD BLD: ABNORMAL
EOSINOPHIL # BLD: 0.1 K/UL (ref 0–0.8)
EOSINOPHIL NFR BLD: 4 % (ref 0.5–7.8)
ERYTHROCYTE [DISTWIDTH] IN BLOOD BY AUTOMATED COUNT: 14 % (ref 11.9–14.6)
GLUCOSE BLD STRIP.AUTO-MCNC: 128 MG/DL (ref 65–100)
GLUCOSE BLD STRIP.AUTO-MCNC: 130 MG/DL (ref 65–100)
GLUCOSE BLD STRIP.AUTO-MCNC: 130 MG/DL (ref 65–100)
GLUCOSE BLD STRIP.AUTO-MCNC: 160 MG/DL (ref 65–100)
GLUCOSE SERPL-MCNC: 134 MG/DL (ref 65–100)
HCT VFR BLD AUTO: 39.8 % (ref 41.1–50.3)
HGB BLD-MCNC: 13.1 G/DL (ref 13.6–17.2)
IMM GRANULOCYTES # BLD AUTO: 0 K/UL (ref 0–0.5)
IMM GRANULOCYTES NFR BLD AUTO: 0 % (ref 0–5)
LYMPHOCYTES # BLD: 1.2 K/UL (ref 0.5–4.6)
LYMPHOCYTES NFR BLD: 30 % (ref 13–44)
MCH RBC QN AUTO: 30.2 PG (ref 26.1–32.9)
MCHC RBC AUTO-ENTMCNC: 32.9 G/DL (ref 31.4–35)
MCV RBC AUTO: 91.7 FL (ref 79.6–97.8)
MONOCYTES # BLD: 0.6 K/UL (ref 0.1–1.3)
MONOCYTES NFR BLD: 16 % (ref 4–12)
NEUTS SEG # BLD: 1.9 K/UL (ref 1.7–8.2)
NEUTS SEG NFR BLD: 50 % (ref 43–78)
NRBC # BLD: 0 K/UL (ref 0–0.2)
PLATELET # BLD AUTO: 154 K/UL (ref 150–450)
PMV BLD AUTO: 11.5 FL (ref 9.4–12.3)
POTASSIUM SERPL-SCNC: 3.4 MMOL/L (ref 3.5–5.1)
RBC # BLD AUTO: 4.34 M/UL (ref 4.23–5.6)
SODIUM SERPL-SCNC: 137 MMOL/L (ref 136–145)
WBC # BLD AUTO: 3.8 K/UL (ref 4.3–11.1)

## 2019-04-07 PROCEDURE — 3331090002 HH PPS REVENUE DEBIT

## 2019-04-07 PROCEDURE — 82962 GLUCOSE BLOOD TEST: CPT

## 2019-04-07 PROCEDURE — 36415 COLL VENOUS BLD VENIPUNCTURE: CPT

## 2019-04-07 PROCEDURE — 74011250636 HC RX REV CODE- 250/636: Performed by: NURSE PRACTITIONER

## 2019-04-07 PROCEDURE — 85025 COMPLETE CBC W/AUTO DIFF WBC: CPT

## 2019-04-07 PROCEDURE — 74011250637 HC RX REV CODE- 250/637: Performed by: FAMILY MEDICINE

## 2019-04-07 PROCEDURE — 80048 BASIC METABOLIC PNL TOTAL CA: CPT

## 2019-04-07 PROCEDURE — 99218 HC RM OBSERVATION: CPT

## 2019-04-07 PROCEDURE — 74011250637 HC RX REV CODE- 250/637: Performed by: NURSE PRACTITIONER

## 2019-04-07 PROCEDURE — 74011000258 HC RX REV CODE- 258: Performed by: NURSE PRACTITIONER

## 2019-04-07 PROCEDURE — 96366 THER/PROPH/DIAG IV INF ADDON: CPT

## 2019-04-07 PROCEDURE — 74011636637 HC RX REV CODE- 636/637: Performed by: NURSE PRACTITIONER

## 2019-04-07 PROCEDURE — 3331090001 HH PPS REVENUE CREDIT

## 2019-04-07 RX ORDER — POLYETHYLENE GLYCOL 3350 17 G/17G
17 POWDER, FOR SOLUTION ORAL DAILY
Status: DISCONTINUED | OUTPATIENT
Start: 2019-04-07 | End: 2019-04-10 | Stop reason: HOSPADM

## 2019-04-07 RX ORDER — INSULIN LISPRO 100 [IU]/ML
INJECTION, SOLUTION INTRAVENOUS; SUBCUTANEOUS
Status: DISCONTINUED | OUTPATIENT
Start: 2019-04-07 | End: 2019-04-07

## 2019-04-07 RX ORDER — INSULIN LISPRO 100 [IU]/ML
INJECTION, SOLUTION INTRAVENOUS; SUBCUTANEOUS
Status: DISCONTINUED | OUTPATIENT
Start: 2019-04-07 | End: 2019-04-10 | Stop reason: HOSPADM

## 2019-04-07 RX ADMIN — HYDRALAZINE HYDROCHLORIDE 50 MG: 50 TABLET, FILM COATED ORAL at 17:03

## 2019-04-07 RX ADMIN — POTASSIUM CHLORIDE 20 MEQ: 20 TABLET, EXTENDED RELEASE ORAL at 09:09

## 2019-04-07 RX ADMIN — BISACODYL 5 MG: 5 TABLET, COATED ORAL at 09:15

## 2019-04-07 RX ADMIN — PANTOPRAZOLE SODIUM 40 MG: 40 TABLET, DELAYED RELEASE ORAL at 09:09

## 2019-04-07 RX ADMIN — CEFTRIAXONE SODIUM 1 G: 1 INJECTION, POWDER, FOR SOLUTION INTRAMUSCULAR; INTRAVENOUS at 14:25

## 2019-04-07 RX ADMIN — FUROSEMIDE 20 MG: 20 TABLET ORAL at 09:23

## 2019-04-07 RX ADMIN — PRAZOSIN HYDROCHLORIDE 1 MG: 1 CAPSULE ORAL at 21:06

## 2019-04-07 RX ADMIN — GABAPENTIN 100 MG: 100 CAPSULE ORAL at 21:06

## 2019-04-07 RX ADMIN — OXYCODONE HYDROCHLORIDE AND ACETAMINOPHEN 1 TABLET: 10; 325 TABLET ORAL at 09:16

## 2019-04-07 RX ADMIN — LEVOTHYROXINE SODIUM 50 MCG: 50 TABLET ORAL at 09:09

## 2019-04-07 RX ADMIN — TAMSULOSIN HYDROCHLORIDE 0.4 MG: 0.4 CAPSULE ORAL at 09:10

## 2019-04-07 RX ADMIN — ESCITALOPRAM OXALATE 20 MG: 10 TABLET ORAL at 09:09

## 2019-04-07 RX ADMIN — HYDRALAZINE HYDROCHLORIDE 50 MG: 50 TABLET, FILM COATED ORAL at 21:06

## 2019-04-07 RX ADMIN — INSULIN LISPRO 2 UNITS: 100 INJECTION, SOLUTION INTRAVENOUS; SUBCUTANEOUS at 12:25

## 2019-04-07 RX ADMIN — METOPROLOL SUCCINATE 50 MG: 50 TABLET, EXTENDED RELEASE ORAL at 09:09

## 2019-04-07 RX ADMIN — Medication 5 ML: at 14:25

## 2019-04-07 RX ADMIN — CLOPIDOGREL BISULFATE 75 MG: 75 TABLET ORAL at 09:10

## 2019-04-07 RX ADMIN — GABAPENTIN 100 MG: 100 CAPSULE ORAL at 17:02

## 2019-04-07 RX ADMIN — HYDRALAZINE HYDROCHLORIDE 50 MG: 50 TABLET, FILM COATED ORAL at 09:10

## 2019-04-07 RX ADMIN — Medication 5 ML: at 21:07

## 2019-04-07 RX ADMIN — POLYETHYLENE GLYCOL 3350 17 G: 17 POWDER, FOR SOLUTION ORAL at 11:22

## 2019-04-07 RX ADMIN — RISPERIDONE 1 MG: 1 TABLET ORAL at 09:09

## 2019-04-07 RX ADMIN — Medication 5 ML: at 06:05

## 2019-04-07 RX ADMIN — GABAPENTIN 100 MG: 100 CAPSULE ORAL at 09:09

## 2019-04-07 NOTE — PROGRESS NOTES
Hospitalist Progress Note Admit Date:  2019  4:31 PM  
Name:  Laura Caldera Age:  78 y.o. 
:  1950 MRN:  [id number] PCP:  Elaina Kaplan MD 
Treatment Team: Attending Provider: Bonifacio Canela MD; Utilization Review: Jeanne Llamas RN; Care Manager: Melany Tobar RN Subjective: PHI - Patient is a 77 yo male who was discharged the day before admission  from [facility] for weakness, who presented to the ER with his wife who stated she cannot care for him at home. She reports he fell hurting both knees and left hip and refusing to walk. ER workup with xrays do not show any significant injuries. Patient with UTI with 4+ bacteria started on rocephin . Urine cx with GNR. Patient alert and oriented. Flat affect. Denies CP, SOB, fever/chills. Reports back pain from sitting in bed. Objective:  
 
Patient Vitals for the past 24 hrs: 
 Temp Pulse Resp BP SpO2  
19 0810 97.8 °F (36.6 °C) 67 16 167/85 93 % 19 0520 98.5 °F (36.9 °C) 64 18 169/78 96 % 19 0130 (!) 100.5 °F (38.1 °C) 67 18 135/75 95 % 19 2330    130/63   
19 2217    (!) 172/92   
19 2019 98.5 °F (36.9 °C) 63 17 163/75 96 % 19 1634 99 °F (37.2 °C) 72 16 172/72 94 % 19 1301 98.2 °F (36.8 °C) 66 16 160/69 92 % Oxygen Therapy O2 Sat (%): 93 % (19 0810) Pulse via Oximetry: 73 beats per minute (19 2146) O2 Device: Room air (19 0720) Intake/Output Summary (Last 24 hours) at 2019 1033 Last data filed at 2019 2030 Gross per 24 hour Intake 476 ml Output 360 ml Net 116 ml General:    Well nourished. Alert. Flat affect CV:   RRR. No murmur, rub, or gallop. Lungs:   CTAB. No wheezing, rhonchi, or rales. Room air. Abdomen:   Soft, nontender, nondistended. Bowel sounds present Extremities: Warm and dry. No cyanosis or edema. Weakness in all extremities. Skin:     No rashes or jaundice. Data Review: 
I have reviewed all labs, meds, telemetry events, and studies from the last 24 hours. Recent Results (from the past 24 hour(s)) GLUCOSE, POC Collection Time: 04/06/19 11:17 AM  
Result Value Ref Range Glucose (POC) 144 (H) 65 - 100 mg/dL GLUCOSE, POC Collection Time: 04/06/19  4:15 PM  
Result Value Ref Range Glucose (POC) 120 (H) 65 - 100 mg/dL GLUCOSE, POC Collection Time: 04/06/19 10:14 PM  
Result Value Ref Range Glucose (POC) 137 (H) 65 - 100 mg/dL PLEASE READ & DOCUMENT PPD TEST IN 48 HRS Collection Time: 04/06/19 11:49 PM  
Result Value Ref Range PPD Negative Negative  
 mm Induration 0 0 - 5 mm GLUCOSE, POC Collection Time: 04/07/19  5:55 AM  
Result Value Ref Range Glucose (POC) 128 (H) 65 - 100 mg/dL CBC WITH AUTOMATED DIFF Collection Time: 04/07/19  7:47 AM  
Result Value Ref Range WBC 3.8 (L) 4.3 - 11.1 K/uL  
 RBC 4.34 4.23 - 5.6 M/uL  
 HGB 13.1 (L) 13.6 - 17.2 g/dL HCT 39.8 (L) 41.1 - 50.3 % MCV 91.7 79.6 - 97.8 FL  
 MCH 30.2 26.1 - 32.9 PG  
 MCHC 32.9 31.4 - 35.0 g/dL  
 RDW 14.0 11.9 - 14.6 % PLATELET 845 639 - 892 K/uL MPV 11.5 9.4 - 12.3 FL ABSOLUTE NRBC 0.00 0.0 - 0.2 K/uL  
 DF AUTOMATED NEUTROPHILS 50 43 - 78 % LYMPHOCYTES 30 13 - 44 % MONOCYTES 16 (H) 4.0 - 12.0 % EOSINOPHILS 4 0.5 - 7.8 % BASOPHILS 0 0.0 - 2.0 % IMMATURE GRANULOCYTES 0 0.0 - 5.0 %  
 ABS. NEUTROPHILS 1.9 1.7 - 8.2 K/UL  
 ABS. LYMPHOCYTES 1.2 0.5 - 4.6 K/UL  
 ABS. MONOCYTES 0.6 0.1 - 1.3 K/UL  
 ABS. EOSINOPHILS 0.1 0.0 - 0.8 K/UL  
 ABS. BASOPHILS 0.0 0.0 - 0.2 K/UL  
 ABS. IMM. GRANS. 0.0 0.0 - 0.5 K/UL METABOLIC PANEL, BASIC Collection Time: 04/07/19  7:47 AM  
Result Value Ref Range Sodium 137 136 - 145 mmol/L Potassium 3.4 (L) 3.5 - 5.1 mmol/L Chloride 104 98 - 107 mmol/L  
 CO2 27 21 - 32 mmol/L Anion gap 6 (L) 7 - 16 mmol/L Glucose 134 (H) 65 - 100 mg/dL  BUN 7 (L) 8 - 23 MG/DL  
 Creatinine 0.90 0.8 - 1.5 MG/DL  
 GFR est AA >60 >60 ml/min/1.73m2 GFR est non-AA >60 >60 ml/min/1.73m2 Calcium 8.7 8.3 - 10.4 MG/DL All Micro Results Procedure Component Value Units Date/Time CULTURE, URINE [[id number]]  (Abnormal) Collected:  04/05/19 1343 Order Status:  Completed Specimen:  Cath Urine Updated:  04/07/19 9039 Special Requests: NO SPECIAL REQUESTS Culture result:    
  >100,000 COLONIES/mL GRAM NEGATIVE RODS  
     
   SUBCULTURE IN PROGRESS     
 CULTURE, URINE [[id number]] Order Status:  Canceled Specimen:  Urine from Clean catch Current Meds: 
Current Facility-Administered Medications Medication Dose Route Frequency  polyethylene glycol (MIRALAX) packet 17 g  17 g Oral DAILY  lip protectant (BLISTEX) ointment   Topical PRN  
 cefTRIAXone (ROCEPHIN) 1 g in 0.9% sodium chloride (MBP/ADV) 50 mL  1 g IntraVENous Q24H  
 [Held by provider] amiodarone (CORDARONE) tablet 100 mg  100 mg Oral DAILY  amLODIPine (NORVASC) tablet 10 mg  10 mg Oral DAILY  clopidogrel (PLAVIX) tablet 75 mg  75 mg Oral DAILY AFTER BREAKFAST  escitalopram oxalate (LEXAPRO) tablet 20 mg  20 mg Oral DAILY  furosemide (LASIX) tablet 20 mg  20 mg Oral DAILY  gabapentin (NEURONTIN) capsule 100 mg  100 mg Oral TID  hydrALAZINE (APRESOLINE) tablet 50 mg  50 mg Oral TID  [Held by provider] isosorbide mononitrate ER (IMDUR) tablet 30 mg  30 mg Oral DAILY  levothyroxine (SYNTHROID) tablet 50 mcg  50 mcg Oral ACB  [Held by provider] losartan (COZAAR) tablet 100 mg  100 mg Oral DAILY  metoprolol succinate (TOPROL-XL) XL tablet 50 mg  50 mg Oral DAILY  morphine IR (MS IR) tablet 15 mg  15 mg Oral Q4H PRN  pantoprazole (PROTONIX) tablet 40 mg  40 mg Oral DAILY  potassium chloride (K-DUR, KLOR-CON) SR tablet 20 mEq  20 mEq Oral DAILY  prazosin (MINIPRESS) capsule 1 mg  1 mg Oral QHS  risperiDONE (RisperDAL) tablet 1 mg  1 mg Oral DAILY  tamsulosin (FLOMAX) capsule 0.4 mg  0.4 mg Oral DAILY  sodium chloride (NS) flush 5-40 mL  5-40 mL IntraVENous Q8H  
 sodium chloride (NS) flush 5-40 mL  5-40 mL IntraVENous PRN  
 acetaminophen (TYLENOL) tablet 650 mg  650 mg Oral Q4H PRN  
 naloxone (NARCAN) injection 0.4 mg  0.4 mg IntraVENous PRN  
 diphenhydrAMINE (BENADRYL) capsule 25 mg  25 mg Oral Q6H PRN  
 ondansetron (ZOFRAN) injection 4 mg  4 mg IntraVENous Q4H PRN  
 bisacodyl (DULCOLAX) tablet 5 mg  5 mg Oral DAILY PRN  
 LORazepam (ATIVAN) tablet 1 mg  1 mg Oral BID PRN  
 oxyCODONE-acetaminophen (PERCOCET 10)  mg per tablet 1 Tab  1 Tab Oral Q4H PRN Other Studies (last 24 hours): No results found. Assessment and Plan:  
 
Hospital Problems as of 4/7/2019 Date Reviewed: 3/14/2019 Codes Class Noted - Resolved POA Weakness generalized ICD-10-CM: R53.1 ICD-9-CM: 780.79  4/4/2019 - Present Unknown Atrial fibrillation (HCC) (Chronic) ICD-10-CM: I48.91 
ICD-9-CM: 427.31  3/14/2019 - Present Yes Type 2 diabetes with nephropathy (HCC) (Chronic) ICD-10-CM: E11.21 
ICD-9-CM: 250.40, 583.81  11/6/2018 - Present Yes * (Principal) Syncope ICD-10-CM: R55 
ICD-9-CM: 780.2  6/29/2016 - Present Yes Overview Signed 6/29/2016 10:48 PM by Migdalia Mcclain. Questionable Type 2 diabetes mellitus with diabetic polyneuropathy (HCC) (Chronic) ICD-10-CM: E11.42 
ICD-9-CM: 250.60, 357.2  7/13/2015 - Present Yes Chronic pain syndrome (Chronic) ICD-10-CM: G89.4 ICD-9-CM: 338.4  8/8/2014 - Present Yes Dementia (Chronic) ICD-10-CM: F03.90 ICD-9-CM: 294.20  5/2/2014 - Present Yes Hypertension (Chronic) ICD-10-CM: I10 
ICD-9-CM: 401.9  1/12/2012 - Present Yes PLAN:   
Principal Problem: 
 
Weakness likely d/t UTI  
 -PT/OT/ppd/cm 
-Check daily labs UTI 
-rocephin started 4/5 
-Urine cx with GNR - continue rocephin and follow culture. -prn pain/fever - tmax 100.5 
  
Active Problems: Hypertension (1/12/2012) patient hypotensive after administration of meds 4/5 Patient on many blood pressure medications verified from recent cardiology office visit. Will adjust dosages and monitor.  
  
  Dementia (5/2/2014) Chronic condition is stable, Chronic pain syndrome (8/8/2014) Chronic condition is stable  Type 2 diabetes mellitus with diabetic polyneuropathy ([facility] [address] (7/13/2015) Type 2 diabetes with nephropathy ([facility] [address] (11/6/2018) -SSI 
  
  Atrial fibrillation ([facility] [address] (3/14/2019) Chronic condition is stable May need to Re-consider anti-coagulation prior to d/c due to falls. eliquis held. Plavix continues.  
  
 Hypothyroidism TSH 6.78 - dose recently increased at physician visit. DVT prophylaxis:  scd's Code status: Full;  HCPOA: on file 
  
Plan of care discussed with Dr. Franky Gallo 
 
Signed: 
HEIDI Leger

## 2019-04-07 NOTE — PROGRESS NOTES
Bedside and Verbal shift change report given to [facility] (oncoming nurse) by self (offgoing nurse). Report included the following information SBAR, Kardex, Intake/Output, MAR, Recent Results and Med Rec Status.

## 2019-04-07 NOTE — PROGRESS NOTES
Bedside and Verbal shift change report given to Memo Ariza RN (oncoming nurse) by self, RN (offgoing nurse). Report included the following information SBAR, Kardex, Intake/Output, MAR and Recent Results.

## 2019-04-07 NOTE — PROGRESS NOTES
Problem: Pressure Injury - Risk of 
Goal: *Prevention of pressure injury Description Document Hugh Scale and appropriate interventions in the flowsheet. Allevyn present to prevent skin breakdown.   
Outcome: Progressing Towards Goal

## 2019-04-07 NOTE — PROGRESS NOTES
Problem: Falls - Risk of 
Goal: *Absence of Falls Description Document Redgie Curet Fall Risk and appropriate interventions in the flowsheet. Bed locked and in lowest position. Side rails x2 up. Bed alarm on and activated. Call light within reach of patient. Patient verbalizes understanding to call for help before getting out of bed.   
Outcome: Progressing Towards Goal

## 2019-04-08 LAB
ANION GAP SERPL CALC-SCNC: 7 MMOL/L (ref 7–16)
BASOPHILS # BLD: 0 K/UL (ref 0–0.2)
BASOPHILS NFR BLD: 1 % (ref 0–2)
BUN SERPL-MCNC: 9 MG/DL (ref 8–23)
CALCIUM SERPL-MCNC: 8.5 MG/DL (ref 8.3–10.4)
CHLORIDE SERPL-SCNC: 109 MMOL/L (ref 98–107)
CO2 SERPL-SCNC: 26 MMOL/L (ref 21–32)
CREAT SERPL-MCNC: 0.95 MG/DL (ref 0.8–1.5)
DIFFERENTIAL METHOD BLD: ABNORMAL
EOSINOPHIL # BLD: 0.2 K/UL (ref 0–0.8)
EOSINOPHIL NFR BLD: 4 % (ref 0.5–7.8)
ERYTHROCYTE [DISTWIDTH] IN BLOOD BY AUTOMATED COUNT: 14.1 % (ref 11.9–14.6)
GLUCOSE BLD STRIP.AUTO-MCNC: 125 MG/DL (ref 65–100)
GLUCOSE BLD STRIP.AUTO-MCNC: 134 MG/DL (ref 65–100)
GLUCOSE BLD STRIP.AUTO-MCNC: 184 MG/DL (ref 65–100)
GLUCOSE BLD STRIP.AUTO-MCNC: 187 MG/DL (ref 65–100)
GLUCOSE SERPL-MCNC: 121 MG/DL (ref 65–100)
HCT VFR BLD AUTO: 36.8 % (ref 41.1–50.3)
HGB BLD-MCNC: 12.1 G/DL (ref 13.6–17.2)
IMM GRANULOCYTES # BLD AUTO: 0 K/UL (ref 0–0.5)
IMM GRANULOCYTES NFR BLD AUTO: 0 % (ref 0–5)
LYMPHOCYTES # BLD: 1.4 K/UL (ref 0.5–4.6)
LYMPHOCYTES NFR BLD: 29 % (ref 13–44)
MCH RBC QN AUTO: 30.1 PG (ref 26.1–32.9)
MCHC RBC AUTO-ENTMCNC: 32.9 G/DL (ref 31.4–35)
MCV RBC AUTO: 91.5 FL (ref 79.6–97.8)
MM INDURATION POC: 0 MM (ref 0–5)
MONOCYTES # BLD: 0.7 K/UL (ref 0.1–1.3)
MONOCYTES NFR BLD: 14 % (ref 4–12)
NEUTS SEG # BLD: 2.5 K/UL (ref 1.7–8.2)
NEUTS SEG NFR BLD: 52 % (ref 43–78)
NRBC # BLD: 0 K/UL (ref 0–0.2)
PLATELET # BLD AUTO: 152 K/UL (ref 150–450)
PMV BLD AUTO: 11.3 FL (ref 9.4–12.3)
POTASSIUM SERPL-SCNC: 3.5 MMOL/L (ref 3.5–5.1)
PPD POC: NEGATIVE NEGATIVE
RBC # BLD AUTO: 4.02 M/UL (ref 4.23–5.6)
SODIUM SERPL-SCNC: 142 MMOL/L (ref 136–145)
WBC # BLD AUTO: 4.9 K/UL (ref 4.3–11.1)

## 2019-04-08 PROCEDURE — 97530 THERAPEUTIC ACTIVITIES: CPT

## 2019-04-08 PROCEDURE — 99218 HC RM OBSERVATION: CPT

## 2019-04-08 PROCEDURE — 85025 COMPLETE CBC W/AUTO DIFF WBC: CPT

## 2019-04-08 PROCEDURE — 74011636637 HC RX REV CODE- 636/637: Performed by: NURSE PRACTITIONER

## 2019-04-08 PROCEDURE — 96366 THER/PROPH/DIAG IV INF ADDON: CPT

## 2019-04-08 PROCEDURE — 74011000258 HC RX REV CODE- 258: Performed by: NURSE PRACTITIONER

## 2019-04-08 PROCEDURE — 82962 GLUCOSE BLOOD TEST: CPT

## 2019-04-08 PROCEDURE — 36415 COLL VENOUS BLD VENIPUNCTURE: CPT

## 2019-04-08 PROCEDURE — 77030020256 HC SOL INJ NACL 0.9%  500ML

## 2019-04-08 PROCEDURE — 96367 TX/PROPH/DG ADDL SEQ IV INF: CPT

## 2019-04-08 PROCEDURE — 74011250636 HC RX REV CODE- 250/636: Performed by: NURSE PRACTITIONER

## 2019-04-08 PROCEDURE — 3331090002 HH PPS REVENUE DEBIT

## 2019-04-08 PROCEDURE — 77030019605

## 2019-04-08 PROCEDURE — 80048 BASIC METABOLIC PNL TOTAL CA: CPT

## 2019-04-08 PROCEDURE — 87040 BLOOD CULTURE FOR BACTERIA: CPT

## 2019-04-08 PROCEDURE — 74011250637 HC RX REV CODE- 250/637: Performed by: FAMILY MEDICINE

## 2019-04-08 PROCEDURE — 3331090001 HH PPS REVENUE CREDIT

## 2019-04-08 RX ORDER — AMIODARONE HYDROCHLORIDE 100 MG/1
100 TABLET ORAL DAILY
COMMUNITY
End: 2019-04-10

## 2019-04-08 RX ORDER — POLYETHYLENE GLYCOL 3350 17 G/17G
17 POWDER, FOR SOLUTION ORAL AS NEEDED
COMMUNITY

## 2019-04-08 RX ORDER — AMLODIPINE BESYLATE 10 MG/1
10 TABLET ORAL DAILY
COMMUNITY

## 2019-04-08 RX ORDER — RISPERIDONE 0.5 MG/1
0.5 TABLET, FILM COATED ORAL DAILY
COMMUNITY

## 2019-04-08 RX ORDER — OXYBUTYNIN CHLORIDE 5 MG/1
5 TABLET ORAL
COMMUNITY

## 2019-04-08 RX ORDER — CLOPIDOGREL BISULFATE 75 MG/1
75 TABLET ORAL
COMMUNITY

## 2019-04-08 RX ORDER — ATORVASTATIN CALCIUM 80 MG/1
80 TABLET, FILM COATED ORAL
COMMUNITY

## 2019-04-08 RX ORDER — PRAZOSIN HYDROCHLORIDE 1 MG/1
1.5 CAPSULE ORAL
COMMUNITY

## 2019-04-08 RX ORDER — TAMSULOSIN HYDROCHLORIDE 0.4 MG/1
0.4 CAPSULE ORAL DAILY
COMMUNITY

## 2019-04-08 RX ADMIN — Medication 5 ML: at 22:36

## 2019-04-08 RX ADMIN — PANTOPRAZOLE SODIUM 40 MG: 40 TABLET, DELAYED RELEASE ORAL at 08:40

## 2019-04-08 RX ADMIN — Medication 10 ML: at 05:38

## 2019-04-08 RX ADMIN — POTASSIUM CHLORIDE 20 MEQ: 20 TABLET, EXTENDED RELEASE ORAL at 08:39

## 2019-04-08 RX ADMIN — RISPERIDONE 1 MG: 1 TABLET ORAL at 08:40

## 2019-04-08 RX ADMIN — PIPERACILLIN AND TAZOBACTAM 3.38 G: 3; .375 INJECTION, POWDER, FOR SOLUTION INTRAVENOUS at 17:40

## 2019-04-08 RX ADMIN — GABAPENTIN 100 MG: 100 CAPSULE ORAL at 22:35

## 2019-04-08 RX ADMIN — ESCITALOPRAM OXALATE 20 MG: 10 TABLET ORAL at 08:39

## 2019-04-08 RX ADMIN — PRAZOSIN HYDROCHLORIDE 1 MG: 1 CAPSULE ORAL at 22:35

## 2019-04-08 RX ADMIN — ACETAMINOPHEN 650 MG: 325 TABLET, FILM COATED ORAL at 15:10

## 2019-04-08 RX ADMIN — FUROSEMIDE 20 MG: 20 TABLET ORAL at 08:40

## 2019-04-08 RX ADMIN — METOPROLOL SUCCINATE 50 MG: 50 TABLET, EXTENDED RELEASE ORAL at 08:40

## 2019-04-08 RX ADMIN — GABAPENTIN 100 MG: 100 CAPSULE ORAL at 08:40

## 2019-04-08 RX ADMIN — GABAPENTIN 100 MG: 100 CAPSULE ORAL at 17:40

## 2019-04-08 RX ADMIN — INSULIN LISPRO 2 UNITS: 100 INJECTION, SOLUTION INTRAVENOUS; SUBCUTANEOUS at 12:11

## 2019-04-08 RX ADMIN — Medication 5 ML: at 17:53

## 2019-04-08 RX ADMIN — HYDRALAZINE HYDROCHLORIDE 50 MG: 50 TABLET, FILM COATED ORAL at 08:40

## 2019-04-08 RX ADMIN — LEVOTHYROXINE SODIUM 50 MCG: 50 TABLET ORAL at 08:39

## 2019-04-08 RX ADMIN — HYDRALAZINE HYDROCHLORIDE 50 MG: 50 TABLET, FILM COATED ORAL at 22:35

## 2019-04-08 RX ADMIN — TAMSULOSIN HYDROCHLORIDE 0.4 MG: 0.4 CAPSULE ORAL at 08:39

## 2019-04-08 RX ADMIN — INSULIN LISPRO 2 UNITS: 100 INJECTION, SOLUTION INTRAVENOUS; SUBCUTANEOUS at 22:36

## 2019-04-08 RX ADMIN — AMLODIPINE BESYLATE 10 MG: 10 TABLET ORAL at 08:39

## 2019-04-08 RX ADMIN — HYDRALAZINE HYDROCHLORIDE 50 MG: 50 TABLET, FILM COATED ORAL at 17:40

## 2019-04-08 RX ADMIN — CEFTRIAXONE SODIUM 1 G: 1 INJECTION, POWDER, FOR SOLUTION INTRAMUSCULAR; INTRAVENOUS at 15:10

## 2019-04-08 RX ADMIN — ACETAMINOPHEN 650 MG: 325 TABLET, FILM COATED ORAL at 22:36

## 2019-04-08 RX ADMIN — CLOPIDOGREL BISULFATE 75 MG: 75 TABLET ORAL at 08:40

## 2019-04-08 NOTE — PROGRESS NOTES
Hospitalist Progress Note Admit Date:  2019  4:31 PM  
Name:  Sharon Mendez Age:  78 y.o. 
:  1950 MRN:  [id number] PCP:  Jose Anderson MD 
Treatment Team: Attending Provider: Pao Piedra MD; Care Manager: Gina Landaverde RN; Utilization Review: Curtis Jones RN Subjective: PHI - Patient is a 77 yo male who was discharged the day before admission  from [facility] for weakness, who presented to the ER with his wife who stated she cannot care for him at home. She reports he fell hurting both knees and left hip and refusing to walk. ER workup with xrays do not show any significant injuries. Patient with UTI with 4+ bacteria started on rocephin . Urine cx with GNR. Patient resting quietly this morning. Afebrile overnight. Denied pain, SOB, cough, fever/chills. Objective:  
 
Patient Vitals for the past 24 hrs: 
 Temp Pulse Resp BP SpO2  
19 0839 98.5 °F (36.9 °C) 64 18 155/71 96 % 19 0504 97.7 °F (36.5 °C) 66 18 142/72 97 % 19 0045 97.4 °F (36.3 °C) 68 18 143/65 97 % 19 2045 98.7 °F (37.1 °C) 79 18 173/73 95 % 19 1551 97.6 °F (36.4 °C) 67 13 130/75 96 % 19 1206 99.4 °F (37.4 °C) 69 14 93/73 94 % Oxygen Therapy O2 Sat (%): 96 % (19) Pulse via Oximetry: 73 beats per minute (19 2146) O2 Device: Room air (19) Intake/Output Summary (Last 24 hours) at 2019 6699 Last data filed at 2019 7160 Gross per 24 hour Intake 1300 ml Output 750 ml Net 550 ml General:    Well nourished. Alert. Flat affect CV:   RRR. No murmur, rub, or gallop. Lungs:   CTAB. No wheezing, rhonchi, or rales. Room air. Abdomen:   Soft, nontender, nondistended. Bowel sounds present Extremities: Warm and dry. No cyanosis or edema. Weakness in all extremities. Skin:     No rashes or jaundice.   
 
Data Review: 
I have reviewed all labs, meds, telemetry events, and studies from the last 24 hours. Recent Results (from the past 24 hour(s)) GLUCOSE, POC Collection Time: 04/07/19 11:36 AM  
Result Value Ref Range Glucose (POC) 160 (H) 65 - 100 mg/dL GLUCOSE, POC Collection Time: 04/07/19  4:23 PM  
Result Value Ref Range Glucose (POC) 130 (H) 65 - 100 mg/dL GLUCOSE, POC Collection Time: 04/07/19  9:33 PM  
Result Value Ref Range Glucose (POC) 130 (H) 65 - 100 mg/dL PLEASE READ & DOCUMENT PPD TEST IN 72 HRS Collection Time: 04/07/19 11:30 PM  
Result Value Ref Range PPD Negative Negative  
 mm Induration 0 0 - 5 mm CBC WITH AUTOMATED DIFF Collection Time: 04/08/19  4:54 AM  
Result Value Ref Range WBC 4.9 4.3 - 11.1 K/uL  
 RBC 4.02 (L) 4.23 - 5.6 M/uL  
 HGB 12.1 (L) 13.6 - 17.2 g/dL HCT 36.8 (L) 41.1 - 50.3 % MCV 91.5 79.6 - 97.8 FL  
 MCH 30.1 26.1 - 32.9 PG  
 MCHC 32.9 31.4 - 35.0 g/dL  
 RDW 14.1 11.9 - 14.6 % PLATELET 013 350 - 504 K/uL MPV 11.3 9.4 - 12.3 FL ABSOLUTE NRBC 0.00 0.0 - 0.2 K/uL  
 DF AUTOMATED NEUTROPHILS 52 43 - 78 % LYMPHOCYTES 29 13 - 44 % MONOCYTES 14 (H) 4.0 - 12.0 % EOSINOPHILS 4 0.5 - 7.8 % BASOPHILS 1 0.0 - 2.0 % IMMATURE GRANULOCYTES 0 0.0 - 5.0 %  
 ABS. NEUTROPHILS 2.5 1.7 - 8.2 K/UL  
 ABS. LYMPHOCYTES 1.4 0.5 - 4.6 K/UL  
 ABS. MONOCYTES 0.7 0.1 - 1.3 K/UL  
 ABS. EOSINOPHILS 0.2 0.0 - 0.8 K/UL  
 ABS. BASOPHILS 0.0 0.0 - 0.2 K/UL  
 ABS. IMM. GRANS. 0.0 0.0 - 0.5 K/UL METABOLIC PANEL, BASIC Collection Time: 04/08/19  4:54 AM  
Result Value Ref Range Sodium 142 136 - 145 mmol/L Potassium 3.5 3.5 - 5.1 mmol/L Chloride 109 (H) 98 - 107 mmol/L  
 CO2 26 21 - 32 mmol/L Anion gap 7 7 - 16 mmol/L Glucose 121 (H) 65 - 100 mg/dL BUN 9 8 - 23 MG/DL Creatinine 0.95 0.8 - 1.5 MG/DL  
 GFR est AA >60 >60 ml/min/1.73m2 GFR est non-AA >60 >60 ml/min/1.73m2 Calcium 8.5 8.3 - 10.4 MG/DL  
GLUCOSE, POC Collection Time: 04/08/19  6:11 AM  
Result Value Ref Range Glucose (POC) 125 (H) 65 - 100 mg/dL All Micro Results Procedure Component Value Units Date/Time CULTURE, URINE [[id number]]  (Abnormal) Collected:  04/05/19 1343 Order Status:  Completed Specimen:  Cath Urine Updated:  04/08/19 0801 Special Requests: NO SPECIAL REQUESTS Culture result:    
  >100,000 COLONIES/mL GRAM NEGATIVE RODS IDENTIFICATION AND SUSCEPTIBILITY TO FOLLOW CULTURE, URINE [[id number]] Order Status:  Canceled Specimen:  Urine from Clean catch Current Meds: 
Current Facility-Administered Medications Medication Dose Route Frequency  polyethylene glycol (MIRALAX) packet 17 g  17 g Oral DAILY  insulin lispro (HUMALOG) injection   SubCUTAneous AC&HS  lip protectant (BLISTEX) ointment   Topical PRN  
 cefTRIAXone (ROCEPHIN) 1 g in 0.9% sodium chloride (MBP/ADV) 50 mL  1 g IntraVENous Q24H  
 [Held by provider] amiodarone (CORDARONE) tablet 100 mg  100 mg Oral DAILY  amLODIPine (NORVASC) tablet 10 mg  10 mg Oral DAILY  clopidogrel (PLAVIX) tablet 75 mg  75 mg Oral DAILY AFTER BREAKFAST  escitalopram oxalate (LEXAPRO) tablet 20 mg  20 mg Oral DAILY  furosemide (LASIX) tablet 20 mg  20 mg Oral DAILY  gabapentin (NEURONTIN) capsule 100 mg  100 mg Oral TID  hydrALAZINE (APRESOLINE) tablet 50 mg  50 mg Oral TID  [Held by provider] isosorbide mononitrate ER (IMDUR) tablet 30 mg  30 mg Oral DAILY  levothyroxine (SYNTHROID) tablet 50 mcg  50 mcg Oral ACB  [Held by provider] losartan (COZAAR) tablet 100 mg  100 mg Oral DAILY  metoprolol succinate (TOPROL-XL) XL tablet 50 mg  50 mg Oral DAILY  morphine IR (MS IR) tablet 15 mg  15 mg Oral Q4H PRN  pantoprazole (PROTONIX) tablet 40 mg  40 mg Oral DAILY  potassium chloride (K-DUR, KLOR-CON) SR tablet 20 mEq  20 mEq Oral DAILY  prazosin (MINIPRESS) capsule 1 mg  1 mg Oral QHS  risperiDONE (RisperDAL) tablet 1 mg  1 mg Oral DAILY  tamsulosin (FLOMAX) capsule 0.4 mg  0.4 mg Oral DAILY  sodium chloride (NS) flush 5-40 mL  5-40 mL IntraVENous Q8H  
 sodium chloride (NS) flush 5-40 mL  5-40 mL IntraVENous PRN  
 acetaminophen (TYLENOL) tablet 650 mg  650 mg Oral Q4H PRN  
 naloxone (NARCAN) injection 0.4 mg  0.4 mg IntraVENous PRN  
 diphenhydrAMINE (BENADRYL) capsule 25 mg  25 mg Oral Q6H PRN  
 ondansetron (ZOFRAN) injection 4 mg  4 mg IntraVENous Q4H PRN  
 bisacodyl (DULCOLAX) tablet 5 mg  5 mg Oral DAILY PRN  
 LORazepam (ATIVAN) tablet 1 mg  1 mg Oral BID PRN  
 oxyCODONE-acetaminophen (PERCOCET 10)  mg per tablet 1 Tab  1 Tab Oral Q4H PRN Other Studies (last 24 hours): No results found. Assessment and Plan:  
 
Hospital Problems as of 4/8/2019 Date Reviewed: 3/14/2019 Codes Class Noted - Resolved POA Weakness generalized ICD-10-CM: R53.1 ICD-9-CM: 780.79  4/4/2019 - Present Unknown Atrial fibrillation (HCC) (Chronic) ICD-10-CM: I48.91 
ICD-9-CM: 427.31  3/14/2019 - Present Yes Type 2 diabetes with nephropathy (HCC) (Chronic) ICD-10-CM: E11.21 
ICD-9-CM: 250.40, 583.81  11/6/2018 - Present Yes * (Principal) Syncope ICD-10-CM: R55 
ICD-9-CM: 780.2  6/29/2016 - Present Yes Overview Signed 6/29/2016 10:48 PM by Geovanni Manjarrez. Questionable Type 2 diabetes mellitus with diabetic polyneuropathy (HCC) (Chronic) ICD-10-CM: E11.42 
ICD-9-CM: 250.60, 357.2  7/13/2015 - Present Yes Chronic pain syndrome (Chronic) ICD-10-CM: G89.4 ICD-9-CM: 338.4  8/8/2014 - Present Yes Dementia (Chronic) ICD-10-CM: F03.90 ICD-9-CM: 294.20  5/2/2014 - Present Yes Hypertension (Chronic) ICD-10-CM: I10 
ICD-9-CM: 401.9  1/12/2012 - Present Yes PLAN:   
Principal Problem: 
 
Weakness likely d/t UTI  
 -PT/OT/ppd/cm 
-Check daily labs UTI 
-rocephin started 4/5 
-Urine cx with GNR - continue rocephin and follow culture. -prn pain/fever - afebrile overnight 
  
Active Problems: Hypertension (1/12/2012) patient hypotensive after administration of meds 4/5 Patient on many blood pressure medications verified from recent cardiology office visit. Will adjust dosages and monitor.  
  
  Dementia (5/2/2014) Chronic condition is stable, Chronic pain syndrome (8/8/2014) Chronic condition is stable  Type 2 diabetes mellitus with diabetic polyneuropathy ([facility] [address] (7/13/2015) Type 2 diabetes with nephropathy ([facility] [address] (11/6/2018) -SSI 
  
  Atrial fibrillation ([facility] [address] (3/14/2019) Chronic condition is stable May need to Re-consider anti-coagulation prior to d/c due to falls. eliquis held. Plavix continues.  
  
 Hypothyroidism TSH 6.78 - dose recently increased at physician visit. DVT prophylaxis:  scd's Code status: Full;  HCPOA: on file Discharge dispo- wife wanting STR 
  
Plan of care discussed with Dr. Josiah Vasquez 
 
Signed: 
HEIDI Pedersen

## 2019-04-08 NOTE — PROGRESS NOTES
Problem: Mobility Impaired (Adult and Pediatric) Goal: *Acute Goals and Plan of Care (Insert Text) Description LTG: 
(1.)Mr. Savanna King will move from supine to sit and sit to supine , scoot up and down and roll side to side in bed with INDEPENDENT within 7 treatment day(s). (2.)Mr. Savanna King will transfer from bed to chair and chair to bed with CONTACT GUARD ASSIST using the least restrictive device within 7 treatment day(s). (3.)Mr. Savanna King will ambulate with CONTACT GUARD ASSIST for 250+ feet with the least restrictive device within 7 treatment day(s). ________________________________________________________________________________________________ Outcome: Progressing Towards Goal 
  
PHYSICAL THERAPY: Daily Note and PM 4/8/2019 OBSERVATION: PT Visit Days : 2 Payor: Keisha Arrieta [facility] [employer] [facility]/PFFS / Product Type: Managed Care Medicare /   
  
NAME/AGE/GENDER: Donato Winchester is a 78 y.o. male PRIMARY DIAGNOSIS: Syncope [R55] Weakness generalized [R53.1] Syncope Syncope ICD-10: Treatment Diagnosis:  
 · History of falling (Z91.81) Precaution/Allergies: 
Fosinopril and Lisinopril ASSESSMENT:  
Mr. Savanna King presents supine in bed and agreeable for PT . Patient transitioned to sit with CGA to min A with additional time and cueing. Able to prop sit. Stood with CG A and worked on standing balance while RN cleaned patient and changed brief, then transferred to chair. Rested then ambulated 150' with RW and CGA with chair in tow for safety. Demonstrated progress with gait today. Patient has had fall at home and has decreased safety with mobility. Mr. Savanna King would benefit from skilled physical therapy (medically necessary) to address his deficits and maximize his function. This section established at most recent assessment PROBLEM LIST (Impairments causing functional limitations): 1. Decreased ADL/Functional Activities 2. Decreased Transfer Abilities 3. Decreased Ambulation Ability/Technique 4. Decreased Balance 5. Decreased Activity Tolerance 6. Decreased Cognition INTERVENTIONS PLANNED: (Benefits and precautions of physical therapy have been discussed with the patient.) 1. Balance Exercise 2. Bed Mobility 3. Gait Training 4. Therapeutic Activites 5. Therapeutic Exercise/Strengthening 6. Transfer Training 7. education TREATMENT PLAN: Frequency/Duration: 3 times a week for duration of hospital stay Rehabilitation Potential For Stated Goals: Good RECOMMENDED REHABILITATION/EQUIPMENT: (at time of discharge pending progress): Due to the probability of continued deficits (see above) this patient will likely need continued skilled physical therapy after discharge. Equipment:  
? None at this time HISTORY:  
History of Present Injury/Illness (Reason for Referral): 
Per MD note, \"Patient history was obtained from the ER provider prior to seeing the patient. Patient is a 78 y.o. male who presents to the ER from home. He has been at rehab for a couple weeks since last hospital stay (discharge on 3/14) for generalized weakness. He was discharged from rehab yesterday. Wife brings him back stating that she cannot care for him at home. He fell at home last night, hurting both knees and left hip. Since then, he cannot (or will not) walk at home. ER workup with xrays do not show any significant injuries. Pt does not really provide any other history. Denies any problems when asked. \" 
Past Medical History/Comorbidities:  
Mr. Mike Gan  has a past medical history of Abdominal complaints (12/18/2013), Arthritis, Asthma, Atherosclerosis of native artery of extremity with ulceration ([facility] [address] (11/29/2017), Benign nodular prostatic hyperplasia without lower urinary tract symptoms (5/25/2017), BPH (benign prostatic hyperplasia), CAD (coronary artery disease), CAD (coronary artery disease) (1/12/2012), Chronic pain, COPD, Coronary artery disease (4/14/2015), Depression (emotion) (12/18/2013), Diabetes ([facility] [address] GERD (gastroesophageal reflux disease), History of CVA (cerebrovascular accident) (9/14/2016), Hyperlipidemia (5/2/2014), Hypertension, Lumbar spondylosis (8/8/2014), Moderate major depression ([address] (8/10/2018), Neuropathy, Neuropathy, Other ill-defined conditions(799.89), PAD (peripheral artery disease) ([facility] [address] (11/29/2017), Post traumatic stress disorder (1/12/2012), PTSD (post-traumatic stress disorder), S/P placement of cardiac pacemaker (2/3/2019), Seizures ([facility] [address] Spinal stenosis (8/8/2014), Stroke ([facility] [address] Thrombocytopenia, unspecified ([facility] [address] (1/12/2012), and Thyroid disease. Mr. Halle Maher  has a past surgical history that includes hx heent (2010); hx other surgical (1967/68); hx orthopaedic; bone marrow aspirate &biopsy (1/19/2012); pr cardiac surg procedure unlist; hx heart catheterization (4/23/2015); hx back surgery; and pr neurological procedure unlisted. Social History/Living Environment:  
Home Environment: Private residence # Steps to Enter: 10 
Rails to Enter: Yes Hand Rails : Bilateral 
Wheelchair Ramp: No 
One/Two Story Residence: Two story, live on 1st floor Lift Chair Available: No 
Living Alone: No 
Support Systems: Spouse/Significant Other/Partner, Family member(s) Patient Expects to be Discharged to[de-identified] Private residence Current DME Used/Available at Home: Sharran Rump, straight, Walker, rolling, Wheelchair Tub or Shower Type: Tub/Shower combination Prior Level of Function/Work/Activity: 
Lives at home with wife, requires some assist with transfers and ambulates with RW. Number of Personal Factors/Comorbidities that affect the Plan of Care: 1-2: MODERATE COMPLEXITY EXAMINATION:  
Most Recent Physical Functioning:  
Gross Assessment: 
AROM: Generally decreased, functional 
Strength: Generally decreased, functional 
         
  
Posture: 
Posture (WDL): Exceptions to [facility] Posture Assessment: Forward head, Rounded shoulders Balance: Sitting: Impaired Sitting - Static: Good (unsupported) Sitting - Dynamic: Fair (occasional) Standing: Impaired Standing - Static: Constant support Standing - Dynamic : Constant support Bed Mobility: 
Rolling: Minimum assistance; Additional time Supine to Sit: Minimum assistance; Additional time Scooting: Minimum assistance Wheelchair Mobility: 
  
Transfers: 
Sit to Stand: Contact guard assistance Gait: 
  
Speed/Precious: Slow Step Length: Right shortened;Left shortened Distance (ft): 150 Feet (ft) Assistive Device: Gait belt;Walker, rolling Ambulation - Level of Assistance: Contact guard assistance Body Structures Involved: 1. Heart 2. Joints Body Functions Affected: 1. Sensory/Pain 2. Cardio 3. Movement Related Activities and Participation Affected: 1. Mobility 2. Self Care 3. Domestic Life Number of elements that affect the Plan of Care: 4+: HIGH COMPLEXITY CLINICAL PRESENTATION:  
Presentation: Evolving clinical presentation with changing clinical characteristics: MODERATE COMPLEXITY CLINICAL DECISION MAKIN[address] [place] [id number] AM-PAC 6 Clicks Basic Mobility Inpatient Short Form How much difficulty does the patient currently have. .. Unable A Lot A Little None 1. Turning over in bed (including adjusting bedclothes, sheets and blankets)? ? 1   ? 2   ? 3   ? 4  
2. Sitting down on and standing up from a chair with arms ( e.g., wheelchair, bedside commode, etc.)   ? 1   ? 2   ? 3   ? 4  
3. Moving from lying on back to sitting on the side of the bed?   ? 1   ? 2   ? 3   ? 4 How much help from another person does the patient currently need. .. Total A Lot A Little None 4. Moving to and from a bed to a chair (including a wheelchair)? ? 1   ? 2   ? 3   ? 4  
5. Need to walk in hospital room? ? 1   ? 2   ? 3   ? 4  
6. Climbing 3-5 steps with a railing? ? 1   ? 2   ? 3   ? 4  
© 2007, Trustees of [address] [place] [address] [address] [address], under license to [employer].  All rights reserved Score:  Initial: 18 Most Recent: X (Date: -- ) Interpretation of Tool:  Represents activities that are increasingly more difficult (i.e. Bed mobility, Transfers, Gait). Medical Necessity:    
· Patient is expected to demonstrate progress in balance, functional technique and activity tolerance ·  to increase independence with   and improve safety during all functional mobility · . Reason for Services/Other Comments: 
· Patient continues to require skilled intervention due to recent fall and decreased safety · . Use of outcome tool(s) and clinical judgement create a POC that gives a: Clear prediction of patient's progress: LOW COMPLEXITY  
  
 
 
 
TREATMENT:  
(In addition to Assessment/Re-Assessment sessions the following treatments were rendered) Pre-treatment Symptoms/Complaints:  none. Pain: Initial:  
Pain Intensity 1: 0 Pain Orientation 1: Left Pain Intervention(s) 1: Repositioned  Post Session:  0 Therapeutic Activity: (    30): Therapeutic activities including bed mobility, sit to stand several times, ambulation on level ground and standing balance activities in room  to improve mobility, strength and balance. Required minimal cueing   to promote static and dynamic balance in sitting and safe technique . Therapeutic Exercise: (  minutes):  Exercises per grid below to improve strength and coordination. Required minimal visual and verbal cues to promote proper body alignment. Progressed complexity of movement as indicated. DATE: 4/5/19 Ambulation Hip Flexion X10 AB Long Arc Nanine Brinks Knee Squeezes Ankle DF/PF X10 AB Key:  A=active, AA=active assisted, P=passive, B=bilaterally, R=right, L=left DF=dorsiflexion, PF=plantarflexion Braces/Orthotics/Lines/Etc:  
· O2 Device: Room air Treatment/Session Assessment: · Response to Treatment:  pleasant and cooperative, but slow to respond. · Interdisciplinary Collaboration:  
o Physical Therapist 
o Registered Nurse 
o Certified Nursing Assistant/Patient Care Technician · After treatment position/precautions:  
o Up in chair 
o Bed alarm/tab alert on 
o Bed/Chair-wheels locked 
o Call light within reach 
o RN notified 
o PCT at bedside · Compliance with Program/Exercises: Will assess as treatment progresses · Recommendations/Intent for next treatment session: \"Next visit will focus on advancements to more challenging activities and reduction in assistance provided\". Total Treatment Duration: PT Patient Time In/Time Out Time In: 1630 Time Out: 1700 Rodney Barros PT, DPT

## 2019-04-08 NOTE — PROGRESS NOTES
TRANSFER - OUT REPORT: 
 
Verbal report given to ALON Krishnan on Gonzales Mcgill  being transferred to 7th floor for routine progression of care Report consisted of patients Situation, Background, Assessment and Recommendations(SBAR). Information from the following report(s) SBAR, Kardex, Intake/Output, MAR, Recent Results and Med Rec Status was reviewed with the receiving nurse. Opportunity for questions and clarification was provided. Patient sent with belongings.

## 2019-04-08 NOTE — PROGRESS NOTES
Bedside and Verbal shift change report given to [address], RN (oncoming nurse) by self (offgoing nurse). Report included the following information SBAR, Kardex, Intake/Output, MAR, Recent Results and Cardiac Rhythm NM.

## 2019-04-08 NOTE — PROGRESS NOTES
04/08/19 1540 Dual Skin Pressure Injury Assessment Dual Skin Pressure Injury Assessment WDL Second Care Provider (Based on Facility Policy) Mireille Alves RN Skin Integumentary Skin Integumentary (WDL) X Skin Condition/Temp Warm;Dry;Flaky Skin Color Appropriate for ethnicity Skin Integrity Scars (comment) (left ankle, scattered scars) Turgor Non-tenting Pressure  Injury Documentation No Pressure Injury Noted-Pressure Ulcer Prevention Initiated Wound Prevention and Protection Methods Orientation of Wound Prevention Posterior Location of Wound Prevention Sacrum/Coccyx Dressing Present  Yes; Intact, not due to be changed Dressing Status Intact Wound Offloading (Prevention Methods) Bed, pressure reduction mattress; Foam silicone;Pillows;Repositioning Allevyn for protection to sacrum

## 2019-04-08 NOTE — PROGRESS NOTES
MEWS score noted to be 3. Charge nurse, Sheyla Colindres RN informed and assessed patient. High MEWS score noted due to elevated temp and BP. Will continue to monitor.

## 2019-04-08 NOTE — PROGRESS NOTES
Bedside and Verbal shift change report given to self (oncoming nurse) by Perri Lombardi RN (offgoing nurse). Report included the following information SBAR, Kardex, ED Summary, Procedure Summary, Intake/Output, MAR, Recent Results and Cardiac Rhythm NM.

## 2019-04-08 NOTE — PROGRESS NOTES
Spoke with JESUS the liason for [employer] and [facility] and they are working on contract and level of care with the [place] to obtain approval for rehab. Updated wife of information and she in agreement with rehab when approved and medically stable. D/C plan is [facility] once approved from [place]. CM following.

## 2019-04-09 PROBLEM — N39.0 UTI (URINARY TRACT INFECTION): Status: ACTIVE | Noted: 2019-04-09

## 2019-04-09 LAB
ANION GAP SERPL CALC-SCNC: 7 MMOL/L (ref 7–16)
BACTERIA SPEC CULT: ABNORMAL
BACTERIA SPEC CULT: ABNORMAL
BASOPHILS # BLD: 0 K/UL (ref 0–0.2)
BASOPHILS NFR BLD: 1 % (ref 0–2)
BUN SERPL-MCNC: 8 MG/DL (ref 8–23)
CALCIUM SERPL-MCNC: 8.2 MG/DL (ref 8.3–10.4)
CHLORIDE SERPL-SCNC: 108 MMOL/L (ref 98–107)
CO2 SERPL-SCNC: 27 MMOL/L (ref 21–32)
CREAT SERPL-MCNC: 0.97 MG/DL (ref 0.8–1.5)
DIFFERENTIAL METHOD BLD: ABNORMAL
EOSINOPHIL # BLD: 0.2 K/UL (ref 0–0.8)
EOSINOPHIL NFR BLD: 5 % (ref 0.5–7.8)
ERYTHROCYTE [DISTWIDTH] IN BLOOD BY AUTOMATED COUNT: 14.2 % (ref 11.9–14.6)
GLUCOSE BLD STRIP.AUTO-MCNC: 127 MG/DL (ref 65–100)
GLUCOSE BLD STRIP.AUTO-MCNC: 129 MG/DL (ref 65–100)
GLUCOSE BLD STRIP.AUTO-MCNC: 145 MG/DL (ref 65–100)
GLUCOSE BLD STRIP.AUTO-MCNC: 184 MG/DL (ref 65–100)
GLUCOSE SERPL-MCNC: 115 MG/DL (ref 65–100)
HCT VFR BLD AUTO: 37.4 % (ref 41.1–50.3)
HGB BLD-MCNC: 12.4 G/DL (ref 13.6–17.2)
IMM GRANULOCYTES # BLD AUTO: 0 K/UL (ref 0–0.5)
IMM GRANULOCYTES NFR BLD AUTO: 0 % (ref 0–5)
LYMPHOCYTES # BLD: 1 K/UL (ref 0.5–4.6)
LYMPHOCYTES NFR BLD: 23 % (ref 13–44)
MCH RBC QN AUTO: 30.2 PG (ref 26.1–32.9)
MCHC RBC AUTO-ENTMCNC: 33.2 G/DL (ref 31.4–35)
MCV RBC AUTO: 91 FL (ref 79.6–97.8)
MONOCYTES # BLD: 0.5 K/UL (ref 0.1–1.3)
MONOCYTES NFR BLD: 13 % (ref 4–12)
NEUTS SEG # BLD: 2.3 K/UL (ref 1.7–8.2)
NEUTS SEG NFR BLD: 58 % (ref 43–78)
NRBC # BLD: 0 K/UL (ref 0–0.2)
PLATELET # BLD AUTO: 134 K/UL (ref 150–450)
PMV BLD AUTO: 11.7 FL (ref 9.4–12.3)
POTASSIUM SERPL-SCNC: 3 MMOL/L (ref 3.5–5.1)
RBC # BLD AUTO: 4.11 M/UL (ref 4.23–5.6)
SERVICE CMNT-IMP: ABNORMAL
SODIUM SERPL-SCNC: 142 MMOL/L (ref 136–145)
WBC # BLD AUTO: 4.1 K/UL (ref 4.3–11.1)

## 2019-04-09 PROCEDURE — 80048 BASIC METABOLIC PNL TOTAL CA: CPT

## 2019-04-09 PROCEDURE — 97530 THERAPEUTIC ACTIVITIES: CPT

## 2019-04-09 PROCEDURE — 74011000258 HC RX REV CODE- 258: Performed by: INTERNAL MEDICINE

## 2019-04-09 PROCEDURE — 74011000258 HC RX REV CODE- 258: Performed by: NURSE PRACTITIONER

## 2019-04-09 PROCEDURE — 96366 THER/PROPH/DIAG IV INF ADDON: CPT

## 2019-04-09 PROCEDURE — 82962 GLUCOSE BLOOD TEST: CPT

## 2019-04-09 PROCEDURE — 99218 HC RM OBSERVATION: CPT

## 2019-04-09 PROCEDURE — 3331090001 HH PPS REVENUE CREDIT

## 2019-04-09 PROCEDURE — 74011250636 HC RX REV CODE- 250/636: Performed by: INTERNAL MEDICINE

## 2019-04-09 PROCEDURE — 85025 COMPLETE CBC W/AUTO DIFF WBC: CPT

## 2019-04-09 PROCEDURE — 74011250637 HC RX REV CODE- 250/637: Performed by: FAMILY MEDICINE

## 2019-04-09 PROCEDURE — 74011250637 HC RX REV CODE- 250/637: Performed by: INTERNAL MEDICINE

## 2019-04-09 PROCEDURE — 3331090002 HH PPS REVENUE DEBIT

## 2019-04-09 PROCEDURE — 36415 COLL VENOUS BLD VENIPUNCTURE: CPT

## 2019-04-09 PROCEDURE — 74011250636 HC RX REV CODE- 250/636: Performed by: NURSE PRACTITIONER

## 2019-04-09 PROCEDURE — 74011636637 HC RX REV CODE- 636/637: Performed by: NURSE PRACTITIONER

## 2019-04-09 RX ORDER — CEFPODOXIME PROXETIL 200 MG/1
200 TABLET, FILM COATED ORAL EVERY 12 HOURS
Status: DISCONTINUED | OUTPATIENT
Start: 2019-04-09 | End: 2019-04-10 | Stop reason: HOSPADM

## 2019-04-09 RX ADMIN — LEVOTHYROXINE SODIUM 50 MCG: 50 TABLET ORAL at 05:14

## 2019-04-09 RX ADMIN — HYDRALAZINE HYDROCHLORIDE 50 MG: 50 TABLET, FILM COATED ORAL at 08:27

## 2019-04-09 RX ADMIN — POTASSIUM CHLORIDE 20 MEQ: 20 TABLET, EXTENDED RELEASE ORAL at 08:27

## 2019-04-09 RX ADMIN — AMLODIPINE BESYLATE 10 MG: 10 TABLET ORAL at 08:26

## 2019-04-09 RX ADMIN — RISPERIDONE 1 MG: 1 TABLET ORAL at 08:27

## 2019-04-09 RX ADMIN — OXYCODONE HYDROCHLORIDE AND ACETAMINOPHEN 1 TABLET: 10; 325 TABLET ORAL at 22:19

## 2019-04-09 RX ADMIN — GABAPENTIN 100 MG: 100 CAPSULE ORAL at 22:19

## 2019-04-09 RX ADMIN — Medication 10 ML: at 05:17

## 2019-04-09 RX ADMIN — PIPERACILLIN AND TAZOBACTAM 3.38 G: 3; .375 INJECTION, POWDER, FOR SOLUTION INTRAVENOUS at 08:28

## 2019-04-09 RX ADMIN — INSULIN LISPRO 2 UNITS: 100 INJECTION, SOLUTION INTRAVENOUS; SUBCUTANEOUS at 18:34

## 2019-04-09 RX ADMIN — PIPERACILLIN AND TAZOBACTAM 3.38 G: 3; .375 INJECTION, POWDER, FOR SOLUTION INTRAVENOUS at 01:18

## 2019-04-09 RX ADMIN — OXYCODONE HYDROCHLORIDE AND ACETAMINOPHEN 1 TABLET: 10; 325 TABLET ORAL at 05:14

## 2019-04-09 RX ADMIN — ESCITALOPRAM OXALATE 20 MG: 10 TABLET ORAL at 08:27

## 2019-04-09 RX ADMIN — HYDRALAZINE HYDROCHLORIDE 50 MG: 50 TABLET, FILM COATED ORAL at 15:57

## 2019-04-09 RX ADMIN — CLOPIDOGREL BISULFATE 75 MG: 75 TABLET ORAL at 08:28

## 2019-04-09 RX ADMIN — OXYCODONE HYDROCHLORIDE AND ACETAMINOPHEN 1 TABLET: 10; 325 TABLET ORAL at 15:57

## 2019-04-09 RX ADMIN — GABAPENTIN 100 MG: 100 CAPSULE ORAL at 15:57

## 2019-04-09 RX ADMIN — PIPERACILLIN AND TAZOBACTAM 3.38 G: 3; .375 INJECTION, POWDER, FOR SOLUTION INTRAVENOUS at 18:34

## 2019-04-09 RX ADMIN — GABAPENTIN 100 MG: 100 CAPSULE ORAL at 08:27

## 2019-04-09 RX ADMIN — FUROSEMIDE 20 MG: 20 TABLET ORAL at 08:27

## 2019-04-09 RX ADMIN — Medication 5 ML: at 13:14

## 2019-04-09 RX ADMIN — METOPROLOL SUCCINATE 50 MG: 50 TABLET, EXTENDED RELEASE ORAL at 08:27

## 2019-04-09 RX ADMIN — HYDRALAZINE HYDROCHLORIDE 50 MG: 50 TABLET, FILM COATED ORAL at 22:18

## 2019-04-09 RX ADMIN — LOSARTAN POTASSIUM 100 MG: 50 TABLET ORAL at 08:26

## 2019-04-09 RX ADMIN — CEFPODOXIME PROXETIL 200 MG: 200 TABLET, FILM COATED ORAL at 22:19

## 2019-04-09 RX ADMIN — PANTOPRAZOLE SODIUM 40 MG: 40 TABLET, DELAYED RELEASE ORAL at 08:28

## 2019-04-09 RX ADMIN — TAMSULOSIN HYDROCHLORIDE 0.4 MG: 0.4 CAPSULE ORAL at 08:27

## 2019-04-09 RX ADMIN — PRAZOSIN HYDROCHLORIDE 1 MG: 1 CAPSULE ORAL at 22:19

## 2019-04-09 NOTE — PROGRESS NOTES
Hospitalist Progress Note Patient: Frances Villeda MRN: [id number]  SSN: [id number] YOB: 1950  Age: 78 y.o. Sex: male Admit Date: 4/4/2019 LOS: 1 day Subjective:  
 
From previous notes: \"78 yo male who was discharged the day before admission  from [facility] for weakness, who presented to the ER with his wife who stated she cannot care for him at home. She reports he fell hurting both knees and left hip and refusing to walk. ER workup with xrays do not show any significant injuries. Patient with UTI with 4+ bacteria started on rocephin 4/5. Urine cx with GNR. \" 
 
4/9 - He is feeling good today. Got up to the chair with assistance. Denies dysuria. Review of systems negative except stated above. Objective:  
 
Visit Vitals /80 (BP 1 Location: Left arm, BP Patient Position: At rest) Pulse 69 Temp 98.2 °F (36.8 °C) Resp 16 Ht 6' 1\" (1.854 m) Wt 101.6 kg (224 lb) SpO2 96% BMI 29.55 kg/m² Oxygen Therapy O2 Sat (%): 96 % (04/09/19 1130) Pulse via Oximetry: 73 beats per minute (04/04/19 2146) O2 Device: Room air (04/08/19 1405) Intake and Output:  
 
Intake/Output Summary (Last 24 hours) at 4/9/2019 1143 Last data filed at 4/9/2019 1680 Gross per 24 hour Intake 360 ml Output  Net 360 ml Physical Exam:  
GENERAL: alert, cooperative, no distress, appears stated age EYE: conjunctivae/corneas clear. PERRL. THROAT & NECK: normal and no erythema or exudates noted. LUNG: clear to auscultation bilaterally HEART: regular rate and rhythm, S1S2, no murmur, no JVD ABDOMEN: soft, non-tender, non-distended. Bowel sounds normal.  
EXTREMITIES:  No edema, 2+ pedal/radial pulses bilaterally SKIN: no rash or abnormalities NEUROLOGIC: Alert. Cranial nerves 2-12 grossly intact. Lab/Data Review: 
Recent Results (from the past 24 hour(s)) GLUCOSE, POC Collection Time: 04/08/19  4:11 PM  
Result Value Ref Range Glucose (POC) 134 (H) 65 - 100 mg/dL CULTURE, BLOOD Collection Time: 04/08/19  6:55 PM  
Result Value Ref Range Special Requests: LEFT Antecubital 
    
 Culture result: NO GROWTH AFTER 10 HOURS    
CULTURE, BLOOD Collection Time: 04/08/19  7:08 PM  
Result Value Ref Range Special Requests: LEFT 
HAND Culture result: NO GROWTH AFTER 10 HOURS    
GLUCOSE, POC Collection Time: 04/08/19  8:53 PM  
Result Value Ref Range Glucose (POC) 187 (H) 65 - 100 mg/dL CBC WITH AUTOMATED DIFF Collection Time: 04/09/19  5:16 AM  
Result Value Ref Range WBC 4.1 (L) 4.3 - 11.1 K/uL  
 RBC 4.11 (L) 4.23 - 5.6 M/uL  
 HGB 12.4 (L) 13.6 - 17.2 g/dL HCT 37.4 (L) 41.1 - 50.3 % MCV 91.0 79.6 - 97.8 FL  
 MCH 30.2 26.1 - 32.9 PG  
 MCHC 33.2 31.4 - 35.0 g/dL  
 RDW 14.2 11.9 - 14.6 % PLATELET 659 (L) 651 - 450 K/uL MPV 11.7 9.4 - 12.3 FL ABSOLUTE NRBC 0.00 0.0 - 0.2 K/uL  
 DF AUTOMATED NEUTROPHILS 58 43 - 78 % LYMPHOCYTES 23 13 - 44 % MONOCYTES 13 (H) 4.0 - 12.0 % EOSINOPHILS 5 0.5 - 7.8 % BASOPHILS 1 0.0 - 2.0 % IMMATURE GRANULOCYTES 0 0.0 - 5.0 %  
 ABS. NEUTROPHILS 2.3 1.7 - 8.2 K/UL  
 ABS. LYMPHOCYTES 1.0 0.5 - 4.6 K/UL  
 ABS. MONOCYTES 0.5 0.1 - 1.3 K/UL  
 ABS. EOSINOPHILS 0.2 0.0 - 0.8 K/UL  
 ABS. BASOPHILS 0.0 0.0 - 0.2 K/UL  
 ABS. IMM. GRANS. 0.0 0.0 - 0.5 K/UL METABOLIC PANEL, BASIC Collection Time: 04/09/19  5:16 AM  
Result Value Ref Range Sodium 142 136 - 145 mmol/L Potassium 3.0 (L) 3.5 - 5.1 mmol/L Chloride 108 (H) 98 - 107 mmol/L  
 CO2 27 21 - 32 mmol/L Anion gap 7 7 - 16 mmol/L Glucose 115 (H) 65 - 100 mg/dL BUN 8 8 - 23 MG/DL Creatinine 0.97 0.8 - 1.5 MG/DL  
 GFR est AA >60 >60 ml/min/1.73m2 GFR est non-AA >60 >60 ml/min/1.73m2 Calcium 8.2 (L) 8.3 - 10.4 MG/DL  
GLUCOSE, POC Collection Time: 04/09/19  7:08 AM  
Result Value Ref Range Glucose (POC) 129 (H) 65 - 100 mg/dL GLUCOSE, POC  
 Collection Time: 04/09/19 11:08 AM  
Result Value Ref Range Glucose (POC) 127 (H) 65 - 100 mg/dL Imaging: Xr Hip Lt W Or Wo Pelv 2-3 Vws Result Date: 4/4/2019 LEFT HIP, 3 views. HISTORY: Hip pain following fall. TECHNIQUE: AP view of the pelvis and coned down AP and frogleg lateral of the hip. FINDINGS: Bony pelvic ring appears intact. SI joints symmetric. Pubic rami also appear intact. Left femoral head has a round smooth contour. Femoral neck and proximal shaft are intact. There are arterial calcifications. Degenerative changes lower lumbar spine. IMPRESSION:  Negative for hip fracture. Mri Brain Wo Cont Result Date: 3/15/2019 MRI BRAIN WITHOUT CONTRAST. HISTORY: Progressive lower extremity weakness. COMPARISON:  None. Study performed within 24 hours of admission. TECHNIQUE:  Sagittal T1, axial T1, T2, FLAIR, gradient echo, diffusion with ADC map and coronal FLAIR. FINDINGS:  Diffusion images do not demonstrate any areas of restricted diffusion to suggest acute infarction. Mild to moderate nonspecific periventricular and centrum semiovale FLAIR and T2 white matter hyperintensities are present; these do not enhance with contrast. No midline shift, mass or mass effect. Gradient echo images are demonstrates 2 tiny foci of susceptibility artifact. .  No evidence of acute hemorrhage. The lateral ventricles are normal size. The pituitary, parasellar and midline structures are unremarkable on the sagittal T1 images. There are normal T2 flow-voids in the major vessels. Posterior fossa structures are unremarkable. The basal ganglia appear symmetric. Orbits are grossly normal.  Paranasal sinuses are clear. IMPRESSION: No acute infarction. Mild chronic small vessel disease changes. Ct Head Wo Cont Result Date: 3/14/2019 CT head without contrast History: generalized weakness, inability to ambulate; Hx of CVA.  Technique: 5mm axial images were obtained from the skull base to the vertex without contrast. Radiation dose reduction techniques were used for this study: Our CT scanners use one or all of the following: Automated exposure control, adjustment of the mA and/or kVp according to patient's size, iterative reconstruction. Comparison: 02/03/2019 Findings: The ventricles and sulci are appropriate for age. There are no extra-axial fluid collections. No evidence of acute intraparenchymal hemorrhage or mass effect is identified. Patchy areas of decreased attenuation are noted within the supratentorial white matter. These are nonspecific findings but would be most compatible with mild chronic small vessel ischemic changes. There is no evidence to suggest an acute major territorial infarct. There are small remote right basal ganglia lacunar infarctions, unchanged. The bony calvarium is intact. The visualized mastoid air cells and paranasal sinuses are well pneumatized and aerated. Impression: 1. Findings most compatible with mild chronic small vessel ischemic changes and remote right basal ganglia lacunar infarctions are unchanged. 2. Otherwise unremarkable unenhanced CT scan of the brain. Ct Spine Cerv Thorac Lumb Wo Cont Result Date: 3/18/2019 History: Progressive weakness. EXAM: CT cervical, thoracic, and lumbar spine without contrast TECHNIQUE: Thin section axial CT images are obtained through the cervical, thoracic, and lumbar spine. Coronal and sagittal reformatted images are obtained based on the axial data. Radiation dose reduction techniques were used for this study. Our CT scanners use one or all of the following: Automated exposure control, adjustment of the mA and/or kV according to patient size, use of iterative reconstruction. No comparison FINDINGS: Cervical spine: The patient has had prior bilateral laminectomy C3-C5. There is anterior osteophyte formation at multiple levels with loss of disc space.  There is multilevel facet arthropathy. The alignment is normal. Evaluation of the upper lungs demonstrates centrilobular emphysematous change. There is a tiny hypodense lesion within the right lobe of the thyroid. There is multilevel uncovertebral hypertrophy. Thoracic spine: There is S-shaped curvature of the thoracolumbar spine. There is preservation of vertebral body height. There is a spinal cord stimulator present. No definite spinal stenosis or neural foraminal narrowing present. Lumbar spine: There is advanced facet arthropathy at all levels. There is vacuum disc phenomenon at L5-S1 with some endplate irregularity. Vascular calcifications are present within the distal aorta and its primary branches. There is bilateral neural foraminal narrowing at L5-S1 with left-sided neural foraminal narrowing at L3-4 and L4-5. IMPRESSION: 1. Advanced multilevel cervical and lumbar spondylosis. 2. S-shaped curvature of the thoracolumbar spine. 3. Bilateral neural foraminal narrowing at L5-S1 with left-sided neural foraminal compromise L3-4 and L4-5. 4. Presence of a spinal cord stimulator. 5. Bilateral laminectomies C3-C5. Xr Chest [place] Result Date: 4/4/2019 CHEST X-RAY, one view. HISTORY:  Lethargy and pain all over. Shortness of breath TECHNIQUE:  AP upright portable view. COMPARISON: 14 and March 2019 FINDINGS:  The lungs are clear. The heart size is normal. The costophrenic angles are sharp. The pulmonary vasculature is unremarkable. Included portion of the upper abdomen is unremarkable. Aortic calcifications, spinal stimulator type device and cardiac pacemaker are present. IMPRESSION:  Negative for acute change. Xr Chest [place] Result Date: 3/14/2019 Chest X-ray INDICATION: 79-year-old male with increasing weakness since Sunday. Comparison exams: 2/3/2019 A portable AP view of the chest was obtained. FINDINGS: The lungs are clear.  There are no infiltrates or effusions. The heart size is normal.  The bony thorax is intact. IMPRESSION: No acute findings in the chest  
 
Xr Knee Lt 3 V Result Date: 4/4/2019 LEFT KNEE 3 view(s). HISTORY: Left knee pain following fall. TECHNIQUE: AP and lateral and oblique views. COMPARISON: November 2017 study. FINDINGS: No fracture, subluxation or dislocation. Mild degenerative changes. There is some chondrocalcinosis. IMPRESSION: Negative for acute fracture. Xr Knee Rt 3 V Result Date: 4/4/2019 RIGHT KNEE 3 view(s). HISTORY: Right knee pain following fall. TECHNIQUE: AP and lateral and oblique views. COMPARISON: None. FINDINGS: No fracture, subluxation or dislocation. No joint effusion. There are arterial calcifications. IMPRESSION: Negative for acute fracture. No results found for this visit on 04/04/19. Cultures: All Micro Results Procedure Component Value Units Date/Time CULTURE, BLOOD [[id number]] Collected:  04/08/19 1855 Order Status:  Completed Specimen:  Blood Updated:  04/09/19 6668 Special Requests: --     
  LEFT Antecubital 
  
  Culture result: NO GROWTH AFTER 10 HOURS     
 CULTURE, BLOOD [[id number]] Collected:  04/08/19 1908 Order Status:  Completed Specimen:  Blood Updated:  04/09/19 6745 Special Requests: --     
  LEFT 
HAND Culture result: NO GROWTH AFTER 10 HOURS     
 CULTURE, URINE [[id number]]  (Abnormal)  (Susceptibility) Collected:  04/05/19 1343 Order Status:  Completed Specimen:  Cath Urine Updated:  04/09/19 0630 Special Requests: NO SPECIAL REQUESTS Culture result:    
  >100,000 COLONIES/mL PROTEUS MIRABILIS  
     
      
  10,000 to 50,000 COLONIES/mL MIXED SKIN AIMEE ISOLATED  
     
 CULTURE, URINE [[id number]] Order Status:  Canceled Specimen:  Urine from Clean catch Assessment/Plan:  
 
Principal Problem: 
  UTI (urinary tract infection) (4/9/2019) 
- Urine culture with Proteus - Continue Zosyn today - Change to Vantin x 10 more days tomorrow AM 
 
Active Problems: 
  Weakness generalized (4/4/2019) - Due to UTI 
- Slowly improving 
- PT/OT Hypertension (1/12/2012) - Continue Amlodipine - Continue Hydralazine - Continue Cozaar - Continue Toprol Type 2 diabetes with nephropathy ([facility] [address] (11/6/2018) - No acute issues - Continue Humalog SSI Atrial fibrillation ([facility] [address] (3/14/2019) - Stable - Continue Amiodarone - Continue Toprol - No [address] Dementia (5/2/2014) Chronic pain syndrome (8/8/2014) Dispo - [facility] SNF once [facility] DIET DIABETIC CONSISTENT CARB Regular DVT Prophylaxis: SCDs Signed By: Damien Sauceda DO April 9, 2019

## 2019-04-09 NOTE — PROGRESS NOTES
Problem: Mobility Impaired (Adult and Pediatric) Goal: *Acute Goals and Plan of Care (Insert Text) Description LTG: 
(1.)Mr. Ray will move from supine to sit and sit to supine , scoot up and down and roll side to side in bed with INDEPENDENT within 7 treatment day(s). (2.)Mr. Ray will transfer from bed to chair and chair to bed with CONTACT GUARD ASSIST using the least restrictive device within 7 treatment day(s). (3.)Mr. Ray will ambulate with CONTACT GUARD ASSIST for 250+ feet with the least restrictive device within 7 treatment day(s). ________________________________________________________________________________________________ Outcome: Progressing Towards Goal 
  
PHYSICAL THERAPY: Daily Note and AM 4/9/2019 OBSERVATION: PT Visit Days : 3 Payor: Veronika Spear / Plan: [facility] [employer] MEDICARE CHOICE PPO/PFFS / Product Type: [employer] Medicare /   
  
NAME/AGE/GENDER: Bertin Titus is a 78 y.o. male PRIMARY DIAGNOSIS: Syncope [R55] Weakness generalized [R53.1] UTI (urinary tract infection) UTI (urinary tract infection) ICD-10: Treatment Diagnosis:  
 · History of falling (Z91.81) Precaution/Allergies: 
Fosinopril and Lisinopril ASSESSMENT:  
Mr. Ray was supine upon contact and agreeable to PT. Patient performs supine to sit with SBA, additional time, and cues for improved/proper technique. Patient's IV then began to leak blood steadily. RN notified immediately who removed IV. Patient demonstrates good sitting balance. Patient transfers to standing with CGA and ambulates 90' x 2 at a very slow pace with rolling walker and chair follow. Patient needed rest break after 80' due to generalized weakness and fatigue. Patient returns to recliner chair in his room where he was left sitting up with needs in reach and RN at bedside. Overall slow progress towards physical therapy goals. Patient's goals listed above are still appropriate.  Will continue skilled PT to address remaining deficits. This section established at most recent assessment PROBLEM LIST (Impairments causing functional limitations): 1. Decreased ADL/Functional Activities 2. Decreased Transfer Abilities 3. Decreased Ambulation Ability/Technique 4. Decreased Balance 5. Decreased Activity Tolerance 6. Decreased Cognition INTERVENTIONS PLANNED: (Benefits and precautions of physical therapy have been discussed with the patient.) 1. Balance Exercise 2. Bed Mobility 3. Gait Training 4. Therapeutic Activites 5. Therapeutic Exercise/Strengthening 6. Transfer Training 7. education TREATMENT PLAN: Frequency/Duration: 3 times a week for duration of hospital stay Rehabilitation Potential For Stated Goals: Good RECOMMENDED REHABILITATION/EQUIPMENT: (at time of discharge pending progress): Due to the probability of continued deficits (see above) this patient will likely need continued skilled physical therapy after discharge. Equipment:  
? None at this time HISTORY:  
History of Present Injury/Illness (Reason for Referral): 
Per MD note, \"Patient history was obtained from the ER provider prior to seeing the patient. Patient is a 78 y.o. male who presents to the ER from home. He has been at rehab for a couple weeks since last hospital stay (discharge on 3/14) for generalized weakness. He was discharged from rehab yesterday. Wife brings him back stating that she cannot care for him at home. He fell at home last night, hurting both knees and left hip. Since then, he cannot (or will not) walk at home. ER workup with xrays do not show any significant injuries. Pt does not really provide any other history. Denies any problems when asked. \" 
Past Medical History/Comorbidities:  
Mr. Merlyn De La Vega  has a past medical history of Abdominal complaints (12/18/2013), Arthritis, Asthma, Atherosclerosis of native artery of extremity with ulceration ([address] (11/29/2017), Benign nodular prostatic hyperplasia without lower urinary tract symptoms (5/25/2017), BPH (benign prostatic hyperplasia), CAD (coronary artery disease), CAD (coronary artery disease) (1/12/2012), Chronic pain, COPD, Coronary artery disease (4/14/2015), Depression (emotion) (12/18/2013), Diabetes ([address] GERD (gastroesophageal reflux disease), History of CVA (cerebrovascular accident) (9/14/2016), Hyperlipidemia (5/2/2014), Hypertension, Lumbar spondylosis (8/8/2014), Moderate major depression ([address] (8/10/2018), Neuropathy, Neuropathy, Other ill-defined conditions(799.89), PAD (peripheral artery disease) ([address] (11/29/2017), Post traumatic stress disorder (1/12/2012), PTSD (post-traumatic stress disorder), S/P placement of cardiac pacemaker (2/3/2019), Seizures ([address] Spinal stenosis (8/8/2014), Stroke ([address] Thrombocytopenia, unspecified ([address] (1/12/2012), and Thyroid disease. Mr. Brigido Schafer  has a past surgical history that includes hx heent (2010); hx other surgical (1967/68); hx orthopaedic; bone marrow aspirate &biopsy (1/19/2012); pr cardiac surg procedure unlist; hx heart catheterization (4/23/2015); hx back surgery; and pr neurological procedure unlisted. Social History/Living Environment:  
Home Environment: Private residence # Steps to Enter: 10 
Rails to Enter: Yes Hand Rails : Bilateral 
Wheelchair Ramp: No 
One/Two Story Residence: Two story, live on 1st floor Lift Chair Available: No 
Living Alone: No 
Support Systems: Spouse/Significant Other/Partner, Family member(s) Patient Expects to be Discharged to[de-identified] Private residence Current DME Used/Available at Home: Kevin Cotta, straight, Walker, rolling, Wheelchair Tub or Shower Type: Tub/Shower combination Prior Level of Function/Work/Activity: 
Lives at home with wife, requires some assist with transfers and ambulates with RW.  
    
Number of Personal Factors/Comorbidities that affect the Plan of Care: 1-2: MODERATE COMPLEXITY EXAMINATION:  
Most Recent Physical Functioning:  
Gross Assessment: 
  
         
  
Posture: 
  
Balance: 
Sitting: Impaired Sitting - Static: Good (unsupported) Sitting - Dynamic: Fair (occasional) Standing: Impaired Standing - Static: Constant support; Fair 
Standing - Dynamic : Fair Bed Mobility: 
Rolling: Minimum assistance; Additional time Wheelchair Mobility: 
  
Transfers: 
Sit to Stand: Contact guard assistance Stand to Sit: Contact guard assistance Gait: 
  
Speed/Precious: Slow Step Length: Right shortened;Left shortened Distance (ft): (90' x 2) Assistive Device: Walker, rolling;Gait belt Ambulation - Level of Assistance: Contact guard assistance Interventions: Tactile cues; Verbal cues; Safety awareness training Body Structures Involved: 1. Heart 2. Joints Body Functions Affected: 1. Sensory/Pain 2. Cardio 3. Movement Related Activities and Participation Affected: 1. Mobility 2. Self Care 3. Domestic Life Number of elements that affect the Plan of Care: 4+: HIGH COMPLEXITY CLINICAL PRESENTATION:  
Presentation: Evolving clinical presentation with changing clinical characteristics: MODERATE COMPLEXITY CLINICAL DECISION MAKING:  
[facility] MIRAGE -PAC 6 Clicks Basic Mobility Inpatient Short Form How much difficulty does the patient currently have. .. Unable A Lot A Little None 1. Turning over in bed (including adjusting bedclothes, sheets and blankets)? ? 1   ? 2   ? 3   ? 4  
2. Sitting down on and standing up from a chair with arms ( e.g., wheelchair, bedside commode, etc.)   ? 1   ? 2   ? 3   ? 4  
3. Moving from lying on back to sitting on the side of the bed?   ? 1   ? 2   ? 3   ? 4 How much help from another person does the patient currently need. .. Total A Lot A Little None 4. Moving to and from a bed to a chair (including a wheelchair)? ? 1   ? 2   ? 3   ? 4  
5. Need to walk in hospital room?    ? 1   ? 2   ? 3   ? 4  
 6.  Climbing 3-5 steps with a railing? ? 1   ? 2   ? 3   ? 4  
© 2007, Trustees of [address] [place] [address] [address] [address], under license to [employer]. All rights reserved Score:  Initial: 18 Most Recent: X (Date: -- ) Interpretation of Tool:  Represents activities that are increasingly more difficult (i.e. Bed mobility, Transfers, Gait). Medical Necessity:    
· Patient is expected to demonstrate progress in balance, functional technique and activity tolerance ·  to increase independence with   and improve safety during all functional mobility · . Reason for Services/Other Comments: 
· Patient continues to require skilled intervention due to recent fall and decreased safety · . Use of outcome tool(s) and clinical judgement create a POC that gives a: Clear prediction of patient's progress: LOW COMPLEXITY  
  
 
 
 
TREATMENT:  
(In addition to Assessment/Re-Assessment sessions the following treatments were rendered) Pre-treatment Symptoms/Complaints:  none. Pain: Initial:  
Pain Intensity 1: 0  Post Session:  0 Therapeutic Activity: (    23 Minutes): Therapeutic activities including bed mobility training, transfer training, ambulation on level ground, static/dynamic standing balance training, scooting, posture training, instruction in sequencing with rolling walker, and patient education  to improve mobility, strength and balance. Required minimal cueing Tactile cues; Verbal cues; Safety awareness training to promote static and dynamic balance in standing and promote coordination of bilateral, lower extremity(s). Therapeutic Exercise: (  minutes):  Exercises per grid below to improve strength and coordination. Required minimal visual and verbal cues to promote proper body alignment. Progressed complexity of movement as indicated. DATE: 4/5/19 Ambulation Hip Flexion X10 AB Long Arc Ottis Cameron Knee Squeezes Ankle DF/PF X10 AB      
       
       
       
       
 Key:  A=active, AA=active assisted, P=passive, B=bilaterally, R=right, L=left DF=dorsiflexion, PF=plantarflexion Braces/Orthotics/Lines/Etc:  
· O2 Device: Room air Treatment/Session Assessment:   
· Response to Treatment:  See above · Interdisciplinary Collaboration:  
o Physical Therapy Assistant 
o Registered Nurse 
o Rehabilitation Attendant · After treatment position/precautions:  
o Up in chair 
o Bed alarm/tab alert on 
o Bed/Chair-wheels locked 
o Call light within reach 
o RN notified 
o Nurse at bedside · Compliance with Program/Exercises: Will assess as treatment progresses · Recommendations/Intent for next treatment session: \"Next visit will focus on advancements to more challenging activities and reduction in assistance provided\". Total Treatment Duration: PT Patient Time In/Time Out Time In: 2066 Time Out: 1205 Shannon Kumari, PTA

## 2019-04-10 ENCOUNTER — HOME CARE VISIT (OUTPATIENT)
Dept: HOME HEALTH SERVICES | Facility: HOME HEALTH | Age: 69
End: 2019-04-10
Payer: MEDICARE

## 2019-04-10 VITALS
HEART RATE: 60 BPM | BODY MASS INDEX: 29.69 KG/M2 | SYSTOLIC BLOOD PRESSURE: 126 MMHG | RESPIRATION RATE: 14 BRPM | DIASTOLIC BLOOD PRESSURE: 73 MMHG | OXYGEN SATURATION: 92 % | HEIGHT: 73 IN | TEMPERATURE: 98.9 F | WEIGHT: 224 LBS

## 2019-04-10 LAB
ANION GAP SERPL CALC-SCNC: 9 MMOL/L (ref 7–16)
BASOPHILS # BLD: 0.1 K/UL (ref 0–0.2)
BASOPHILS NFR BLD: 2 % (ref 0–2)
BUN SERPL-MCNC: 9 MG/DL (ref 8–23)
CALCIUM SERPL-MCNC: 8.4 MG/DL (ref 8.3–10.4)
CHLORIDE SERPL-SCNC: 109 MMOL/L (ref 98–107)
CO2 SERPL-SCNC: 26 MMOL/L (ref 21–32)
CREAT SERPL-MCNC: 0.92 MG/DL (ref 0.8–1.5)
DIFFERENTIAL METHOD BLD: ABNORMAL
EOSINOPHIL # BLD: 0.2 K/UL (ref 0–0.8)
EOSINOPHIL NFR BLD: 7 % (ref 0.5–7.8)
ERYTHROCYTE [DISTWIDTH] IN BLOOD BY AUTOMATED COUNT: 14.2 % (ref 11.9–14.6)
GLUCOSE BLD STRIP.AUTO-MCNC: 156 MG/DL (ref 65–100)
GLUCOSE SERPL-MCNC: 98 MG/DL (ref 65–100)
HCT VFR BLD AUTO: 35.1 % (ref 41.1–50.3)
HGB BLD-MCNC: 11.5 G/DL (ref 13.6–17.2)
IMM GRANULOCYTES # BLD AUTO: 0 K/UL (ref 0–0.5)
IMM GRANULOCYTES NFR BLD AUTO: 0 % (ref 0–5)
LYMPHOCYTES # BLD: 1.6 K/UL (ref 0.5–4.6)
LYMPHOCYTES NFR BLD: 50 % (ref 13–44)
MCH RBC QN AUTO: 30.2 PG (ref 26.1–32.9)
MCHC RBC AUTO-ENTMCNC: 32.8 G/DL (ref 31.4–35)
MCV RBC AUTO: 92.1 FL (ref 79.6–97.8)
MONOCYTES # BLD: 0.5 K/UL (ref 0.1–1.3)
MONOCYTES NFR BLD: 15 % (ref 4–12)
NEUTS SEG # BLD: 0.9 K/UL (ref 1.7–8.2)
NEUTS SEG NFR BLD: 26 % (ref 43–78)
NRBC # BLD: 0 K/UL (ref 0–0.2)
PLATELET # BLD AUTO: 141 K/UL (ref 150–450)
PLATELET COMMENTS,PCOM: SLIGHT
PMV BLD AUTO: 11.8 FL (ref 9.4–12.3)
POTASSIUM SERPL-SCNC: 3.4 MMOL/L (ref 3.5–5.1)
RBC # BLD AUTO: 3.81 M/UL (ref 4.23–5.6)
RBC MORPH BLD: ABNORMAL
SODIUM SERPL-SCNC: 144 MMOL/L (ref 136–145)
WBC # BLD AUTO: 3.3 K/UL (ref 4.3–11.1)
WBC MORPH BLD: ABNORMAL

## 2019-04-10 PROCEDURE — 99218 HC RM OBSERVATION: CPT

## 2019-04-10 PROCEDURE — 74011250636 HC RX REV CODE- 250/636: Performed by: INTERNAL MEDICINE

## 2019-04-10 PROCEDURE — 74011636637 HC RX REV CODE- 636/637: Performed by: NURSE PRACTITIONER

## 2019-04-10 PROCEDURE — 85025 COMPLETE CBC W/AUTO DIFF WBC: CPT

## 2019-04-10 PROCEDURE — 74011250637 HC RX REV CODE- 250/637: Performed by: FAMILY MEDICINE

## 2019-04-10 PROCEDURE — 82962 GLUCOSE BLOOD TEST: CPT

## 2019-04-10 PROCEDURE — 74011250637 HC RX REV CODE- 250/637: Performed by: INTERNAL MEDICINE

## 2019-04-10 PROCEDURE — 96366 THER/PROPH/DIAG IV INF ADDON: CPT

## 2019-04-10 PROCEDURE — 74011250637 HC RX REV CODE- 250/637: Performed by: NURSE PRACTITIONER

## 2019-04-10 PROCEDURE — 74011000258 HC RX REV CODE- 258: Performed by: INTERNAL MEDICINE

## 2019-04-10 PROCEDURE — 3331090002 HH PPS REVENUE DEBIT

## 2019-04-10 PROCEDURE — 3331090001 HH PPS REVENUE CREDIT

## 2019-04-10 PROCEDURE — 80048 BASIC METABOLIC PNL TOTAL CA: CPT

## 2019-04-10 PROCEDURE — 36415 COLL VENOUS BLD VENIPUNCTURE: CPT

## 2019-04-10 RX ORDER — OXYCODONE AND ACETAMINOPHEN 10; 325 MG/1; MG/1
1 TABLET ORAL
Qty: 20 TAB | Refills: 0 | Status: SHIPPED | OUTPATIENT
Start: 2019-04-10 | End: 2019-04-13

## 2019-04-10 RX ORDER — CEFPODOXIME PROXETIL 200 MG/1
200 TABLET, FILM COATED ORAL EVERY 12 HOURS
Qty: 18 TAB | Refills: 0 | Status: SHIPPED
Start: 2019-04-10 | End: 2019-04-19

## 2019-04-10 RX ORDER — MORPHINE SULFATE 15 MG/1
15 TABLET ORAL EVERY 12 HOURS
Qty: 6 TAB | Refills: 0 | Status: SHIPPED | OUTPATIENT
Start: 2019-04-10 | End: 2019-04-13

## 2019-04-10 RX ORDER — POTASSIUM CHLORIDE 20 MEQ/1
40 TABLET, EXTENDED RELEASE ORAL
Status: COMPLETED | OUTPATIENT
Start: 2019-04-10 | End: 2019-04-10

## 2019-04-10 RX ADMIN — METOPROLOL SUCCINATE 50 MG: 50 TABLET, EXTENDED RELEASE ORAL at 08:35

## 2019-04-10 RX ADMIN — LOSARTAN POTASSIUM 100 MG: 50 TABLET ORAL at 08:34

## 2019-04-10 RX ADMIN — CEFPODOXIME PROXETIL 200 MG: 200 TABLET, FILM COATED ORAL at 08:34

## 2019-04-10 RX ADMIN — INSULIN LISPRO 2 UNITS: 100 INJECTION, SOLUTION INTRAVENOUS; SUBCUTANEOUS at 08:33

## 2019-04-10 RX ADMIN — GABAPENTIN 100 MG: 100 CAPSULE ORAL at 08:34

## 2019-04-10 RX ADMIN — CLOPIDOGREL BISULFATE 75 MG: 75 TABLET ORAL at 08:35

## 2019-04-10 RX ADMIN — ESCITALOPRAM OXALATE 20 MG: 10 TABLET ORAL at 08:35

## 2019-04-10 RX ADMIN — POTASSIUM CHLORIDE 40 MEQ: 20 TABLET, EXTENDED RELEASE ORAL at 09:54

## 2019-04-10 RX ADMIN — LEVOTHYROXINE SODIUM 50 MCG: 50 TABLET ORAL at 05:36

## 2019-04-10 RX ADMIN — RISPERIDONE 1 MG: 1 TABLET ORAL at 08:34

## 2019-04-10 RX ADMIN — Medication 5 ML: at 05:36

## 2019-04-10 RX ADMIN — TAMSULOSIN HYDROCHLORIDE 0.4 MG: 0.4 CAPSULE ORAL at 08:36

## 2019-04-10 RX ADMIN — POTASSIUM CHLORIDE 20 MEQ: 20 TABLET, EXTENDED RELEASE ORAL at 08:36

## 2019-04-10 RX ADMIN — AMLODIPINE BESYLATE 10 MG: 10 TABLET ORAL at 08:34

## 2019-04-10 RX ADMIN — PIPERACILLIN AND TAZOBACTAM 3.38 G: 3; .375 INJECTION, POWDER, FOR SOLUTION INTRAVENOUS at 00:08

## 2019-04-10 RX ADMIN — POLYETHYLENE GLYCOL 3350 17 G: 17 POWDER, FOR SOLUTION ORAL at 08:33

## 2019-04-10 RX ADMIN — PANTOPRAZOLE SODIUM 40 MG: 40 TABLET, DELAYED RELEASE ORAL at 08:35

## 2019-04-10 RX ADMIN — HYDRALAZINE HYDROCHLORIDE 50 MG: 50 TABLET, FILM COATED ORAL at 08:35

## 2019-04-10 RX ADMIN — OXYCODONE HYDROCHLORIDE AND ACETAMINOPHEN 1 TABLET: 10; 325 TABLET ORAL at 09:54

## 2019-04-10 RX ADMIN — FUROSEMIDE 20 MG: 20 TABLET ORAL at 08:34

## 2019-04-10 NOTE — PROGRESS NOTES
CM received phone from [facility] at the [place] ([phone]) stating patient has recevied approval for [facility] a [facility] for STR. LOC approved. Dr. Lana Allen notified. When medically cleared will proceed with transfer.

## 2019-04-10 NOTE — PROGRESS NOTES
TRANSFER - OUT REPORT: 
 
Verbal report given to Kim(name) on Abner December  being transferred to [facility](unit) for routine progression of care Report consisted of patients Situation, Background, Assessment and  
Recommendations(SBAR). Information from the following report(s) SBAR, Kardex, Intake/Output, MAR and Recent Results was reviewed with the receiving nurse. Lines:  
Peripheral IV 04/09/19 Right Wrist (Active) Site Assessment Clean, dry, & intact 4/10/2019  8:35 AM  
Phlebitis Assessment 0 4/10/2019  8:35 AM  
Infiltration Assessment 0 4/10/2019  8:35 AM  
Dressing Status Clean, dry, & intact 4/10/2019  8:35 AM  
Dressing Type Tape;Transparent 4/10/2019  8:35 AM  
Hub Color/Line Status Blue 4/10/2019  8:35 AM  
  
 
Opportunity for questions and clarification was provided. Patient transported with: 
 [employer]

## 2019-04-10 NOTE — PROGRESS NOTES
Patient scheduled for transport to [facility] room # 16W today at 11:30am via stretcher by [facility] ambulance. Patient and wife Nae Collier ( [phone])[id number] of transfer. Care Management Interventions PCP Verified by CM: Yes 
Palliative Care Criteria Met (RRAT>21 & CHF Dx)?: No(RRAT 30 Dx weakness) Transition of Care Consult (CM Consult): Discharge Planning Discharge Durable Medical Equipment: No(walker) Physical Therapy Consult: Yes Occupational Therapy Consult: Yes Speech Therapy Consult: No 
Current Support Network: Lives with Spouse Confirm Follow Up Transport: Family Plan discussed with Pt/Family/Caregiver: Yes Freedom of Choice Offered: Yes Discharge Location Discharge Placement: [facility]

## 2019-04-10 NOTE — DISCHARGE SUMMARY
Hospitalist Discharge Summary     Patient ID:  Mamta Taveras  [id number]  65 y.o.  1950  Admit date: 4/4/2019  4:31 PM  Discharge date and time: 4/10/2019  Attending: Darek Mccloud DO  PCP:  Brad Scanlon MD  Treatment Team: Attending Provider: Darek Mccloud DO; Utilization Review: Esequiel Beck RN; Care Manager: Robin Colbert RN; Occupational Therapist: Shruti Paige OT    Principal Diagnosis UTI (urinary tract infection)    Hospital Problems as of 4/10/2019 Date Reviewed: 3/14/2019          Codes Class Noted - Resolved POA    * (Principal) UTI (urinary tract infection) ICD-10-CM: N39.0  ICD-9-CM: 599.0  4/9/2019 - Present Yes        Weakness generalized ICD-10-CM: R53.1  ICD-9-CM: 780.79  4/4/2019 - Present Yes        Syncope ICD-10-CM: R55  ICD-9-CM: 780.2  6/29/2016 - Present Yes    Overview Signed 6/29/2016 10:48 PM by Vitor Saravia. Questionable              Atrial fibrillation (HCC) (Chronic) ICD-10-CM: I48.91  ICD-9-CM: 427.31  3/14/2019 - Present Yes        Type 2 diabetes with nephropathy (HCC) (Chronic) ICD-10-CM: E11.21  ICD-9-CM: 250.40, 583.81  11/6/2018 - Present Yes        Hypertension (Chronic) ICD-10-CM: I10  ICD-9-CM: 401.9  1/12/2012 - Present Yes        Chronic pain syndrome (Chronic) ICD-10-CM: G89.4  ICD-9-CM: 338.4  8/8/2014 - Present Yes        Dementia (Chronic) ICD-10-CM: F03.90  ICD-9-CM: 294.20  5/2/2014 - Present Yes        Type 2 diabetes mellitus with diabetic polyneuropathy (HCC) (Chronic) ICD-10-CM: E11.42  ICD-9-CM: 250.60, 357.2  7/13/2015 - Present                   Hospital Course:  77 yo male who was discharged the day before admission from [facility] for weakness, who presented to the ER with his wife who stated she cannot care for him at home. She reports he fell hurting both knees and left hip and refusing to walk. ER workup with xrays do not show any significant injuries.  Patient with UTI with 4+ bacteria started on rocephin 4/5. Urine cx with Providencia. He improved but remained weak. He was changed to Vantin to complete a 14 day course. He was discharged to rehab in stable condition. Significant Diagnostic Studies:    Labs: Results:       Chemistry Recent Labs     04/10/19  0353 04/09/19  0516 04/08/19  0454   GLU 98 115* 121*    142 142   K 3.4* 3.0* 3.5   * 108* 109*   CO2 26 27 26   BUN 9 8 9   CREA 0.92 0.97 0.95   CA 8.4 8.2* 8.5   AGAP 9 7 7      CBC w/Diff Recent Labs     04/10/19  0353 04/09/19  0516 04/08/19  0454   WBC 3.3* 4.1* 4.9   RBC 3.81* 4.11* 4.02*   HGB 11.5* 12.4* 12.1*   HCT 35.1* 37.4* 36.8*   * 134* 152   GRANS 26* 58 52   LYMPH 50* 23 29   EOS 7 5 4      Cardiac Enzymes No results for input(s): CPK, CKND1, ELIEZER in the last 72 hours. No lab exists for component: CKRMB, TROIP   Coagulation No results for input(s): PTP, INR, APTT in the last 72 hours. No lab exists for component: INREXT    Lipid Panel Lab Results   Component Value Date/Time    Cholesterol, total 110 07/27/2018 01:49 PM    HDL Cholesterol 36 (L) 07/27/2018 01:49 PM    LDL, calculated 46 07/27/2018 01:49 PM    VLDL, calculated 28 07/27/2018 01:49 PM    Triglyceride 142 07/27/2018 01:49 PM    CHOL/HDL Ratio 2.2 11/28/2017 04:28 AM      BNP No results for input(s): BNPP in the last 72 hours. Liver Enzymes No results for input(s): TP, ALB, TBIL, AP, SGOT, GPT in the last 72 hours. No lab exists for component: DBIL   Thyroid Studies Lab Results   Component Value Date/Time    TSH 6.780 (H) 04/04/2019 04:41 PM            Imaging:  Xr Hip Lt W Or Wo Pelv 2-3 Vws    Result Date: 4/4/2019  IMPRESSION:  Negative for hip fracture. Xr Chest Port    Result Date: 4/4/2019  IMPRESSION:  Negative for acute change. Xr Knee Lt 3 V    Result Date: 4/4/2019  IMPRESSION: Negative for acute fracture. Xr Knee Rt 3 V    Result Date: 4/4/2019  IMPRESSION: Negative for acute fracture. Microbiology/Cultures:   All Micro Results     Procedure Component Value Units Date/Time    CULTURE, BLOOD [[id number]] Collected:  04/08/19 1908    Order Status:  Completed Specimen:  Blood Updated:  04/10/19 0636     Special Requests: --        LEFT  HAND       Culture result: NO GROWTH 2 DAYS       CULTURE, BLOOD [[id number]] Collected:  04/08/19 1855    Order Status:  Completed Specimen:  Blood Updated:  04/10/19 0636     Special Requests: --        LEFT  Antecubital       Culture result: NO GROWTH 2 DAYS       CULTURE, URINE [[id number]]  (Abnormal)  (Susceptibility) Collected:  04/05/19 1343    Order Status:  Completed Specimen:  Cath Urine Updated:  04/09/19 0630     Special Requests: NO SPECIAL REQUESTS        Culture result:       >100,000 COLONIES/mL PROTEUS MIRABILIS                  10,000 to 50,000 COLONIES/mL MIXED SKIN AIMEE ISOLATED          CULTURE, URINE [[id number]]     Order Status:  Canceled Specimen:  Urine from Clean catch           Discharge Exam:  Visit Vitals  /73 (BP 1 Location: Right arm, BP Patient Position: At rest)   Pulse 60   Temp 98.9 °F (37.2 °C)   Resp 14   Ht 6' 1\" (1.854 m)   Wt 101.6 kg (224 lb)   SpO2 92%   BMI 29.55 kg/m²     General appearance: alert, cooperative, no distress, appears stated age  Lungs: clear to auscultation bilaterally  Heart: regular rate and rhythm, S1, S2 normal, no murmur, click, rub or gallop  Abdomen: soft, non-tender. Bowel sounds normal. No masses,  no organomegaly  Extremities: no cyanosis or edema  Neurologic: Grossly normal    Disposition: Rehab  Discharge Condition: stable  Patient Instructions:   Current Discharge Medication List      START taking these medications    Details   cefpodoxime (VANTIN) 200 mg tablet Take 1 Tab by mouth every twelve (12) hours for 9 days. Qty: 18 Tab, Refills: 0      oxyCODONE-acetaminophen (PERCOCET 10)  mg per tablet Take 1 Tab by mouth every four (4) hours as needed for Pain for up to 3 days. Max Daily Amount: 6 Tabs.   Qty: 20 Tab, Refills: 0    Associated Diagnoses: Chronic pain syndrome         CONTINUE these medications which have CHANGED    Details   morphine IR (MS IR) 15 mg tablet Take 1 Tab by mouth every twelve (12) hours every twelve (12) hours for 3 days. Max Daily Amount: 30 mg.  Qty: 6 Tab, Refills: 0    Associated Diagnoses: Chronic pain syndrome         CONTINUE these medications which have NOT CHANGED    Details   polyethylene glycol (MIRALAX) 17 gram packet Take 17 g by mouth as needed. !! risperiDONE (RISPERDAL) 0.5 mg tablet Take 0.5 mg by mouth daily. atorvastatin (LIPITOR) 80 mg tablet Take 80 mg by mouth nightly. ipratropium-albuterol (COMBIVENT RESPIMAT)  mcg/actuation inhaler 3 mL by Nebulization route four (4) times daily. oxybutynin (DITROPAN) 5 mg tablet Take 5 mg by mouth nightly. tamsulosin (FLOMAX) 0.4 mg capsule Take 0.4 mg by mouth daily. !! insulin NPH (HUMULIN N) 100 unit/mL (3 mL) inpn 15 Units by SubCUTAneous route Daily (before breakfast). !! insulin NPH (HUMULIN N) 100 unit/mL (3 mL) inpn 15 Units by SubCUTAneous route nightly. clopidogrel (PLAVIX) 75 mg tab Take 75 mg by mouth daily (after breakfast). prazosin (MINIPRESS) 1 mg capsule Take 1 mg by mouth nightly. amLODIPine (NORVASC) 10 mg tablet Take 10 mg by mouth daily. escitalopram oxalate (LEXAPRO) 20 mg tablet Take 20 mg by mouth daily. galantamine (RAZADYNE) 8 mg tablet Take  by mouth two (2) times a day.      gabapentin (NEURONTIN) 100 mg capsule Take 1 Cap by mouth three (3) times daily for 30 days. Qty: 90 Cap, Refills: 0      hydrALAZINE (APRESOLINE) 50 mg tablet Take 1 Tab by mouth three (3) times daily for 30 days. Qty: 90 Tab, Refills: 0      levothyroxine (SYNTHROID) 50 mcg tablet Take 1 Tab by mouth Daily (before breakfast) for 30 days. Qty: 30 Tab, Refills: 0      lancets (ACCU-CHEK FASTCLIX LANCING DEV) [facility] Accu Check Fastclix LancetsPt is to check BS bid.  Dx.E11.9  Qty: 200 Each, Refills: 3      glucose blood VI test strips (ACCU-CHEK GUIDE) strip Accu check Guide Strips  Pt is to check BS Bid. Dx:E11.9  Qty: 200 Strip, Refills: 3      potassium chloride (K-DUR, KLOR-CON) 20 mEq tablet Take 1 Tab by mouth daily. Qty: 90 Tab, Refills: 3      furosemide (LASIX) 20 mg tablet Take 1 Tab by mouth daily. Take  by mouth daily. Qty: 90 Tab, Refills: 3    Associated Diagnoses: Benign essential HTN      metoprolol succinate (TOPROL-XL) 50 mg XL tablet Take 1 Tab by mouth daily. Qty: 30 Tab, Refills: 6      losartan (COZAAR) 100 mg tablet Take 1 Tab by mouth daily. Qty: 30 Tab, Refills: 6      Blood Glucose Control High&Low (ACCU-CHEK GUIDE L1-L2 CTRL SOL) soln Accu Check Guide Control Solution L1-L2 Use as directed  DX E11.9  Qty: 1 Bottle, Refills: 5      pantoprazole (PROTONIX) 40 mg tablet Take 40 mg by mouth daily. !! risperiDONE (RISPERDAL) 2 mg tablet Take 1 mg by mouth every evening. isosorbide mononitrate ER (IMDUR) 30 mg tablet Take 30 mg by mouth daily. cyanocobalamin (VITAMIN B-12) 1,000 mcg tablet Take 1,000 mcg by mouth daily. nitroglycerin (NITROSTAT) 0.4 mg SL tablet by SubLINGual route every five (5) minutes as needed. !! - Potential duplicate medications found. Please discuss with provider.       STOP taking these medications       amiodarone (PACERONE) 100 mg tablet Comments:   Reason for Stopping:         amiodarone (PACERONE) 100 mg tablet Comments:   Reason for Stopping:         apixaban (ELIQUIS) 5 mg tablet Comments:   Reason for Stopping:               Activity: Activity as tolerated  Diet: Regular Diet  Wound Care: None needed    Follow-up  ·   Dr. Maikol Gruber in one week  Time spent to discharge patient 35 minutes  Signed:  Jo Joel DO  4/10/2019  9:30 AM

## 2019-04-11 PROCEDURE — 3331090001 HH PPS REVENUE CREDIT

## 2019-04-11 PROCEDURE — 3331090002 HH PPS REVENUE DEBIT

## 2019-04-12 ENCOUNTER — HOME CARE VISIT (OUTPATIENT)
Dept: HOME HEALTH SERVICES | Facility: HOME HEALTH | Age: 69
End: 2019-04-12
Payer: MEDICARE

## 2019-04-12 PROCEDURE — 3331090001 HH PPS REVENUE CREDIT

## 2019-04-12 PROCEDURE — 3331090002 HH PPS REVENUE DEBIT

## 2019-04-13 LAB
BACTERIA SPEC CULT: NORMAL
BACTERIA SPEC CULT: NORMAL
SERVICE CMNT-IMP: NORMAL
SERVICE CMNT-IMP: NORMAL

## 2019-04-13 PROCEDURE — 3331090001 HH PPS REVENUE CREDIT

## 2019-04-13 PROCEDURE — 3331090002 HH PPS REVENUE DEBIT

## 2019-04-14 PROCEDURE — 3331090002 HH PPS REVENUE DEBIT

## 2019-04-14 PROCEDURE — 3331090001 HH PPS REVENUE CREDIT

## 2019-04-15 PROCEDURE — 3331090002 HH PPS REVENUE DEBIT

## 2019-04-15 PROCEDURE — 3331090001 HH PPS REVENUE CREDIT

## 2019-04-16 PROCEDURE — 3331090001 HH PPS REVENUE CREDIT

## 2019-04-16 PROCEDURE — 3331090002 HH PPS REVENUE DEBIT

## 2019-04-17 PROCEDURE — 3331090001 HH PPS REVENUE CREDIT

## 2019-04-17 PROCEDURE — 3331090002 HH PPS REVENUE DEBIT

## 2019-04-18 PROCEDURE — 3331090001 HH PPS REVENUE CREDIT

## 2019-04-18 PROCEDURE — 3331090002 HH PPS REVENUE DEBIT

## 2019-04-19 PROCEDURE — 3331090002 HH PPS REVENUE DEBIT

## 2019-04-19 PROCEDURE — 3331090001 HH PPS REVENUE CREDIT

## 2019-04-20 PROCEDURE — 3331090001 HH PPS REVENUE CREDIT

## 2019-04-20 PROCEDURE — 3331090002 HH PPS REVENUE DEBIT

## 2019-04-21 PROCEDURE — 3331090002 HH PPS REVENUE DEBIT

## 2019-04-21 PROCEDURE — 3331090001 HH PPS REVENUE CREDIT

## 2019-04-22 PROCEDURE — 3331090002 HH PPS REVENUE DEBIT

## 2019-04-22 PROCEDURE — 3331090001 HH PPS REVENUE CREDIT

## 2019-04-23 PROCEDURE — 3331090002 HH PPS REVENUE DEBIT

## 2019-04-23 PROCEDURE — 3331090001 HH PPS REVENUE CREDIT

## 2019-04-24 ENCOUNTER — HOME CARE VISIT (OUTPATIENT)
Dept: HOME HEALTH SERVICES | Facility: HOME HEALTH | Age: 69
End: 2019-04-24
Payer: MEDICARE

## 2019-04-24 PROCEDURE — 3331090001 HH PPS REVENUE CREDIT

## 2019-04-24 PROCEDURE — 3331090002 HH PPS REVENUE DEBIT

## 2019-04-30 ENCOUNTER — HOSPITAL ENCOUNTER (OUTPATIENT)
Dept: LAB | Age: 69
Discharge: HOME | End: 2019-04-30

## 2019-04-30 LAB
ANION GAP SERPL CALC-SCNC: 7 MMOL/L (ref 7–16)
BUN SERPL-MCNC: 8 MG/DL (ref 8–23)
CALCIUM SERPL-MCNC: 8.5 MG/DL (ref 8.3–10.4)
CHLORIDE SERPL-SCNC: 108 MMOL/L (ref 98–107)
CO2 SERPL-SCNC: 28 MMOL/L (ref 21–32)
CREAT SERPL-MCNC: 1 MG/DL (ref 0.8–1.5)
ERYTHROCYTE [DISTWIDTH] IN BLOOD BY AUTOMATED COUNT: 14.6 % (ref 11.9–14.6)
GLUCOSE SERPL-MCNC: 165 MG/DL (ref 65–100)
HCT VFR BLD AUTO: 37.6 % (ref 41.1–50.3)
HGB BLD-MCNC: 12 G/DL (ref 13.6–17.2)
MAGNESIUM SERPL-MCNC: 1.7 MG/DL (ref 1.8–2.4)
MCH RBC QN AUTO: 29.9 PG (ref 26.1–32.9)
MCHC RBC AUTO-ENTMCNC: 31.9 G/DL (ref 31.4–35)
MCV RBC AUTO: 93.5 FL (ref 79.6–97.8)
NRBC # BLD: 0 K/UL (ref 0–0.2)
PLATELET # BLD AUTO: 190 K/UL (ref 150–450)
PMV BLD AUTO: 11.6 FL (ref 9.4–12.3)
POTASSIUM SERPL-SCNC: 4.2 MMOL/L (ref 3.5–5.1)
RBC # BLD AUTO: 4.02 M/UL (ref 4.23–5.6)
SODIUM SERPL-SCNC: 143 MMOL/L (ref 136–145)
WBC # BLD AUTO: 4.8 K/UL (ref 4.3–11.1)

## 2019-04-30 PROCEDURE — 80048 BASIC METABOLIC PNL TOTAL CA: CPT

## 2019-04-30 PROCEDURE — 85027 COMPLETE CBC AUTOMATED: CPT

## 2019-04-30 PROCEDURE — 83735 ASSAY OF MAGNESIUM: CPT

## 2019-05-01 ENCOUNTER — HOME CARE VISIT (OUTPATIENT)
Dept: HOME HEALTH SERVICES | Facility: HOME HEALTH | Age: 69
End: 2019-05-01

## 2019-12-19 PROBLEM — R00.1 BRADYCARDIA: Status: ACTIVE | Noted: 2019-12-19

## 2022-03-18 PROBLEM — E11.21 TYPE 2 DIABETES WITH NEPHROPATHY (HCC): Status: ACTIVE | Noted: 2018-11-06

## 2022-03-18 PROBLEM — R00.1 BRADYCARDIA: Status: ACTIVE | Noted: 2019-12-19

## 2022-03-19 PROBLEM — N39.0 UTI (URINARY TRACT INFECTION): Status: ACTIVE | Noted: 2019-04-09

## 2022-03-19 PROBLEM — R53.1 WEAKNESS GENERALIZED: Status: ACTIVE | Noted: 2019-04-04

## 2022-03-20 PROBLEM — I48.91 ATRIAL FIBRILLATION (HCC): Status: ACTIVE | Noted: 2019-03-14
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
## 2024-02-27 NOTE — PROGRESS NOTES
Mother at bedside , told staff that she received a call from a friend in whom he was out with and she told them to take him   to the ER   Mom told writer,Leti VELÁSQUEZ Rn that he was out drinking since early this morning    Problem: Mobility Impaired (Adult and Pediatric) Goal: *Acute Goals and Plan of Care (Insert Text) Description LTG: 
(1.)Mr. Ileene Schwab will move from supine to sit and sit to supine , scoot up and down and roll side to side in bed with INDEPENDENT within 7 treatment day(s). (2.)Mr. Ileene Schwab will transfer from bed to chair and chair to bed with CONTACT GUARD ASSIST using the least restrictive device within 7 treatment day(s). (3.)Mr. Ileene Schwab will ambulate with CONTACT GUARD ASSIST for 250+ feet with the least restrictive device within 7 treatment day(s). ________________________________________________________________________________________________ Outcome: Progressing Towards Goal 
  
PHYSICAL THERAPY: Initial Assessment, Daily Note and AM 4/5/2019 OBSERVATION: PT Visit Days : 1 Payor: Veronika Spear / Plan: [facility] [employer] MEDICARE CHOICE PPO/PFFS / Product Type: [employer] Medicare /   
  
NAME/AGE/GENDER: Bertin Titus is a 78 y.o. male PRIMARY DIAGNOSIS: Syncope [R55] Weakness generalized [R53.1] Syncope Syncope ICD-10: Treatment Diagnosis:  
 History of falling (Z91.81) Precaution/Allergies: 
Fosinopril and Lisinopril ASSESSMENT:  
 
Mr. Ileene Schwab presents supine in bed and agreeable for PT assessment. He endorses pain on his left side from falling at home. Patient transitioned to sit with CGA to min A with additional time and cueing. Able to prop sit. Stood with min A and ambulated 100' with RW and CGA with chair in tow for safety. Patient has had fall at home and has decreased safety with mobility. Mr. Ileene Schwab would benefit from skilled physical therapy (medically necessary) to address his deficits and maximize his function. Initiated treatment to include standing balance at EOB while RN changed his brief. Also sitting balance on EOB while gown changed. Patient also completed LE exercises in sitting. Good participation and effort. This section established at most recent assessment PROBLEM LIST (Impairments causing functional limitations): 
Decreased ADL/Functional Activities Decreased Transfer Abilities Decreased Ambulation Ability/Technique Decreased Balance Decreased Activity Tolerance Decreased Cognition INTERVENTIONS PLANNED: (Benefits and precautions of physical therapy have been discussed with the patient.) Balance Exercise Bed Mobility Gait Training Therapeutic Activites Therapeutic Exercise/Strengthening Transfer Training 
education TREATMENT PLAN: Frequency/Duration: 3 times a week for duration of hospital stay Rehabilitation Potential For Stated Goals: Good RECOMMENDED REHABILITATION/EQUIPMENT: (at time of discharge pending progress): Due to the probability of continued deficits (see above) this patient will likely need continued skilled physical therapy after discharge. Equipment:  
None at this time HISTORY:  
History of Present Injury/Illness (Reason for Referral): 
Per MD note, \"Patient history was obtained from the ER provider prior to seeing the patient. Patient is a 78 y.o. male who presents to the ER from home. He has been at rehab for a couple weeks since last hospital stay (discharge on 3/14) for generalized weakness. He was discharged from rehab yesterday. Wife brings him back stating that she cannot care for him at home. He fell at home last night, hurting both knees and left hip. Since then, he cannot (or will not) walk at home. ER workup with xrays do not show any significant injuries. Pt does not really provide any other history. Denies any problems when asked. \" 
Past Medical History/Comorbidities:  
Mr. Pipo Mack  has a past medical history of Abdominal complaints (12/18/2013), Arthritis, Asthma, Atherosclerosis of native artery of extremity with ulceration ([facility] [address] (11/29/2017), Benign nodular prostatic hyperplasia without lower urinary tract symptoms (5/25/2017), BPH (benign prostatic hyperplasia), CAD (coronary artery disease), CAD (coronary artery disease) (1/12/2012), Chronic pain, COPD, Coronary artery disease (4/14/2015), Depression (emotion) (12/18/2013), Diabetes ([address] GERD (gastroesophageal reflux disease), History of CVA (cerebrovascular accident) (9/14/2016), Hyperlipidemia (5/2/2014), Hypertension, Lumbar spondylosis (8/8/2014), Moderate major depression ([address] (8/10/2018), Neuropathy, Neuropathy, Other ill-defined conditions(799.89), PAD (peripheral artery disease) ([address] (11/29/2017), Post traumatic stress disorder (1/12/2012), PTSD (post-traumatic stress disorder), S/P placement of cardiac pacemaker (2/3/2019), Seizures ([address] Spinal stenosis (8/8/2014), Stroke ([address] Thrombocytopenia, unspecified ([address] (1/12/2012), and Thyroid disease. Mr. Jess Powell  has a past surgical history that includes hx heent (2010); hx other surgical (1967/68); hx orthopaedic; bone marrow aspirate &biopsy (1/19/2012); pr cardiac surg procedure unlist; hx heart catheterization (4/23/2015); hx back surgery; and pr neurological procedure unlisted. Social History/Living Environment:  
Home Environment: Private residence One/Two Story Residence: Two story, live on 1st floor Living Alone: No 
Support Systems: Family member(s) Patient Expects to be Discharged to[de-identified] Private residence Current DME Used/Available at Home: Walker, rolling, Cane, straight, Wheelchair Prior Level of Function/Work/Activity: 
Lives at home with wife, requires some assist with transfers and ambulates with RW. Number of Personal Factors/Comorbidities that affect the Plan of Care: 1-2: MODERATE COMPLEXITY EXAMINATION:  
Most Recent Physical Functioning:  
Gross Assessment: 
AROM: Generally decreased, functional 
Strength: Generally decreased, functional 
         
  
Posture: 
Posture (WDL): Exceptions to [facility] Posture Assessment: Forward head, Rounded shoulders Balance: 
Sitting: Intact Standing: Impaired Standing - Static: Constant support Standing - Dynamic : Constant support Bed Mobility: 
Supine to Sit: Contact guard assistance;Minimum assistance; Additional time Wheelchair Mobility: 
  
Transfers: 
Sit to Stand: Minimum assistance Stand to Sit: Minimum assistance Bed to Chair: Minimum assistance Gait: 
  
Base of Support: Widened Speed/Precious: Slow Step Length: Right shortened;Left shortened Distance (ft): 100 Feet (ft) Assistive Device: Walker, rolling;Gait belt Ambulation - Level of Assistance: Contact guard assistance Body Structures Involved: 
Heart Joints Body Functions Affected: 
Sensory/Pain Cardio Movement Related Activities and Participation Affected: Mobility Self Care Domestic Life Number of elements that affect the Plan of Care: 4+: HIGH COMPLEXITY CLINICAL PRESENTATION:  
Presentation: Evolving clinical presentation with changing clinical characteristics: MODERATE COMPLEXITY CLINICAL DECISION [address] [place] [id number] AM-PAC? ?6 Clicks? Basic Mobility Inpatient Short Form How much difficulty does the patient currently have. .. Unable A Lot A Little None 1. Turning over in bed (including adjusting bedclothes, sheets and blankets)? ? 1   ? 2   ? 3   ? 4  
2. Sitting down on and standing up from a chair with arms ( e.g., wheelchair, bedside commode, etc.)   ? 1   ? 2   ? 3   ? 4  
3. Moving from lying on back to sitting on the side of the bed?   ? 1   ? 2   ? 3   ? 4 How much help from another person does the patient currently need. .. Total A Lot A Little None 4. Moving to and from a bed to a chair (including a wheelchair)? ? 1   ? 2   ? 3   ? 4  
5. Need to walk in hospital room? ? 1   ? 2   ? 3   ? 4  
6. Climbing 3-5 steps with a railing? ? 1   ? 2   ? 3   ? 4  
© 2007, Trustees of [address] [place] [address] [address] [id number], under license to [employer].  All rights reserved Score:  Initial: 18 Most Recent: X (Date: -- ) Interpretation of Tool:  Represents activities that are increasingly more difficult (i.e. Bed mobility, Transfers, Gait). Medical Necessity:    
Patient is expected to demonstrate progress in balance, functional technique and activity tolerance  
 to increase independence with   and improve safety during all functional mobility [facility] Reason for Services/Other Comments: 
Patient continues to require skilled intervention due to recent fall and decreased safety [facility] Use of outcome tool(s) and clinical judgement create a POC that gives a: Clear prediction of patient's progress: LOW COMPLEXITY  
  
 
 
 
TREATMENT:  
(In addition to Assessment/Re-Assessment sessions the following treatments were rendered) Pre-treatment Symptoms/Complaints:  none. Pain: Initial:  
Pain Intensity 1: 7 Pain Orientation 1: Left Pain Intervention(s) 1: Repositioned  Post Session:  6 Therapeutic Activity: (    15): Therapeutic activities including sitting and standing balance activities in room also sit to stand several times  to improve mobility, strength and balance. Required minimal cueing   to promote static and dynamic balance in sitting and safe technique . Therapeutic Exercise: ( 8 minutes):  Exercises per grid below to improve strength and coordination. Required minimal visual and verbal cues to promote proper body alignment. Progressed complexity of movement as indicated. DATE: 4/5/19 Ambulation Hip Flexion X10 AB Long Arc Laurell [place] Knee Squeezes Ankle DF/PF X10 AB Key:  A=active, AA=active assisted, P=passive, B=bilaterally, R=right, L=left DF=dorsiflexion, PF=plantarflexion Braces/Orthotics/Lines/Etc:  
O2 Device: Room air Treatment/Session Assessment:   
Response to Treatment:  pleasant and cooperative, but slow to respond. Interdisciplinary Collaboration:  
Physical Therapist 
Registered Nurse  After treatment position/precautions:  
Up in chair Bed alarm/tab alert on Bed/Chair-wheels locked Call light within reach RN notified Family at bedside Compliance with Program/Exercises: Will assess as treatment progresses Recommendations/Intent for next treatment session: \"Next visit will focus on advancements to more challenging activities and reduction in assistance provided\". Total Treatment Duration: PT Patient Time In/Time Out Time In: 4089 Time Out: 1220 Lucia Banegas, PT, DPT